# Patient Record
Sex: FEMALE | Race: BLACK OR AFRICAN AMERICAN | NOT HISPANIC OR LATINO | Employment: FULL TIME | URBAN - METROPOLITAN AREA
[De-identification: names, ages, dates, MRNs, and addresses within clinical notes are randomized per-mention and may not be internally consistent; named-entity substitution may affect disease eponyms.]

---

## 2017-01-09 ENCOUNTER — OFFICE VISIT (OUTPATIENT)
Dept: PEDIATRICS | Facility: CLINIC | Age: 42
End: 2017-01-09
Payer: COMMERCIAL

## 2017-01-09 VITALS
DIASTOLIC BLOOD PRESSURE: 62 MMHG | WEIGHT: 168.1 LBS | SYSTOLIC BLOOD PRESSURE: 98 MMHG | BODY MASS INDEX: 28.84 KG/M2 | OXYGEN SATURATION: 99 % | TEMPERATURE: 98.5 F | HEART RATE: 73 BPM

## 2017-01-09 DIAGNOSIS — G44.219 EPISODIC TENSION-TYPE HEADACHE, NOT INTRACTABLE: Primary | ICD-10-CM

## 2017-01-09 PROCEDURE — 99214 OFFICE O/P EST MOD 30 MIN: CPT | Performed by: INTERNAL MEDICINE

## 2017-01-09 RX ORDER — GABAPENTIN 100 MG/1
100 CAPSULE ORAL AT BEDTIME
Qty: 30 CAPSULE | Refills: 0 | Status: SHIPPED | OUTPATIENT
Start: 2017-01-09 | End: 2017-02-15

## 2017-01-09 NOTE — MR AVS SNAPSHOT
"              After Visit Summary   1/9/2017    Felicia Bermudez    MRN: [id number]           Patient Information     Date Of Birth          1975        Visit Information        Provider Department      1/9/2017 7:40 AM Jose Daniel Arredondo MD [facility]        Today's Diagnoses     Episodic tension-type headache, not intractable    -  1       Care Instructions    Stop the 300 mg gabapentin and begin 100 mg gabapentin.    Begin zyrtec daily 10 mg, and stop all flonase.    Call in 3 weeks with update; if grogginess and constipation not improving, we'll consider stopping gabapentin and beginning topamax.    Jose Daniel Arredondo MD  Internal Medicine and Pediatrics           Follow-ups after your visit        Your next 10 appointments already scheduled     Jan 13, 2017  8:20 AM   PHYSICAL with Reina Mason MD   [facility] ([facility])    [address] [place] [address]  [address]  [facility] [id number] [phone]              Who to contact     If you have questions or need follow up information about today's clinic visit or your schedule please contact [facility] directly at [phone].  Normal or non-critical lab and imaging results will be communicated to you by [employer], letter or phone within 4 business days after the clinic has received the results. If you do not hear from us within 7 days, please contact the clinic through [employer] or phone. If you have a critical or abnormal lab result, we will notify you by phone as soon as possible.  Submit refill requests through [employer] or call your pharmacy and they will forward the refill request to us. Please allow 3 business days for your refill to be completed.          Additional Information About Your Visit        [employer] Information     [employer] lets you send messages to your doctor, view your test results, renew your prescriptions, schedule appointments and more. To sign up, go to [email or web address] . Click on \"Log " "in\" on the left side of the screen, which will take you to the Welcome page. Then click on \"Sign up Now\" on the right side of the page.     You will be asked to enter the access code listed below, as well as some personal information. Please follow the directions to create your username and password.     Your access code is: [email or web address]  Expires: 3/1/2017 11:13 AM     Your access code will  in 90 days. If you need help or a new code, please call your [facility] clinic or [phone].        Care EveryWhere ID     This is your Care EveryWhere ID. This could be used by other organizations to access your [facility] medical records  [id number]        Your Vitals Were     Pulse Temperature Pulse Oximetry             73 98.5  F (36.9  C) (Oral) 99%          Blood Pressure from Last 3 Encounters:   17 98/62   16 98/60   16 102/66    Weight from Last 3 Encounters:   17 168 lb 1.6 oz (76.25 kg)   16 167 lb 2 oz (75.807 kg)   16 165 lb 4 oz (74.957 kg)              Today, you had the following     No orders found for display         Today's Medication Changes          These changes are accurate as of: 17  8:11 AM.  If you have any questions, ask your nurse or doctor.               These medicines have changed or have updated prescriptions.        Dose/Directions    gabapentin 100 MG capsule   Commonly known as:  NEURONTIN   This may have changed:    - medication strength  - how much to take   Used for:  Episodic tension-type headache, not intractable   Changed by:  Jose Daniel Arredondo MD        Dose:  100 mg   Take 1 capsule (100 mg) by mouth At Bedtime   Quantity:  30 capsule   Refills:  0         Stop taking these medicines if you haven't already. Please contact your care team if you have questions.     fluticasone 50 MCG/ACT spray   Commonly known as:  FLONASE   Stopped by:  Jose Daniel Arredondo MD                Where to get your medicines      These medications were sent to [facility] - [address]   [address], [place] [address]     Phone:  [phone]    - gabapentin 100 MG capsule             Primary Care Provider Office Phone # Fax #    Jose Daniel Arredondo [phone]       [facility] [address] [place] [address]        Thank you!     Thank you for choosing [facility]  for your care. Our goal is always to provide you with excellent care. Hearing back from our patients is one way we can continue to improve our services. Please take a few minutes to complete the written survey that you may receive in the mail after your visit with us. Thank you!             Your Updated Medication List - Protect others around you: Learn how to safely use, store and throw away your medicines at www.disposemymeds.org.          This list is accurate as of: 1/9/17  8:11 AM.  Always use your most recent med list.                   Brand Name Dispense Instructions for use    albuterol 108 (90 BASE) MCG/ACT Inhaler    PROAIR HFA/PROVENTIL HFA/VENTOLIN HFA    1 Inhaler    Inhale 2 puffs into the lungs every 6 hours as needed for shortness of breath / dyspnea or wheezing       BENADRYL PO          etonogestrel 68 MG Impl    IMPLANON/NEXPLANON    1 each    1 each (68 mg) by Subdermal route once for 1 dose       gabapentin 100 MG capsule    NEURONTIN    30 capsule    Take 1 capsule (100 mg) by mouth At Bedtime       hydrOXYzine 25 MG tablet    ATARAX    60 tablet    Take 1-2 tablets (25-50 mg) by mouth every 6 hours as needed for itching       IBUPROFEN PO          MULTIVITAMIN PO          pantoprazole 20 MG EC tablet    PROTONIX    30 tablet    Take by mouth 30-60 minutes before a meal.

## 2017-01-09 NOTE — PROGRESS NOTES
SUBJECTIVE:                                                    Felicia Bermudez is a 41 year old female who presents to clinic today for the following health issues:    Pt is here for a follow up on Headaches.  - states that they are still the same, not hurting as much as before, but still getting them  - getting them every other day  - still sometimes dizzy  - no more weakness or numbness   - still having the blind spots once in a while   - the Gabapentin has been helping with the pain, but having troubles with memory and making her tired, she did finish the rx   - thinks the headaches are from her neck and shoulder pains on both sides       Used to have headache about every day, but now (on gabapentin) only every 3 days, and are less intense.  Has headaches in the AM, and last until 4:00 PM.  Takes ibuprofen without much relief.      Feels tired as a side effect of gabapentin; sleeping better, but the next day feels groggy.    Also witih constipation, about once weekly.     Also, she has additional complaints of shoulder pain and neck pains; seeing chiropracter and massage; both shoulders.    Feels like is congested all the time; may be contributing; zyrtec has helped, but flonase has not.    Problem list and histories reviewed & adjusted, as indicated.  Additional history: as documented    Patient Active Problem List   Diagnosis     Insomnia     Urticaria     Multiple food allergies     PUD (peptic ulcer disease)     Abnormal Pap smear of cervix     High risk HPV infection     Past Surgical History   Procedure Laterality Date     Surgical history of -        2010 turbinate surgery       Social History   Substance Use Topics     Smoking status: Never Smoker      Smokeless tobacco: Never Used     Alcohol Use: No     Family History   Problem Relation Age of Onset     Neurologic Disorder Mother 57     brain anuerysm     Hypertension Father      Other Cancer Father 70     Testicle Cancer         Current Outpatient  Prescriptions   Medication Sig Dispense Refill     gabapentin (NEURONTIN) 100 MG capsule Take 1 capsule (100 mg) by mouth At Bedtime 30 capsule 0     hydrOXYzine (ATARAX) 25 MG tablet Take 1-2 tablets (25-50 mg) by mouth every 6 hours as needed for itching 60 tablet 1     pantoprazole (PROTONIX) 20 MG tablet Take by mouth 30-60 minutes before a meal. 30 tablet 0     etonogestrel (IMPLANON/NEXPLANON) 68 MG IMPL 1 each (68 mg) by Subdermal route once for 1 dose 1 each 0     Multiple Vitamins-Minerals (MULTIVITAMIN OR)        albuterol (PROAIR HFA, PROVENTIL HFA, VENTOLIN HFA) 108 (90 BASE) MCG/ACT inhaler Inhale 2 puffs into the lungs every 6 hours as needed for shortness of breath / dyspnea or wheezing 1 Inhaler 0     IBUPROFEN PO        DiphenhydrAMINE HCl (BENADRYL PO)        [DISCONTINUED] gabapentin (NEURONTIN) 300 MG capsule Take 1 capsule (300 mg) by mouth At Bedtime 30 capsule 1     Allergies   Allergen Reactions     Ambien      Tongue swelling     Amoxicillin      Tongue swelling     Augmentin      Tongue swelling     Avocado      Bactrim [Sulfamethoxazole W/Trimethoprim]      Tongue swelling     Cucumber Extract      Eszopiclone Swelling     Tongue swelling     Watermelon [Citrullus Vulgaris]      BP Readings from Last 3 Encounters:   01/09/17 98/62   12/01/16 98/60   08/26/16 102/66    Wt Readings from Last 3 Encounters:   01/09/17 168 lb 1.6 oz (76.25 kg)   12/01/16 167 lb 2 oz (75.807 kg)   08/26/16 165 lb 4 oz (74.957 kg)                  Labs reviewed in [facility]  Problem list, Medication list, Allergies, and Medical/Social/Surgical histories reviewed in [facility] and updated as appropriate.    ROS:  C: NEGATIVE for fever, chills, change in weight  E/M: NEGATIVE for ear, mouth and throat problems  R: NEGATIVE for significant cough or SOB  CV: NEGATIVE for chest pain, palpitations or peripheral edema    OBJECTIVE:                                                    BP 98/62 mmHg  Pulse 73  Temp(Src) 98.5  F  (36.9  C) (Oral)  Wt 168 lb 1.6 oz (76.25 kg)  SpO2 99%  Body mass index is 28.84 kg/(m^2).   GENERAL: healthy, alert, well nourished, well hydrated, no distress  HENT: ear canals- normal; TMs- normal; Nose- normal; Mouth- no ulcers, no lesions  NECK: no tenderness, no adenopathy, no asymmetry, no masses, no stiffness; thyroid- normal to palpation  RESP: lungs clear to auscultation - no rales, no rhonchi, no wheezes  CV: regular rates and rhythm, normal S1 S2, no S3 or S4 and no murmur, no click or rub -  ABDOMEN: soft, no tenderness, no  hepatosplenomegaly, no masses, normal bowel sounds    Diagnostic test results:  Diagnostic Test Results:  none      ASSESSMENT/PLAN:                                                    1. Episodic tension-type headache, not intractable  Overall, symptoms improved but now with some side effects of gabapentin.  Refilled a lower dose.  Consider topamax if not improved.  If THIS does not help, then would have evaluation by neurology.   Patient Instructions   Stop the 300 mg gabapentin and begin 100 mg gabapentin.    Begin zyrtec daily 10 mg, and stop all flonase.    Call in 3 weeks with update; if grogginess and constipation not improving, we'll consider stopping gabapentin and beginning topamax.    Jose Daniel Arredondo MD  Internal Medicine and Pediatrics          - gabapentin (NEURONTIN) 100 MG capsule; Take 1 capsule (100 mg) by mouth At Bedtime  Dispense: 30 capsule; Refill: 0      See Patient Instructions    Jose Daniel Arredondo MD  [facility]

## 2017-01-09 NOTE — NURSING NOTE
"Chief Complaint   Patient presents with     RECHECK       Initial BP 98/62 mmHg  Pulse 73  Temp(Src) 98.5  F (36.9  C) (Oral)  Wt 168 lb 1.6 oz (76.25 kg)  SpO2 99% Estimated body mass index is 28.84 kg/(m^2) as calculated from the following:    Height as of 8/26/16: 5' 4\" (1.626 m).    Weight as of this encounter: 168 lb 1.6 oz (76.25 kg).  BP completed using cuff size: clarence Kebede MA    "

## 2017-01-09 NOTE — PATIENT INSTRUCTIONS
Stop the 300 mg gabapentin and begin 100 mg gabapentin.    Begin zyrtec daily 10 mg, and stop all flonase.    Call in 3 weeks with update; if grogginess and constipation not improving, we'll consider stopping gabapentin and beginning topamax.    Jose Daniel Arredondo MD  Internal Medicine and Pediatrics

## 2017-01-13 ENCOUNTER — OFFICE VISIT (OUTPATIENT)
Dept: PEDIATRICS | Facility: CLINIC | Age: 42
End: 2017-01-13
Payer: COMMERCIAL

## 2017-01-13 VITALS
DIASTOLIC BLOOD PRESSURE: 66 MMHG | HEIGHT: 63 IN | SYSTOLIC BLOOD PRESSURE: 100 MMHG | HEART RATE: 85 BPM | TEMPERATURE: 98.6 F | WEIGHT: 167.3 LBS | OXYGEN SATURATION: 100 % | BODY MASS INDEX: 29.64 KG/M2

## 2017-01-13 DIAGNOSIS — N63.10 LUMP OF RIGHT BREAST: ICD-10-CM

## 2017-01-13 DIAGNOSIS — B97.7 HIGH RISK HPV INFECTION: ICD-10-CM

## 2017-01-13 DIAGNOSIS — Z01.411 ENCOUNTER FOR GYNECOLOGICAL EXAMINATION WITH ABNORMAL FINDING: Primary | ICD-10-CM

## 2017-01-13 DIAGNOSIS — O92.70 LACTATION PROBLEM: ICD-10-CM

## 2017-01-13 DIAGNOSIS — R63.5 ABNORMAL WEIGHT GAIN: ICD-10-CM

## 2017-01-13 DIAGNOSIS — R10.2 PELVIC PAIN IN FEMALE: ICD-10-CM

## 2017-01-13 LAB
BETA HCG QUAL IFA URINE: NEGATIVE
CORTIS SERPL-MCNC: 7.4 UG/DL (ref 4–22)
PROLACTIN SERPL-MCNC: 8 UG/L (ref 3–27)
TSH SERPL DL<=0.005 MIU/L-ACNC: 1.95 MU/L (ref 0.4–4)

## 2017-01-13 PROCEDURE — 99213 OFFICE O/P EST LOW 20 MIN: CPT | Mod: 25 | Performed by: PEDIATRICS

## 2017-01-13 PROCEDURE — 84146 ASSAY OF PROLACTIN: CPT | Performed by: PEDIATRICS

## 2017-01-13 PROCEDURE — 87624 HPV HI-RISK TYP POOLED RSLT: CPT | Performed by: PEDIATRICS

## 2017-01-13 PROCEDURE — 88175 CYTOPATH C/V AUTO FLUID REDO: CPT | Performed by: PEDIATRICS

## 2017-01-13 PROCEDURE — 99396 PREV VISIT EST AGE 40-64: CPT | Performed by: PEDIATRICS

## 2017-01-13 PROCEDURE — 36415 COLL VENOUS BLD VENIPUNCTURE: CPT | Performed by: PEDIATRICS

## 2017-01-13 PROCEDURE — 84703 CHORIONIC GONADOTROPIN ASSAY: CPT | Performed by: PEDIATRICS

## 2017-01-13 PROCEDURE — 84443 ASSAY THYROID STIM HORMONE: CPT | Performed by: PEDIATRICS

## 2017-01-13 PROCEDURE — 82533 TOTAL CORTISOL: CPT | Performed by: PEDIATRICS

## 2017-01-13 NOTE — MR AVS SNAPSHOT
After Visit Summary   1/13/2017    Felicia Bermudez    MRN: [id number]           Patient Information     Date Of Birth          1975        Visit Information        Provider Department      1/13/2017 8:20 AM Reina Mason MD [facility]        Today's Diagnoses     Encounter for gynecological examination with abnormal finding    -  1     Lump of right breast         Lactation problem         High risk HPV infection         Pelvic pain in female         Abnormal weight gain           Care Instructions    1. Pelvic ultrasound for right pelvic pain - you will be called to schedule this.  2. Mammogram and ultrasound - Please call [facility] to schedule mammogram: [phone]  3. Labs today  4. Endocrine referral for lactation and weight gain - ok to schedule while we are waiting for labs - referral below.      Preventive Health Recommendations  Female Ages 40 to 49    Yearly exam:     See your health care provider every year in order to  1. Review health changes.   2. Discuss preventive care.    3. Review your medicines if your doctor prescribed any.      Get a Pap test every three years (unless you have an abnormal result and your provider advises testing more often).      If you get Pap tests with HPV test, you only need to test every 5 years, unless you have an abnormal result. You do not need a Pap test if your uterus was removed (hysterectomy) and you have not had cancer.      You should be tested each year for STDs (sexually transmitted diseases), if you're at risk.       Ask your doctor if you should have a mammogram.      Have a colonoscopy (test for colon cancer) if someone in your family has had colon cancer or polyps before age 50.       Have a cholesterol test every 5 years.       Have a diabetes test (fasting glucose) after age 45. If you are at risk for diabetes, you should have this test every 3 years.    Shots: Get a flu shot each year. Get a tetanus shot every 10  years.     Nutrition:     Eat at least 5 servings of fruits and vegetables each day.    Eat whole-grain bread, whole-wheat pasta and brown rice instead of white grains and rice.    Talk to your provider about Calcium and Vitamin D.     Lifestyle    Exercise at least 150 minutes a week (an average of 30 minutes a day, 5 days a week). This will help you control your weight and prevent disease.    Limit alcohol to one drink per day.    No smoking.     Wear sunscreen to prevent skin cancer.    See your dentist every six months for an exam and cleaning.        Follow-ups after your visit        Additional Services     ENDOCRINOLOGY ADULT REFERRAL       Your provider has referred you to: [facility]: [place] [phone]   [email or web address]      Please be aware that coverage of these services is subject to the terms and limitations of your health insurance plan.  Call member services at your health plan with any benefit or coverage questions.      Please bring the following to your appointment:    >>   Any x-rays, CTs or MRIs which have been performed.  Contact the facility where they were done to arrange for  prior to your scheduled appointment.    >>   List of current medications   >>   This referral request   >>   Any documents/labs given to you for this referral                  Future tests that were ordered for you today     Open Future Orders        Priority Expected Expires Ordered    MA Diagnostic Digital Bilateral Routine  1/13/2018 1/13/2017    US Breast Bilateral Complete 4 Quadrants Routine  1/13/2018 1/13/2017    US Pelvic Complete with Transvaginal Routine  1/13/2018 1/13/2017            Who to contact     If you have questions or need follow up information about today's clinic visit or your schedule please contact [facility] directly at [phone].  Normal or non-critical lab and imaging results will be communicated to you by MyChart, letter or  "phone within 4 business days after the clinic has received the results. If you do not hear from us within 7 days, please contact the clinic through [employer] or phone. If you have a critical or abnormal lab result, we will notify you by phone as soon as possible.  Submit refill requests through [employer] or call your pharmacy and they will forward the refill request to us. Please allow 3 business days for your refill to be completed.          Additional Information About Your Visit        [employer] Information     [employer] lets you send messages to your doctor, view your test results, renew your prescriptions, schedule appointments and more. To sign up, go to [email or web address] . Click on \"Log in\" on the left side of the screen, which will take you to the Welcome page. Then click on \"Sign up Now\" on the right side of the page.     You will be asked to enter the access code listed below, as well as some personal information. Please follow the directions to create your username and password.     Your access code is: [email or web address]  Expires: 3/1/2017 11:13 AM     Your access code will  in 90 days. If you need help or a new code, please call your [facility] clinic or [phone].        Care EveryWhere ID     This is your Care EveryWhere ID. This could be used by other organizations to access your [facility] medical records  [id number]        Your Vitals Were     Pulse Temperature Height BMI (Body Mass Index) Pulse Oximetry       85 98.6  F (37  C) (Tympanic) 5' 2.5\" (1.588 m) 30.09 kg/m2 100%        Blood Pressure from Last 3 Encounters:   17 100/66   17 98/62   16 98/60    Weight from Last 3 Encounters:   17 167 lb 4.8 oz (75.887 kg)   17 168 lb 1.6 oz (76.25 kg)   16 167 lb 2 oz (75.807 kg)              We Performed the Following     Beta HCG qual IFA urine     Cortisol     ENDOCRINOLOGY ADULT REFERRAL     HPV High Risk Types DNA Cervical     Pap imaged thin layer diagnostic with " HPV (select HPV order below)     Prolactin     TSH with free T4 reflex        Primary Care Provider Office Phone # Fax #    Jose Daniel Arredondo [phone]       [facility] [address] [place] [address]        Thank you!     Thank you for choosing [facility]  for your care. Our goal is always to provide you with excellent care. Hearing back from our patients is one way we can continue to improve our services. Please take a few minutes to complete the written survey that you may receive in the mail after your visit with us. Thank you!             Your Updated Medication List - Protect others around you: Learn how to safely use, store and throw away your medicines at www.disposemymeds.org.          This list is accurate as of: 1/13/17  8:58 AM.  Always use your most recent med list.                   Brand Name Dispense Instructions for use    albuterol 108 (90 BASE) MCG/ACT Inhaler    PROAIR HFA/PROVENTIL HFA/VENTOLIN HFA    1 Inhaler    Inhale 2 puffs into the lungs every 6 hours as needed for shortness of breath / dyspnea or wheezing       BENADRYL PO          etonogestrel 68 MG Impl    IMPLANON/NEXPLANON    1 each    1 each (68 mg) by Subdermal route once for 1 dose       gabapentin 100 MG capsule    NEURONTIN    30 capsule    Take 1 capsule (100 mg) by mouth At Bedtime       hydrOXYzine 25 MG tablet    ATARAX    60 tablet    Take 1-2 tablets (25-50 mg) by mouth every 6 hours as needed for itching       IBUPROFEN PO          MULTIVITAMIN PO          pantoprazole 20 MG EC tablet    PROTONIX    30 tablet    Take by mouth 30-60 minutes before a meal.

## 2017-01-13 NOTE — Clinical Note
[facility]  [address]  [address]  [place] [address] [phone]      January 23, 2017    Felicia Bermudez  [address]   [place] [address]    Dear Felicia,  We are happy to inform you that your PAP smear result from 01/13/17 is normal.  We are now able to do a follow up test on PAP smears. The DNA test is for HPV (Human Papilloma Virus). Cervical cancer is closely linked with certain types of HPV. Your result showed no evidence of high risk HPV.  Therefore we recommend you return in 3 years for your next pap smear.  You will still need to return to the clinic every year for an annual exam and other preventive tests.  Please contact the clinic with any questions.  Sincerely,  Reina Mason MD/[facility]

## 2017-01-13 NOTE — PATIENT INSTRUCTIONS
1. Pelvic ultrasound for right pelvic pain - you will be called to schedule this.  2. Mammogram and ultrasound - [facility] call [facility] to schedule mammogram: [phone]  3. Labs today  4. Endocrine referral for lactation and weight gain - ok to schedule while we are waiting for labs - referral below.      Preventive Health Recommendations  Female Ages 40 to 49    Yearly exam:     See your health care provider every year in order to  1. Review health changes.   2. Discuss preventive care.    3. Review your medicines if your doctor prescribed any.      Get a Pap test every three years (unless you have an abnormal result and your provider advises testing more often).      If you get Pap tests with HPV test, you only need to test every 5 years, unless you have an abnormal result. You do not need a Pap test if your uterus was removed (hysterectomy) and you have not had cancer.      You should be tested each year for STDs (sexually transmitted diseases), if you're at risk.       Ask your doctor if you should have a mammogram.      Have a colonoscopy (test for colon cancer) if someone in your family has had colon cancer or polyps before age 50.       Have a cholesterol test every 5 years.       Have a diabetes test (fasting glucose) after age 45. If you are at risk for diabetes, you should have this test every 3 years.    Shots: Get a flu shot each year. Get a tetanus shot every 10 years.     Nutrition:     Eat at least 5 servings of fruits and vegetables each day.    Eat whole-grain bread, whole-wheat pasta and brown rice instead of white grains and rice.    Talk to your provider about Calcium and Vitamin D.     Lifestyle    Exercise at least 150 minutes a week (an average of 30 minutes a day, 5 days a week). This will help you control your weight and prevent disease.    Limit alcohol to one drink per day.    No smoking.     Wear sunscreen to prevent skin cancer.    See your dentist every six months for an exam and  cleaning.

## 2017-01-13 NOTE — NURSING NOTE
"Chief Complaint   Patient presents with     Physical       Initial /66 mmHg  Pulse 85  Temp(Src) 98.6  F (37  C) (Tympanic)  Ht 5' 2.5\" (1.588 m)  Wt 167 lb 4.8 oz (75.887 kg)  BMI 30.09 kg/m2  SpO2 100% Estimated body mass index is 30.09 kg/(m^2) as calculated from the following:    Height as of this encounter: 5' 2.5\" (1.588 m).    Weight as of this encounter: 167 lb 4.8 oz (75.887 kg).  BP completed using cuff size: regular    "

## 2017-01-13 NOTE — PROGRESS NOTES
SUBJECTIVE:     CC: Felicia Bermudez is an 41 year old woman who presents for preventive health visit.     Healthy Habits:    Do you get at least three servings of calcium containing foods daily (dairy, green leafy vegetables, etc.)? yes    Amount of exercise or daily activities, outside of work: 3 day(s) per week    Problems taking medications regularly No    Medication side effects: Yes gabapentin but dose was lowered     Have you had an eye exam in the past two years? yes    Do you see a dentist twice per year? yes  Do you have sleep apnea, excessive snoring or daytime drowsiness?no    PROBLEMS TO ADD ON...  - breast pain - patient continues to have constant breast pressure and daily lactation (last breast fed 18 years ago).  This was evaluated a year ago - labs normal.  For some reason patient never got to endocrine.  Symptoms continues - now with abnormal weight gain.  She tried a weight loss program, ultimate weight solution and worked with a doctor, but lost minimal weight - she states they were very frustrated with her because results were so poor.  They suggested she see an endocrinologist.    LMP 2 years ago (no periods or spotting with implanon)/.     - right side pelvic pain x 3 months, off and on.  Declines STD screen.  +dysparunia   Today's PHQ-2 Score:   PHQ-2 ( 1999 Pf[employer]) 1/13/2017 8/26/2016   Q1: Little interest or pleasure in doing things 0 0   Q2: Feeling down, depressed or hopeless 0 0   PHQ-2 Score 0 0       Abuse: Current or Past(Physical, Sexual or Emotional)- No  Do you feel safe in your environment - Yes    Social History   Substance Use Topics     Smoking status: Never Smoker      Smokeless tobacco: Never Used     Alcohol Use: No     The patient does not drink >3 drinks per day nor >7 drinks per week.    No results for input(s): CHOL, HDL, LDL, TRIG, CHOLHDLRATIO, NHDL in the last 04643 hours.    Reviewed orders with patient.  Reviewed health maintenance and updated orders accordingly -  "Yes    Mammo Decision Support:  Patient under age 50, mutual decision reflected in health maintenance.      Pertinent mammograms are reviewed under the imaging tab.  History of abnormal Pap smear: YES - updated in Problem List and Health Maintenance accordingly  All Histories reviewed and updated in Epic.    ROS:  C: NEGATIVE for fever, chills, change in weight  I: NEGATIVE for worrisome rashes, moles or lesions  E: NEGATIVE for vision changes or irritation  ENT: NEGATIVE for ear, mouth and throat problems  R: NEGATIVE for significant cough or SOB  B:hpi  CV: NEGATIVE for chest pain, palpitations or peripheral edema  GI: NEGATIVE for nausea, abdominal pain, heartburn, or change in bowel habits  : hpi  M: NEGATIVE for significant arthralgias or myalgia  N: NEGATIVE for weakness, dizziness or paresthesias  P: NEGATIVE for changes in mood or affect    Problem list, Medication list, Allergies, and Medical/Social/Surgical histories reviewed in [facility] and updated as appropriate.  OBJECTIVE:     /66 mmHg  Pulse 85  Temp(Src) 98.6  F (37  C) (Tympanic)  Ht 5' 2.5\" (1.588 m)  Wt 167 lb 4.8 oz (75.887 kg)  BMI 30.09 kg/m2  SpO2 100%  EXAM:  GENERAL: healthy, alert and no distress  EYES: Eyes grossly normal to inspection, PERRL and conjunctivae and sclerae normal  HENT: ear canals and TM's normal, nose and mouth without ulcers or lesions  NECK: no adenopathy, no asymmetry, masses, or scars and thyroid normal to palpation  RESP: lungs clear to auscultation - no rales, rhonchi or wheezes  BREAST: left normal without masses, right breast with grape sized lump at 6 oclock, bilateral tenderness--no nipple discharge on exam  CV: regular rate and rhythm, normal S1 S2, no S3 or S4, no murmur, click or rub, no peripheral edema and peripheral pulses strong  ABDOMEN: soft, nontender, no hepatosplenomegaly, no masses and bowel sounds normal   (female): normal female external genitalia, normal urethral meatus, vaginal mucosa " "pink, moist, well rugated, and normal cervix, +right cul du sac tenderness  MS: no gross musculoskeletal defects noted, no edema  SKIN: no suspicious lesions or rashes  NEURO: Normal strength and tone, mentation intact and speech normal  PSYCH: mentation appears normal, affect normal/bright    ASSESSMENT/PLAN:     1. Encounter for gynecological examination with abnormal finding  - Pap imaged thin layer diagnostic with HPV (select HPV order below)  - HPV High Risk Types DNA Cervical    2. Lump of right breast  - MA Diagnostic Digital Bilateral; Future  - US Breast Bilateral Complete 4 Quadrants; Future    3. Lactation problem  Needs to see endocrine given continued lactation and unusual weight gain.  - TSH with free T4 reflex  - Prolactin  - Cortisol  - MA Diagnostic Digital Bilateral; Future  - US Breast Bilateral Complete 4 Quadrants; Future  - Beta HCG qual IFA urine  - ENDOCRINOLOGY ADULT REFERRAL  - OFFICE/OUTPT VISIT,EST,LEVL III    4. High risk HPV infection  - Pap imaged thin layer diagnostic with HPV (select HPV order below)  - HPV High Risk Types DNA Cervical    5. Pelvic pain in female  Exam concerning with tenderness - need to visualize ovaries  - US Pelvic Complete with Transvaginal; Future  - OFFICE/OUTPT VISIT,EST,LEVL III    6. Abnormal weight gain  - ENDOCRINOLOGY ADULT REFERRAL  - OFFICE/OUTPT VISIT,EST,LEVL III    COUNSELING:   Reviewed preventive health counseling, as reflected in patient instructions       Regular exercise       Healthy diet/nutrition       reports that she has never smoked. She has never used smokeless tobacco.    Estimated body mass index is 30.09 kg/(m^2) as calculated from the following:    Height as of this encounter: 5' 2.5\" (1.588 m).    Weight as of this encounter: 167 lb 4.8 oz (75.887 kg).   Weight management plan: Discussed healthy diet and exercise guidelines and patient will follow up in 12 months in clinic to re-evaluate.    Counseling Resources:  ATP IV " Guidelines  Pooled Cohorts Equation Calculator  Breast Cancer Risk Calculator  FRAX Risk Assessment  ICSI Preventive Guidelines  Dietary Guidelines for Americans, 2010  [place]'s MyPlate  ASA Prophylaxis  Lung CA Screening    Reina Mason MD  [facility]

## 2017-01-17 LAB
COPATH REPORT: NORMAL
PAP: NORMAL

## 2017-01-18 ENCOUNTER — HOSPITAL ENCOUNTER (OUTPATIENT)
Dept: ULTRASOUND IMAGING | Facility: CLINIC | Age: 42
End: 2017-01-18
Attending: PEDIATRICS
Payer: COMMERCIAL

## 2017-01-18 ENCOUNTER — HOSPITAL ENCOUNTER (OUTPATIENT)
Dept: MAMMOGRAPHY | Facility: CLINIC | Age: 42
End: 2017-01-18
Attending: PEDIATRICS
Payer: COMMERCIAL

## 2017-01-18 ENCOUNTER — HOSPITAL ENCOUNTER (OUTPATIENT)
Dept: ULTRASOUND IMAGING | Facility: CLINIC | Age: 42
Discharge: HOME | End: 2017-01-18
Attending: PEDIATRICS | Admitting: PEDIATRICS
Payer: COMMERCIAL

## 2017-01-18 DIAGNOSIS — O92.70 LACTATION PROBLEM: ICD-10-CM

## 2017-01-18 DIAGNOSIS — N63.10 LUMP OF RIGHT BREAST: ICD-10-CM

## 2017-01-18 DIAGNOSIS — R10.2 PELVIC PAIN IN FEMALE: ICD-10-CM

## 2017-01-18 PROCEDURE — 76642 ULTRASOUND BREAST LIMITED: CPT | Mod: 50

## 2017-01-18 PROCEDURE — G0204 DX MAMMO INCL CAD BI: HCPCS

## 2017-01-18 PROCEDURE — 76830 TRANSVAGINAL US NON-OB: CPT

## 2017-01-20 LAB
FINAL DIAGNOSIS: NORMAL
HPV HR 12 DNA CVX QL NAA+PROBE: NEGATIVE
HPV16 DNA SPEC QL NAA+PROBE: NEGATIVE
HPV18 DNA SPEC QL NAA+PROBE: NEGATIVE
SPECIMEN DESCRIPTION: NORMAL

## 2017-02-13 ENCOUNTER — OFFICE VISIT (OUTPATIENT)
Dept: URGENT CARE | Facility: URGENT CARE | Age: 42
End: 2017-02-13
Payer: COMMERCIAL

## 2017-02-13 VITALS
SYSTOLIC BLOOD PRESSURE: 102 MMHG | TEMPERATURE: 98.8 F | OXYGEN SATURATION: 99 % | HEART RATE: 73 BPM | WEIGHT: 171.4 LBS | BODY MASS INDEX: 30.85 KG/M2 | DIASTOLIC BLOOD PRESSURE: 64 MMHG

## 2017-02-13 DIAGNOSIS — J01.90 ACUTE SINUSITIS WITH SYMPTOMS > 10 DAYS: Primary | ICD-10-CM

## 2017-02-13 PROCEDURE — 99213 OFFICE O/P EST LOW 20 MIN: CPT | Performed by: PHYSICIAN ASSISTANT

## 2017-02-13 RX ORDER — AZITHROMYCIN 250 MG/1
TABLET, FILM COATED ORAL
Qty: 6 TABLET | Refills: 0 | Status: SHIPPED | OUTPATIENT
Start: 2017-02-13 | End: 2017-02-28

## 2017-02-13 NOTE — NURSING NOTE
"Chief Complaint   Patient presents with     Urgent Care     Nasal Congestion     Pt states has had congestion, headaches, and sinus pressure in face x 3 weeks. States has used nasal spray, Mucinex, and Sudafed for symptoms.        Initial /64 (BP Location: Right arm, Patient Position: Chair, Cuff Size: Adult Large)  Pulse 73  Temp 98.8  F (37.1  C) (Tympanic)  Wt 171 lb 6.4 oz (77.7 kg)  SpO2 99%  BMI 30.85 kg/m2 Estimated body mass index is 30.85 kg/(m^2) as calculated from the following:    Height as of 1/13/17: 5' 2.5\" (1.588 m).    Weight as of this encounter: 171 lb 6.4 oz (77.7 kg).  Medication Reconciliation: unable or not appropriate to perform   Padmini Mercer CMA (AAMA) 2/13/2017 3:23 PM    "

## 2017-02-13 NOTE — MR AVS SNAPSHOT
"              After Visit Summary   2/13/2017    Felicia Bermudez    MRN: [id number]           Patient Information     Date Of Birth          1975        Visit Information        Provider Department      2/13/2017 2:45 PM Brittany Ramos PA-C [facility]        Today's Diagnoses     Acute sinusitis with symptoms > 10 days    -  1       Follow-ups after your visit        Future tests that were ordered for you today     Open Future Orders        Priority Expected Expires Ordered    Cortisol free urine Routine  3/1/2018 2/28/2017            Who to contact     If you have questions or need follow up information about today's clinic visit or your schedule please contact [facility] directly at [phone].  Normal or non-critical lab and imaging results will be communicated to you by [employer], letter or phone within 4 business days after the clinic has received the results. If you do not hear from us within 7 days, please contact the clinic through [employer] or phone. If you have a critical or abnormal lab result, we will notify you by phone as soon as possible.  Submit refill requests through [employer] or call your pharmacy and they will forward the refill request to us. Please allow 3 business days for your refill to be completed.          Additional Information About Your Visit        MyChart Information     [employer] lets you send messages to your doctor, view your test results, renew your prescriptions, schedule appointments and more. To sign up, go to [email or web address] . Click on \"Log in\" on the left side of the screen, which will take you to the Welcome page. Then click on \"Sign up Now\" on the right side of the page.     You will be asked to enter the access code listed below, as well as some personal information. Please follow the directions to create your username and password.     Your access code is: [email or web address]  Expires: 3/1/2017 11:13 AM     Your access code will "  in 90 days. If you need help or a new code, please call your [place] clinic or [phone].        Care EveryWhere ID     This is your Care EveryWhere ID. This could be used by other organizations to access your [place] medical records  [id number]        Your Vitals Were     Pulse Temperature Pulse Oximetry BMI (Body Mass Index)          73 98.8  F (37.1  C) (Tympanic) 99% 30.85 kg/m2         Blood Pressure from Last 3 Encounters:   17 118/62   17 102/64   17 100/66    Weight from Last 3 Encounters:   17 171 lb (77.6 kg)   17 171 lb 6.4 oz (77.7 kg)   17 167 lb 4.8 oz (75.9 kg)              Today, you had the following     No orders found for display         Today's Medication Changes          These changes are accurate as of: 17 11:59 PM.  If you have any questions, ask your nurse or doctor.               Start taking these medicines.        Dose/Directions    azithromycin 250 MG tablet   Commonly known as:  ZITHROMAX   Used for:  Acute sinusitis with symptoms > 10 days   Started by:  Brittany Ramos PA-C        Two tablets first day, then one tablet daily for four days.   Quantity:  6 tablet   Refills:  0            Where to get your medicines      These medications were sent to [facility] [address]  [address], [place] [address]     Phone:  [phone]     azithromycin 250 MG tablet                Primary Care Provider Office Phone # Fax #    Jose Daniel Arredondo [phone]       [facility] [address] [place] [address]        Thank you!     Thank you for choosing [facility]  for your care. Our goal is always to provide you with excellent care. Hearing back from our patients is one way we can continue to improve our services. Please take a few minutes to complete the written survey that you may receive in the mail after your visit  with us. Thank you!             Your Updated Medication List - Protect others around you: Learn how to safely use, store and throw away your medicines at www.disposemymeds.org.          This list is accurate as of: 2/13/17 11:59 PM.  Always use your most recent med list.                   Brand Name Dispense Instructions for use    albuterol 108 (90 BASE) MCG/ACT Inhaler    PROAIR HFA/PROVENTIL HFA/VENTOLIN HFA    1 Inhaler    Inhale 2 puffs into the lungs every 6 hours as needed for shortness of breath / dyspnea or wheezing       azithromycin 250 MG tablet    ZITHROMAX    6 tablet    Two tablets first day, then one tablet daily for four days.       BENADRYL PO          etonogestrel 68 MG Impl    IMPLANON/NEXPLANON    1 each    1 each (68 mg) by Subdermal route once for 1 dose       gabapentin 100 MG capsule    NEURONTIN    30 capsule    Take 1 capsule (100 mg) by mouth At Bedtime       hydrOXYzine 25 MG tablet    ATARAX    60 tablet    Take 1-2 tablets (25-50 mg) by mouth every 6 hours as needed for itching       IBUPROFEN PO          MULTIVITAMIN PO          pantoprazole 20 MG EC tablet    PROTONIX    30 tablet    Take by mouth 30-60 minutes before a meal.

## 2017-02-15 ENCOUNTER — TELEPHONE (OUTPATIENT)
Dept: PEDIATRICS | Facility: CLINIC | Age: 42
End: 2017-02-15

## 2017-02-15 DIAGNOSIS — G43.719 INTRACTABLE CHRONIC MIGRAINE WITHOUT AURA AND WITHOUT STATUS MIGRAINOSUS: Primary | ICD-10-CM

## 2017-02-15 RX ORDER — TOPIRAMATE 25 MG/1
TABLET, FILM COATED ORAL
Qty: 70 TABLET | Refills: 1 | Status: SHIPPED
Start: 2017-02-15 | End: 2017-07-11

## 2017-02-15 NOTE — TELEPHONE ENCOUNTER
Gabapentin not working for shoulder pain and HAs.  Pt was to report in after 3wks of trying this med.  1-9-17 OV note:  Call in 3 weeks with update; if grogginess and constipation not improving, we'll consider stopping gabapentin and beginning topamax.     Jose Daniel Arredondo MD  Internal Medicine and Pediatrics   Janet Badillo RN

## 2017-02-15 NOTE — TELEPHONE ENCOUNTER
OK.    Please tell her to stop the gabapentin 100 mg and to begin topamax.      Needs to see me within 2 months or so.    Call for any significant new side effects; dizziness, fatiuge.    Jose Daniel Arredondo MD  Internal Medicine and Pediatrics

## 2017-02-21 ENCOUNTER — TELEPHONE (OUTPATIENT)
Dept: PEDIATRICS | Facility: CLINIC | Age: 42
End: 2017-02-21

## 2017-02-21 DIAGNOSIS — G43.719 INTRACTABLE CHRONIC MIGRAINE WITHOUT AURA AND WITHOUT STATUS MIGRAINOSUS: Primary | ICD-10-CM

## 2017-02-21 NOTE — TELEPHONE ENCOUNTER
Pt. Calls and states she had picked up her prescription for Topamax for migraines, she had read about the side effects and worried about ithem so she would like to see an ENT specialist first before starting. Pt. Thinks her migraines are r/t sinus pressure.     She states she finished Z martin and feels slightly better but still feels like she has a head cold and with nasal congestion .     If you think otherwise please let her know.  ENT referral needed? Please advise.    Ingrid Jones, RN, BSN, PHN

## 2017-02-28 ENCOUNTER — OFFICE VISIT (OUTPATIENT)
Dept: ENDOCRINOLOGY | Facility: CLINIC | Age: 42
End: 2017-02-28
Payer: COMMERCIAL

## 2017-02-28 VITALS
DIASTOLIC BLOOD PRESSURE: 62 MMHG | WEIGHT: 171 LBS | BODY MASS INDEX: 30.3 KG/M2 | HEART RATE: 78 BPM | HEIGHT: 63 IN | SYSTOLIC BLOOD PRESSURE: 118 MMHG | RESPIRATION RATE: 12 BRPM

## 2017-02-28 DIAGNOSIS — R63.5 ABNORMAL WEIGHT GAIN: Primary | ICD-10-CM

## 2017-02-28 DIAGNOSIS — G47.00 INSOMNIA, UNSPECIFIED TYPE: ICD-10-CM

## 2017-02-28 PROCEDURE — 99244 OFF/OP CNSLTJ NEW/EST MOD 40: CPT | Performed by: INTERNAL MEDICINE

## 2017-02-28 NOTE — NURSING NOTE
"Chief Complaint   Patient presents with     Consult For     Sleep / weight issues       Initial /62 (BP Location: Right arm, Cuff Size: Adult Regular)  Pulse 78  Resp 12  Ht 1.588 m (5' 2.5\")  Wt 77.6 kg (171 lb)  BMI 30.78 kg/m2 Estimated body mass index is 30.78 kg/(m^2) as calculated from the following:    Height as of this encounter: 1.588 m (5' 2.5\").    Weight as of this encounter: 77.6 kg (171 lb).  Medication Reconciliation: zoya Villatoro CMA    2/28/2017  10:36 AM       "

## 2017-02-28 NOTE — PATIENT INSTRUCTIONS
[facility] & [place] locations   Dr Malcolm, Endocrinology Department      [facility]   [address] [address]  Appointment [phone]  Fax: [phone]   Monday and Tuesday         [facility]   [address] [place] [address]  Appointment [phone]  Fax: [phone]  Wednesday and Thursday           Need 24 hour urine collection.    24 Hour Urine Collection    - During a 24-hour urine collection, follow your usual diet and drink fluids as you ordinarily would.  - Avoid drinking alcohol before and during the urine collection.    - For a 24-hour urine collection, all of the urine that you pass over a 24-hour time period must be collected. You will receive a special container to collect the sample.    The following are directions for collecting a 24-hour urine sample while at home:    In the morning scheduled to begin the urine collection, urinate in the toilet and flush away the first urine you pass. Write down the date and time. That is the start date and time for the collection.    Collect all urine you pass, day and night, for 24 hours. Use the container given to you to collect the urine. Avoid using other containers. The urine sample must include the last urine that you pass 24 hours after starting the collection. Do not allow toilet paper, stool, or anything else to be added to the urine sample.    Write down the date and time that the last sample is collected.

## 2017-02-28 NOTE — MR AVS SNAPSHOT
After Visit Summary   2017    Felicia Bermudez    MRN: [id number]           Patient Information     Date Of Birth          1975        Visit Information        Provider Department      2017 10:30 AM Elisa Malcolm MD [facility]        [facility]   Dr Malcolm, Endocrinology [facility]      [facility]   [address] [address]  Appointment [phone]  Fax: [phone]   Monday and Tuesday         [place] [address] [facility] [place] [address]  Appointment [phone]  Fax: [phone]  Wednesday and Thursday           Need 24 hour urine collection.    24 Hour Urine Collection    - During a 24-hour urine collection, follow your usual diet and drink fluids as you ordinarily would.  - Avoid drinking alcohol before and during the urine collection.    - For a 24-hour urine collection, all of the urine that you pass over a 24-hour time period must be collected. You will receive a special container to collect the sample.    The following are directions for collecting a 24-hour urine sample while at home:    In the morning scheduled to begin the urine collection, urinate in the toilet and flush away the first urine you pass. Write down the date and time. That is the start date and time for the collection.    Collect all urine you pass, day and night, for 24 hours. Use the container given to you to collect the urine. Avoid using other containers. The urine sample must include the last urine that you pass 24 hours after starting the collection. Do not allow toilet paper, stool, or anything else to be added to the urine sample.    Write down the date and time that the last sample is collected.          Follow-ups after your visit        Who to contact     If you have questions or need follow up information about today's  "clinic visit or your schedule please contact [facility] directly at [phone].  Normal or non-critical lab and imaging results will be communicated to you by MyChart, letter or phone within 4 business days after the clinic has received the results. If you do not hear from us within 7 days, please contact the clinic through [employer] or phone. If you have a critical or abnormal lab result, we will notify you by phone as soon as possible.  Submit refill requests through [employer] or call your pharmacy and they will forward the refill request to us. Please allow 3 business days for your refill to be completed.          Additional Information About Your Visit        MyChart Information     [employer] lets you send messages to your doctor, view your test results, renew your prescriptions, schedule appointments and more. To sign up, go to [email or web address] . Click on \"Log in\" on the left side of the screen, which will take you to the Welcome page. Then click on \"Sign up Now\" on the right side of the page.     You will be asked to enter the access code listed below, as well as some personal information. Please follow the directions to create your username and password.     Your access code is: [email or web address]  Expires: 3/1/2017 11:13 AM     Your access code will  in 90 days. If you need help or a new code, please call your [place] clinic or [phone].        Care EveryWhere ID     This is your Care EveryWhere ID. This could be used by other organizations to access your [place] medical records  [id number]        Your Vitals Were     Pulse Respirations Height BMI (Body Mass Index)          78 12 1.588 m (5' 2.5\") 30.78 kg/m2         Blood Pressure from Last 3 Encounters:   17 118/62   17 102/64   17 100/66    Weight from Last 3 Encounters:   17 77.6 kg (171 lb)   17 77.7 kg (171 lb 6.4 oz)   17 75.9 kg (167 lb 4.8 oz)              Today, you had the following     No " orders found for display       Primary Care Provider Office Phone # Fax #    Jose Daniel Arredondo [phone]       [address] [place] [address]        Thank you!     Thank you for choosing [facility]  for your care. Our goal is always to provide you with excellent care. Hearing back from our patients is one way we can continue to improve our services. Please take a few minutes to complete the written survey that you may receive in the mail after your visit with us. Thank you!             Your Updated Medication List - Protect others around you: Learn how to safely use, store and throw away your medicines at www.disposemymeds.org.          This list is accurate as of: 2/28/17 11:03 AM.  Always use your most recent med list.                   Brand Name Dispense Instructions for use    albuterol 108 (90 BASE) MCG/ACT Inhaler    PROAIR HFA/PROVENTIL HFA/VENTOLIN HFA    1 Inhaler    Inhale 2 puffs into the lungs every 6 hours as needed for shortness of breath / dyspnea or wheezing       BENADRYL PO          etonogestrel 68 MG Impl    IMPLANON/NEXPLANON    1 each    1 each (68 mg) by Subdermal route once for 1 dose       hydrOXYzine 25 MG tablet    ATARAX    60 tablet    Take 1-2 tablets (25-50 mg) by mouth every 6 hours as needed for itching       IBUPROFEN PO          MULTIVITAMIN PO          pantoprazole 20 MG EC tablet    PROTONIX    30 tablet    Take by mouth 30-60 minutes before a meal.       topiramate 25 MG tablet    TOPAMAX    70 tablet    Take 1 tablet (25 mg) at bedtime for 1 week, then 1 tablet twice daily for 1 week, then 1 tablet in AM and 2 in PM for 1 week, then 2 tablets twice daily.

## 2017-02-28 NOTE — PROGRESS NOTES
Name: Felicia Bermudez  Seen at the request of Jose Daniel Arredondo for   Chief Complaint   Patient presents with     Consult For     Sleep / weight issues      HPI:  Felicia Bermudez is a 41 year old female who presents for the evaluation of    Sleep problem and difficult to loose weight.  She reports that since coming to [place] in 1995 she is not able to sleep.  She keeps thinking about different things in her mind and wakes up a few times during the night.  Had sleep study done and by her report there was no diagnosis of sleep apnea.  She feels tired throughout the day.      Weight gain:  Gained 20 lbs X 4-5 years.  + exercise X 4-5 times a week. 1-1.5 hr. Does kee and cardio + weights.  Eating healthy  Went into diet challenge- only lost 5 lbs when friends lost more. Was for 7 months.  History of headaches and was recently started on Topamax but she has not started taking it.  Mother, father and siblings are normal weight.  She denies constipation, diarrhea, chest pain, palpitations.  No difficulty with swallowing or pain during swallowing.  Has 2 kids. Youngest is 18 years old.  2014-152  December 2015-162  May 2016-172 December 2016-167 February 2017- 171    PMH/PSH:  Past Medical History   Diagnosis Date     High risk HPV infection 08/03/15     NIL, +HPV 18, plan colp     History of colposcopy 11/16/15     No bx taken     Hives      multiple allergies - food, environmental     Insomnia      Past Surgical History   Procedure Laterality Date     Surgical history of -        2010 turbinate surgery     Family Hx:  Family History   Problem Relation Age of Onset     Neurologic Disorder Mother 57     brain anuerysm     Hypertension Father      Other Cancer Father 70     Testicle Cancer     Thyroid disease: No           Social Hx:  Social History     Social History     Marital status: Single     Spouse name: N/A     Number of children: N/A     Years of education: N/A     Occupational History     Not on file.  "    Social History Main Topics     Smoking status: Never Smoker     Smokeless tobacco: Never Used     Alcohol use No     Drug use: No     Sexual activity: Yes     Other Topics Concern     Parent/Sibling W/ Cabg, Mi Or Angioplasty Before 65f 55m? No     Social History Narrative          MEDICATIONS:  has a current medication list which includes the following prescription(s): hydroxyzine, pantoprazole, etonogestrel, multiple vitamins-minerals, albuterol, ibuprofen, diphenhydramine hcl, and topiramate.    ROS     ROS: 10 point ROS neg other than the symptoms noted above in the HPI.    Physical Exam   VS: /62 (BP Location: Right arm, Cuff Size: Adult Regular)  Pulse 78  Resp 12  Ht 1.588 m (5' 2.5\")  Wt 77.6 kg (171 lb)  BMI 30.78 kg/m2  GENERAL: AXOX3, NAD, well dressed, answering questions appropriately, appears stated age.  HEENT: OP clear, no LAD, no TM, non-tender, no exopthalmous, no proptosis, EOMI, no lig lag, no retraction  NECK: Thyroid normal in size, non tender, no nodules were palpated.  CV: RRR, no rubs, gallops, no murmurs  LUNGS: CTAB, no wheezes, rales, or ronchi  ABDOMEN: +BS. Stretch marks + , not hyperpigmented.  EXTREMITIES: no edema, +pulses, no rashes, no lesions  NEUROLOGY: CN grossly intact, + DTR upper and lower extremity, no tremors  MSK: grossly intact  SKIN: no rashes, no lesions  PSYCH: normal affect and mood    LABS:  Last Basic Metabolic Panel:  Lab Results   Component Value Date     12/01/2016      Lab Results   Component Value Date    POTASSIUM 4.1 12/01/2016     Lab Results   Component Value Date    CHLORIDE 104 12/01/2016     Lab Results   Component Value Date    AYUSH 8.7 12/01/2016     Lab Results   Component Value Date    CO2 28 12/01/2016     Lab Results   Component Value Date    BUN 7 12/01/2016     Lab Results   Component Value Date    CR 0.63 12/01/2016     Lab Results   Component Value Date    GLC 80 12/01/2016       TSH   Date Value Ref Range Status   01/13/2017 " 1.95 0.40 - 4.00 mU/L Final   ]      All pertinent notes, labs, and images personally reviewed by me.     A/P  Ms.Fatuma LOIS Bermudez is a 41 year old here for the evaluation of:  1.  Weight gain:  Body mass index is 30.78 kg/(m^2).  I discussed that weight gain can be multifactorial.  She has anxiety and stress issues which can be contributing factor.  Thyroid function test are in the normal range.  We'll check adrenal function with 24-hour urine cortisol collection.  I discussed at FDA approved medications for medical weight loss.  She does not have any obesity related complications.  She has history of headaches and was started on Topamax.  This should also help with weight loss.  I offered dietitian referral but she reports that she is eating healthy.  The patient is advised to Make better food choices: reduce carbs, Reduce portion size, weight loss and exercise 3-4 times a week.    2.  Sleep problems:  Likely related to anxiety.  Encouraged to f/u with PCP    More than 50% of face to face time spent with Ms. Bermudez on counseling / coordinating her care.  Total appointment times was 45 minutes.      All questions were answered.  The patient indicates understanding of the above issues and agrees with the plan set forth.       Follow-up:  Based on labs    Elisa Malcolm MD  Endocrinology   [facility]    CC: Jose Daniel Arredondo     Disclaimer: This note consists of symbols derived from keyboarding, dictation and/or voice recognition software. As a result, there may be errors in the script that have gone undetected. Please consider this when interpreting information found in this chart.    Addendum to above note and clinic visit:    Labs reviewed.    See result note/telephone encounter.

## 2017-03-01 NOTE — PROGRESS NOTES
SUBJECTIVE:  Felicia Bermudez is a 41 year old female here with concerns about sinus infection.  She states onset of symptoms were 3 week(s) ago.  She has had maxillary pressure. Course of illness is worsening. Severity moderate  Current and Associated symptoms: nasal congestion and facial pain/pressure  Predisposing factors include none. Recent treatment has included: Antihistamine, Decongestants and OTC meds    Past Medical History   Diagnosis Date     Abnormal weight gain 2/28/2017     High risk HPV infection 08/03/15     NIL, +HPV 18, plan colp     History of colposcopy 11/16/15     No bx taken     Hives      multiple allergies - food, environmental     Insomnia      Social History   Substance Use Topics     Smoking status: Never Smoker     Smokeless tobacco: Never Used     Alcohol use No       ROS:  Review of systems negative except as stated above.    OBJECTIVE:  /64 (BP Location: Right arm, Patient Position: Chair, Cuff Size: Adult Large)  Pulse 73  Temp 98.8  F (37.1  C) (Tympanic)  Wt 171 lb 6.4 oz (77.7 kg)  SpO2 99%  BMI 30.85 kg/m2  Exam:GENERAL APPEARANCE: healthy, alert and no distress  EYES: EOMI,  PERRL, conjunctiva clear  HENT: TM's normal bilaterally, nasal turbinates erythematous, swollen, rhinorrhea yellow and thick, oral mucous membranes moist, no erythema noted and maxillary sinus tenderness   NECK: supple, nontender, no lymphadenopathy  RESP: lungs clear to auscultation - no rales, rhonchi or wheezes  CV: regular rates and rhythm, normal S1 S2, no murmur noted  NEURO: Normal strength and tone, sensory exam grossly normal,  normal speech and mentation  SKIN: no suspicious lesions or rashes    assessment/plan:  (J01.90) Acute sinusitis with symptoms > 10 days  (primary encounter diagnosis)  Comment: =  Plan: : azithromycin (ZITHROMAX) 250 MG         tablet        OTC med for sx relief and hot packs to face, steam and increased fluids.  Med as directed.

## 2017-03-06 DIAGNOSIS — L50.9 URTICARIA, UNSPECIFIED: ICD-10-CM

## 2017-03-06 RX ORDER — HYDROXYZINE HYDROCHLORIDE 25 MG/1
25-50 TABLET, FILM COATED ORAL EVERY 6 HOURS PRN
Qty: 60 TABLET | Refills: 1 | Status: SHIPPED | OUTPATIENT
Start: 2017-03-06 | End: 2018-01-16

## 2017-03-06 NOTE — TELEPHONE ENCOUNTER
Prescription approved per [facility] [place] Refill Protocol.  Caitlyn Lundy [facility]   [phone]

## 2017-03-06 NOTE — TELEPHONE ENCOUNTER
Hydroxyzine 25mg        Last Written Prescription Date: 1/18/15  Last Fill Quantity: 60,  # refills: 1   Last Office Visit with [facility], P or [facility] prescribing provider: 02/28/17    Shane Dobson Pharmacy Technician  [facility]

## 2017-03-07 PROCEDURE — 99000 SPECIMEN HANDLING OFFICE-LAB: CPT | Performed by: INTERNAL MEDICINE

## 2017-03-07 PROCEDURE — 82530 CORTISOL FREE: CPT | Mod: 90 | Performed by: INTERNAL MEDICINE

## 2017-03-08 DIAGNOSIS — R63.5 ABNORMAL WEIGHT GAIN: ICD-10-CM

## 2017-03-08 NOTE — LETTER
March 13, 2017      Felicia Bermudez  [address] [address] [address]              Dear Felicia Bermudez,    Labs done with endocrinology showed normal 24 hour urine cortisol levels.  This is reassuring.    Follow up as discussed in last clinic visit.    Please call endocrinology clinic (nurse line: [phone]) if questions.      Thank you.    Elisa Malcolm       Results for orders placed or performed in visit on 03/08/17   Cortisol free urine   Result Value Ref Range    Cortisol Free Duration Urine 24 HOURS h    Volume 850 ML mL    Cortisol ug/g creatinine 25.83     Cortisol Free Urine Random 15.50     Cortisol Free Urine 13.2     Creat/Vol 60     Creat/24hr 510 (L)     Cortisol Free Urine Intrepretation       SEE NOTE  (Note)  INTERPRETIVE INFORMATION: Cortisol Urine Free by LC-MS/MS  The optimal specimen for this testing is a 24-hour urine  collection. Mass per day calculations are not reported for  the following specimen types: a random collection, a  collection with duration of less than 20 hours, a  collection with duration of greater than 28 hours, or a  collection with total volume less than 400 mL or greater  than 5000 mL. Ratios to creatinine may be useful for these  evaluations.    Baseline urinary free cortisol excretion less than 5 ug/d  may be consistent with adrenal insufficiency.  Access complete set of age- and/or gender-specific  reference intervals for this test in the [facility]  Test Directory ([employer]).  Test developed and characteristics determined by [facility]. See Compliance Statement B: [employer]/CS  Performed by [facility],  [address] [place] [id number] [phone]  [email or web address], Mark Hough MD, Lab. Director            Your [facility] Care Team

## 2017-03-13 LAB
COLLECT DURATION TIME SPEC: ABNORMAL H
CORTIS F 24H UR HPLC-MCNC: 15.5 UG/ML
CORTIS F 24H UR-MRATE: 13.2
CORTIS F/CREAT 24H UR: 25.83
CREAT 24H UR-MCNC: 60 MG/DL
CREAT 24H UR-MRATE: 510 G/(24.H)
IMP & REVIEW OF LAB RESULTS: ABNORMAL
SPECIMEN VOL ?TM UR: ABNORMAL ML

## 2017-03-14 ENCOUNTER — TRANSFERRED RECORDS (OUTPATIENT)
Dept: HEALTH INFORMATION MANAGEMENT | Facility: CLINIC | Age: 42
End: 2017-03-14

## 2017-03-28 ENCOUNTER — TRANSFERRED RECORDS (OUTPATIENT)
Dept: HEALTH INFORMATION MANAGEMENT | Facility: CLINIC | Age: 42
End: 2017-03-28

## 2017-04-05 ENCOUNTER — TELEPHONE (OUTPATIENT)
Dept: PEDIATRICS | Facility: CLINIC | Age: 42
End: 2017-04-05

## 2017-04-05 NOTE — TELEPHONE ENCOUNTER
Patient states that she didn't start the Topamax right away but once she started she is now 3 weeks without a migraine. She is so thankful. States she is having some problems sleeping since starting however and is requesting either suggestions or a prescription. She is aware that Dr. Arredondo not in this week.   [phone]  Sheridan Barreto RN

## 2017-04-13 ENCOUNTER — OFFICE VISIT (OUTPATIENT)
Dept: PEDIATRICS | Facility: CLINIC | Age: 42
End: 2017-04-13
Payer: COMMERCIAL

## 2017-04-13 VITALS
WEIGHT: 170.1 LBS | HEIGHT: 63 IN | TEMPERATURE: 98.1 F | HEART RATE: 71 BPM | BODY MASS INDEX: 30.14 KG/M2 | RESPIRATION RATE: 16 BRPM | OXYGEN SATURATION: 99 % | DIASTOLIC BLOOD PRESSURE: 78 MMHG | SYSTOLIC BLOOD PRESSURE: 120 MMHG

## 2017-04-13 DIAGNOSIS — G47.00 INSOMNIA, UNSPECIFIED TYPE: Primary | ICD-10-CM

## 2017-04-13 DIAGNOSIS — G43.719 INTRACTABLE CHRONIC MIGRAINE WITHOUT AURA AND WITHOUT STATUS MIGRAINOSUS: ICD-10-CM

## 2017-04-13 DIAGNOSIS — K27.9 PUD (PEPTIC ULCER DISEASE): ICD-10-CM

## 2017-04-13 LAB
ERYTHROCYTE [DISTWIDTH] IN BLOOD BY AUTOMATED COUNT: 14.4 % (ref 10–15)
HCT VFR BLD AUTO: 40.7 % (ref 35–47)
HGB BLD-MCNC: 13.7 G/DL (ref 11.7–15.7)
MCH RBC QN AUTO: 28 PG (ref 26.5–33)
MCHC RBC AUTO-ENTMCNC: 33.7 G/DL (ref 31.5–36.5)
MCV RBC AUTO: 83 FL (ref 78–100)
PLATELET # BLD AUTO: 159 10E9/L (ref 150–450)
RBC # BLD AUTO: 4.89 10E12/L (ref 3.8–5.2)
WBC # BLD AUTO: 9.4 10E9/L (ref 4–11)

## 2017-04-13 PROCEDURE — 85027 COMPLETE CBC AUTOMATED: CPT | Performed by: INTERNAL MEDICINE

## 2017-04-13 PROCEDURE — 36415 COLL VENOUS BLD VENIPUNCTURE: CPT | Performed by: INTERNAL MEDICINE

## 2017-04-13 PROCEDURE — 80053 COMPREHEN METABOLIC PANEL: CPT | Performed by: INTERNAL MEDICINE

## 2017-04-13 PROCEDURE — 99214 OFFICE O/P EST MOD 30 MIN: CPT | Performed by: INTERNAL MEDICINE

## 2017-04-13 RX ORDER — ZALEPLON 10 MG/1
10 CAPSULE ORAL AT BEDTIME
Qty: 30 CAPSULE | Refills: 0 | Status: SHIPPED | OUTPATIENT
Start: 2017-04-13 | End: 2017-04-20

## 2017-04-13 RX ORDER — TOPIRAMATE 50 MG/1
50 TABLET, FILM COATED ORAL 2 TIMES DAILY
Qty: 180 TABLET | Refills: 3 | Status: SHIPPED | OUTPATIENT
Start: 2017-04-13 | End: 2017-07-11

## 2017-04-13 NOTE — MR AVS SNAPSHOT
"              After Visit Summary   4/13/2017    Felicia Bermudez    MRN: [id number]           Patient Information     Date Of Birth          1975        Visit Information        Provider Department      4/13/2017 4:00 PM Jose Daniel Arredondo MD [facility]        Today's Diagnoses     Insomnia, unspecified type    -  1    Intractable chronic migraine without aura and without status migrainosus          Care Instructions    Try prilosec (omeprazole) 20 mg over-the-counter Daily, 30 min before breakfast.     Lab work downstairs today.  Directions:  As you walk through the first floor, you'll see (on the right) first the pharmacy, then some bathrooms, then the \"Lab and Imaging\" area. Give them your name at the window there and wait for them to call you.    Trial of sonata, nightly before bed.  Call if you'd like a refill; watch for side effects like sleep walking.    Follow up in 6 months.    Jose Daniel Arredondo MD  Internal Medicine and Pediatrics           Follow-ups after your visit        Who to contact     If you have questions or need follow up information about today's clinic visit or your schedule please contact [facility] directly at [phone].  Normal or non-critical lab and imaging results will be communicated to you by MyChart, letter or phone within 4 business days after the clinic has received the results. If you do not hear from us within 7 days, please contact the clinic through MyChart or phone. If you have a critical or abnormal lab result, we will notify you by phone as soon as possible.  Submit refill requests through [employer] or call your pharmacy and they will forward the refill request to us. Please allow 3 business days for your refill to be completed.          Additional Information About Your Visit        MyChart Information     [employer] lets you send messages to your doctor, view your test results, renew your prescriptions, schedule appointments and more. To sign up, go to " "[email or web address] . Click on \"Log in\" on the left side of the screen, which will take you to the Welcome page. Then click on \"Sign up Now\" on the right side of the page.     You will be asked to enter the access code listed below, as well as some personal information. Please follow the directions to create your username and password.     Your access code is: [email or web address]  Expires: 2017  4:00 PM     Your access code will  in 90 days. If you need help or a new code, please call your [place] clinic or [phone].        Care EveryWhere ID     This is your Care EveryWhere ID. This could be used by other organizations to access your [place] medical records  [id number]        Your Vitals Were     Pulse Temperature Respirations Height Pulse Oximetry BMI (Body Mass Index)    71 98.1  F (36.7  C) (Oral) 16 5' 2.5\" (1.588 m) 99% 30.62 kg/m2       Blood Pressure from Last 3 Encounters:   17 120/78   17 118/62   17 102/64    Weight from Last 3 Encounters:   17 170 lb 1.6 oz (77.2 kg)   17 171 lb (77.6 kg)   17 171 lb 6.4 oz (77.7 kg)              We Performed the Following     CBC with platelets     Comprehensive metabolic panel (BMP + Alb, Alk Phos, ALT, AST, Total. Bili, TP)          Today's Medication Changes          These changes are accurate as of: 17  4:00 PM.  If you have any questions, ask your nurse or doctor.               Start taking these medicines.        Dose/Directions    zaleplon 10 MG capsule   Commonly known as:  SONATA   Used for:  Insomnia, unspecified type   Started by:  Jose Daniel Arredondo MD        Dose:  10 mg   Take 1 capsule (10 mg) by mouth At Bedtime   Quantity:  30 capsule   Refills:  0         These medicines have changed or have updated prescriptions.        Dose/Directions    * topiramate 25 MG tablet   Commonly known as:  TOPAMAX   This may have changed:  Another medication with the same name was added. Make sure you understand how and " when to take each.   Used for:  Intractable chronic migraine without aura and without status migrainosus   Changed by:  Jose Daniel Arredondo MD        Take 1 tablet (25 mg) at bedtime for 1 week, then 1 tablet twice daily for 1 week, then 1 tablet in AM and 2 in PM for 1 week, then 2 tablets twice daily.   Quantity:  70 tablet   Refills:  1       * topiramate 50 MG tablet   Commonly known as:  TOPAMAX   This may have changed:  You were already taking a medication with the same name, and this prescription was added. Make sure you understand how and when to take each.   Used for:  Intractable chronic migraine without aura and without status migrainosus   Changed by:  Jose Daniel Arredondo MD        Dose:  50 mg   Take 1 tablet (50 mg) by mouth 2 times daily   Quantity:  180 tablet   Refills:  3       * Notice:  This list has 2 medication(s) that are the same as other medications prescribed for you. Read the directions carefully, and ask your doctor or other care provider to review them with you.         Where to get your medicines      These medications were sent to [facility] [address]   [address] [place] [address]  [address] [address]     Phone:  [phone]     topiramate 50 MG tablet         Some of these will need a paper prescription and others can be bought over the counter.  Ask your nurse if you have questions.     Bring a paper prescription for each of these medications     zaleplon 10 MG capsule                Primary Care Provider Office Phone # Fax #    Jose Daniel Arredondo [phone]       [address] [place] [address]        Thank you!     Thank you for choosing [facility]  for your care. Our goal is always to provide you with excellent care. Hearing back from our patients is one way we can continue to improve our services. Please take a few minutes to complete the written survey that you may receive in  the mail after your visit with us. Thank you!             Your Updated Medication List - Protect others around you: Learn how to safely use, store and throw away your medicines at www.disposemymeds.org.          This list is accurate as of: 4/13/17  4:00 PM.  Always use your most recent med list.                   Brand Name Dispense Instructions for use    albuterol 108 (90 BASE) MCG/ACT Inhaler    PROAIR HFA/PROVENTIL HFA/VENTOLIN HFA    1 Inhaler    Inhale 2 puffs into the lungs every 6 hours as needed for shortness of breath / dyspnea or wheezing       BENADRYL PO          etonogestrel 68 MG Impl    IMPLANON/NEXPLANON    1 each    1 each (68 mg) by Subdermal route once for 1 dose       hydrOXYzine 25 MG tablet    ATARAX    60 tablet    Take 1-2 tablets (25-50 mg) by mouth every 6 hours as needed for itching       IBUPROFEN PO          MULTIVITAMIN PO          pantoprazole 20 MG EC tablet    PROTONIX    30 tablet    Take by mouth 30-60 minutes before a meal.       * topiramate 25 MG tablet    TOPAMAX    70 tablet    Take 1 tablet (25 mg) at bedtime for 1 week, then 1 tablet twice daily for 1 week, then 1 tablet in AM and 2 in PM for 1 week, then 2 tablets twice daily.       * topiramate 50 MG tablet    TOPAMAX    180 tablet    Take 1 tablet (50 mg) by mouth 2 times daily       zaleplon 10 MG capsule    SONATA    30 capsule    Take 1 capsule (10 mg) by mouth At Bedtime       * Notice:  This list has 2 medication(s) that are the same as other medications prescribed for you. Read the directions carefully, and ask your doctor or other care provider to review them with you.

## 2017-04-13 NOTE — PROGRESS NOTES
"  SUBJECTIVE:                                                    Felicia Bermudez is a 41 year old female who presents to clinic today for the following health issues:    Insomnia      Duration: 2 weeks     Description  Frequency of insomnia:  nightly  Time to fall asleep: Unsure, is very difficult for pt to fall asleep.   Middle of night awakening:  nightly  Early morning awakening:  Wakes up early everyday for work.     Accompanying signs and symptoms:  Fatigued and achy     History  Similar episodes in past:  YES- has insomnia   Previous evaluation/sleep study:  YES    Precipitating or alleviating factors:  New stressful situation: no   Caffeine intake after lunchtime: no   OTC decongestants: no   Any new medications: YES- Topamax     Therapies tried and outcome: OTC sleep aids        Topamax working very well.  No further headache!  Feels like stomach \"ulcer is acting up\"  No blood in stool or black stool.  No further mental \"fogginess\".  Can now open eyes in sunlight without photosensitivity.      Still with sleep issues; was told to join a \"study group.\"  Multiple meds tried.   Ambien and lunesta caused sleep walking.  Tried melatonin.. Tried mirtazepine and restoril, elavil.      Goes to bed at 11, wakes at 6:00.  Sleeps only 4 of these 7 hours; wakes up a lot in between.      Takes hydroxyzine only for hives.     Problem list and histories reviewed & adjusted, as indicated.  Additional history: as documented    Patient Active Problem List   Diagnosis     Urticaria     Multiple food allergies     PUD (peptic ulcer disease)     Abnormal Pap smear of cervix     High risk HPV infection     Abnormal weight gain     Insomnia, unspecified type     Past Surgical History:   Procedure Laterality Date     SURGICAL HISTORY OF -       2010 turbinate surgery       Social History   Substance Use Topics     Smoking status: Never Smoker     Smokeless tobacco: Never Used     Alcohol use No     Family History   Problem Relation " Age of Onset     Neurologic Disorder Mother 57     brain anuerysm     Hypertension Father      Other Cancer Father 70     Testicle Cancer         Current Outpatient Prescriptions   Medication Sig Dispense Refill     topiramate (TOPAMAX) 50 MG tablet Take 1 tablet (50 mg) by mouth 2 times daily 180 tablet 3     zaleplon (SONATA) 10 MG capsule Take 1 capsule (10 mg) by mouth At Bedtime 30 capsule 0     hydrOXYzine (ATARAX) 25 MG tablet Take 1-2 tablets (25-50 mg) by mouth every 6 hours as needed for itching 60 tablet 1     topiramate (TOPAMAX) 25 MG tablet Take 1 tablet (25 mg) at bedtime for 1 week, then 1 tablet twice daily for 1 week, then 1 tablet in AM and 2 in PM for 1 week, then 2 tablets twice daily. 70 tablet 1     pantoprazole (PROTONIX) 20 MG tablet Take by mouth 30-60 minutes before a meal. 30 tablet 0     Multiple Vitamins-Minerals (MULTIVITAMIN OR)        albuterol (PROAIR HFA, PROVENTIL HFA, VENTOLIN HFA) 108 (90 BASE) MCG/ACT inhaler Inhale 2 puffs into the lungs every 6 hours as needed for shortness of breath / dyspnea or wheezing 1 Inhaler 0     IBUPROFEN PO        DiphenhydrAMINE HCl (BENADRYL PO)        etonogestrel (IMPLANON/NEXPLANON) 68 MG IMPL 1 each (68 mg) by Subdermal route once for 1 dose 1 each 0     Allergies   Allergen Reactions     Ambien      Tongue swelling     Amoxicillin      Tongue swelling     Augmentin      Tongue swelling     Avocado      Bactrim [Sulfamethoxazole W/Trimethoprim]      Tongue swelling     Cucumber Extract      Eszopiclone Swelling     Tongue swelling     Watermelon [Citrullus Vulgaris]      BP Readings from Last 3 Encounters:   04/13/17 120/78   02/28/17 118/62   02/13/17 102/64    Wt Readings from Last 3 Encounters:   04/13/17 170 lb 1.6 oz (77.2 kg)   02/28/17 171 lb (77.6 kg)   02/13/17 171 lb 6.4 oz (77.7 kg)                  Labs reviewed in EPIC    Reviewed and updated as needed this visit by clinical staff  Tobacco  Allergies  Meds  Med Hx  Surg Hx   "Fam Hx  Soc Hx      Reviewed and updated as needed this visit by Provider         ROS:  C: NEGATIVE for fever, chills, change in weight  E/M: NEGATIVE for ear, mouth and throat problems  R: NEGATIVE for significant cough or SOB  CV: NEGATIVE for chest pain, palpitations or peripheral edema    OBJECTIVE:                                                    /78 (BP Location: Right arm, Patient Position: Chair, Cuff Size: Adult Large)  Pulse 71  Temp 98.1  F (36.7  C) (Oral)  Resp 16  Ht 5' 2.5\" (1.588 m)  Wt 170 lb 1.6 oz (77.2 kg)  SpO2 99%  BMI 30.62 kg/m2  Body mass index is 30.62 kg/(m^2).   GENERAL: healthy, alert, well nourished, well hydrated, no distress  HENT: ear canals- normal; TMs- normal; Nose- normal; Mouth- no ulcers, no lesions  NECK: no tenderness, no adenopathy, no asymmetry, no masses, no stiffness; thyroid- normal to palpation  RESP: lungs clear to auscultation - no rales, no rhonchi, no wheezes  CV: regular rates and rhythm, normal S1 S2, no S3 or S4 and no murmur, no click or rub -  ABDOMEN: soft, no tenderness, no  hepatosplenomegaly, no masses, normal bowel sounds    Diagnostic test results:  Diagnostic Test Results:  none      ASSESSMENT/PLAN:                                                    1. Intractable chronic migraine without aura and without status migrainosus  Patient says that \"her life is transformed\" due to no further headache.  Is very happy, tearful today.  Continue topamax 50 mg twice daily.  Follow up In 6 months.  Labs today for medication monitoring.   - topiramate (TOPAMAX) 50 MG tablet; Take 1 tablet (50 mg) by mouth 2 times daily  Dispense: 180 tablet; Refill: 3  - Comprehensive metabolic panel (BMP + Alb, Alk Phos, ALT, AST, Total. Bili, TP)  - CBC with platelets    2. Insomnia, unspecified type  Multiple meds without effect.  Has seen sleep counselor at [facility].  Begin trial of sonata today.  Follow up as needed>   - zaleplon (SONATA) 10 MG capsule; Take 1 " "capsule (10 mg) by mouth At Bedtime  Dispense: 30 capsule; Refill: 0    3.  gastroesophageal reflux disease:  Trial of protonix and prilosec (omeprazole).  Avoid alcohol, caffeine, tobacco, spicy foods, citrus foods, aspirin and anti-inflammatories like ibuprofen (\"Advil\" and \"Motrin\") or naproxen (\"Aleve\").       See Patient Instructions    Jose Daniel Arredondo MD  [facility]  "

## 2017-04-13 NOTE — LETTER
April 14, 2017      Felicia ABREU Aidan  [address] [address] [address]        Dear Ms. Felicia Bermudez,    Enclosed is a copy of your recent lab results. Your complete blood count and electrolytes, liver, and kidney panels look within normal limits.     If you have any further questions or concerns, please feel free to call us at [phone].    Hope you're well.    Sincerely,     Jose Daniel Arredondo M.D./[place]

## 2017-04-13 NOTE — PATIENT INSTRUCTIONS
"Try prilosec (omeprazole) 20 mg over-the-counter Daily, 30 min before breakfast.     Lab work downstairs today.  Directions:  As you walk through the first floor, you'll see (on the right) first the pharmacy, then some bathrooms, then the \"Lab and Imaging\" area. Give them your name at the window there and wait for them to call you.    Trial of sonata, nightly before bed.  Call if you'd like a refill; watch for side effects like sleep walking.    Follow up in 6 months.    Jose Daniel Arredondo MD  Internal Medicine and Pediatrics     "

## 2017-04-13 NOTE — NURSING NOTE
"Chief Complaint   Patient presents with     Sleep Problem       Initial /78 (BP Location: Right arm, Patient Position: Chair, Cuff Size: Adult Large)  Pulse 71  Temp 98.1  F (36.7  C) (Oral)  Resp 16  Ht 5' 2.5\" (1.588 m)  Wt 170 lb 1.6 oz (77.2 kg)  SpO2 99%  BMI 30.62 kg/m2 Estimated body mass index is 30.62 kg/(m^2) as calculated from the following:    Height as of this encounter: 5' 2.5\" (1.588 m).    Weight as of this encounter: 170 lb 1.6 oz (77.2 kg).  Medication Reconciliation: complete   Sabrina Sevilla MA      "

## 2017-04-14 LAB
ALBUMIN SERPL-MCNC: 3.8 G/DL (ref 3.4–5)
ALP SERPL-CCNC: 68 U/L (ref 40–150)
ALT SERPL W P-5'-P-CCNC: 17 U/L (ref 0–50)
ANION GAP SERPL CALCULATED.3IONS-SCNC: 11 MMOL/L (ref 3–14)
AST SERPL W P-5'-P-CCNC: 12 U/L (ref 0–45)
BILIRUB SERPL-MCNC: 0.2 MG/DL (ref 0.2–1.3)
BUN SERPL-MCNC: 9 MG/DL (ref 7–30)
CALCIUM SERPL-MCNC: 8.7 MG/DL (ref 8.5–10.1)
CHLORIDE SERPL-SCNC: 110 MMOL/L (ref 94–109)
CO2 SERPL-SCNC: 21 MMOL/L (ref 20–32)
CREAT SERPL-MCNC: 0.66 MG/DL (ref 0.52–1.04)
GFR SERPL CREATININE-BSD FRML MDRD: ABNORMAL ML/MIN/1.7M2
GLUCOSE SERPL-MCNC: 88 MG/DL (ref 70–99)
POTASSIUM SERPL-SCNC: 4 MMOL/L (ref 3.4–5.3)
PROT SERPL-MCNC: 7.2 G/DL (ref 6.8–8.8)
SODIUM SERPL-SCNC: 142 MMOL/L (ref 133–144)

## 2017-04-17 ENCOUNTER — TELEPHONE (OUTPATIENT)
Dept: PEDIATRICS | Facility: CLINIC | Age: 42
End: 2017-04-17

## 2017-04-17 DIAGNOSIS — G47.00 INSOMNIA, UNSPECIFIED TYPE: Primary | ICD-10-CM

## 2017-04-17 DIAGNOSIS — K21.9 GASTROESOPHAGEAL REFLUX DISEASE, ESOPHAGITIS PRESENCE NOT SPECIFIED: ICD-10-CM

## 2017-04-17 PROBLEM — G43.719 INTRACTABLE CHRONIC MIGRAINE WITHOUT AURA AND WITHOUT STATUS MIGRAINOSUS: Status: ACTIVE | Noted: 2017-04-17

## 2017-04-17 RX ORDER — PANTOPRAZOLE SODIUM 20 MG/1
TABLET, DELAYED RELEASE ORAL
Qty: 30 TABLET | Refills: 5 | Status: SHIPPED | OUTPATIENT
Start: 2017-04-17 | End: 2018-01-16

## 2017-04-17 NOTE — TELEPHONE ENCOUNTER
PA required on: Zaleplon 10mg  Patient Insurance is: [employer]  Insurance phone number is: [phone]  ID #: [id number]  BIN :[id number]  PCN :[id number]  GROUP :[id number]    PA required for >15 capsules per 25 days. Please let us know when PA is granted or denied. Thank you.    Isabel Leon  [place]: [place]

## 2017-04-17 NOTE — TELEPHONE ENCOUNTER
Pantoprazole 20mg  Last Written Prescription Date: 11/18/2015  Last Fill Quantity: 30,  # refills: 0   Last Office Visit with [facility], [facility] or [facility] prescribing provider: 4/13/17

## 2017-04-17 NOTE — TELEPHONE ENCOUNTER
PA completed on cover my meds. Awaiting response.   Contact plan to follow up on [id number]    Julianna Root

## 2017-04-20 NOTE — TELEPHONE ENCOUNTER
Called pt and informed, she asked if there was any alternatives that she could try. Alternative medications given by insurance are Zolpidem, Temazepam, or OTC Doxylamine.     If okay, please send in alternative medication to pharmacy, thanks.   Sabrina Sevilla MA

## 2017-04-20 NOTE — TELEPHONE ENCOUNTER
OK.    Please let patient that she can pay out of pocket if she wants.  Otherwise, we can't do much else.    Jose Daniel Arredondo MD  Internal Medicine and Pediatrics

## 2017-04-20 NOTE — TELEPHONE ENCOUNTER
We can try doxylamine; faxed to our pharmacy.    Others have not been effective    Jose Daniel Arredondo MD  Internal Medicine and Pediatrics

## 2017-04-20 NOTE — TELEPHONE ENCOUNTER
Prior auth for Zaleplon denied by [employer].    Please inform provider.      Thank you    [facility]

## 2017-07-05 ENCOUNTER — TELEPHONE (OUTPATIENT)
Dept: PEDIATRICS | Facility: CLINIC | Age: 42
End: 2017-07-05

## 2017-07-05 NOTE — TELEPHONE ENCOUNTER
Contract patient for safety, should have follow-up appointment in clinic to address.    Could see one of the residents tomorrow PM who is working with Dr. Arredondo.    Aidan Faust MD

## 2017-07-05 NOTE — TELEPHONE ENCOUNTER
Pt calling back, made an appointment with resident provider tomorrow pm.    Cindy, RN  Triage Nurse

## 2017-07-05 NOTE — TELEPHONE ENCOUNTER
Pt notifies the following:     - pt has been taking topamax 50 mg bid for migraine HA  - started with 25 mg since Feb, no SE were noticed  - increased dose to 50 mg bid about 2 months ago, since then has been experiencing extreme fatigue, no motivation to do anything, feeling sad & crying spells  - med did help her HA, but her SE are getting worse lately  - denies suicidal thoughts, insomnia, vision trouble, plans to hurt herself/others, hives, trouble breathing  - all she wants to do is laying in bed & do nothing  - updated phq while on phone  - appetite has been normal  - LOV was on 04/13/17    Please advise. Pt is aware that pcp is not in office today & says that this can wait till tomorrow for him to address. Pt can be reached at [phone](OK to LM).    PHQ-9 ([employer]) 7/5/2017   PHQ-9 Total Score 17     Cindy, RN  Triage Nurse

## 2017-07-05 NOTE — TELEPHONE ENCOUNTER
Called pt at [phone] & LM to call us back. Will await for pt's call back.    Cindy, RN  Triage Nurse

## 2017-07-06 ASSESSMENT — PATIENT HEALTH QUESTIONNAIRE - PHQ9: SUM OF ALL RESPONSES TO PHQ QUESTIONS 1-9: 17

## 2017-07-07 ENCOUNTER — OFFICE VISIT (OUTPATIENT)
Dept: FAMILY MEDICINE | Facility: CLINIC | Age: 42
End: 2017-07-07
Payer: COMMERCIAL

## 2017-07-07 VITALS
WEIGHT: 166 LBS | RESPIRATION RATE: 16 BRPM | HEART RATE: 65 BPM | OXYGEN SATURATION: 100 % | BODY MASS INDEX: 29.88 KG/M2 | TEMPERATURE: 99.4 F | SYSTOLIC BLOOD PRESSURE: 112 MMHG | DIASTOLIC BLOOD PRESSURE: 68 MMHG

## 2017-07-07 DIAGNOSIS — N92.0 EXCESSIVE OR FREQUENT MENSTRUATION: Primary | ICD-10-CM

## 2017-07-07 PROCEDURE — 99213 OFFICE O/P EST LOW 20 MIN: CPT | Performed by: PHYSICIAN ASSISTANT

## 2017-07-07 NOTE — PROGRESS NOTES
SUBJECTIVE:                                                    Felicia Bermudez is a 41 year old female who presents to clinic today for the following health issues:      Vaginal Bleeding (Dysmenorrhea)      Onset: x3 weeks ago    Description:  Duration of bleeding episodes: 1st episode was 2.5 weeks  Frequency between periods:  Stopped for 4 days and started again  Describe bleeding/flow:   Clots: YES  Number of pads/hour: 2  Cramping: severe    Intensity:  severe    Accompanying signs and symptoms: None    History (similar episodes/previous evaluation): Mirena and Nexplanon for heavy periods    Precipitating or alleviating factors: None    Therapies tried and outcome: None    Patient is here today to discuss heavy bleeding   Ongoing for 3 weeks  She states that she used to be on other options of birth control but those didn't work so she ended up getting Nexplanon  Is now on her 2nd Nexplanon as it has worked well for heavy period in the past  However all of a sudden, getting break thru bleeding  2 pads/hour  Would like non-hormonal version of getting the bleeding to stop      Problem list and histories reviewed & adjusted, as indicated.  Additional history: as documented    Patient Active Problem List   Diagnosis     Urticaria     Multiple food allergies     PUD (peptic ulcer disease)     Abnormal Pap smear of cervix     High risk HPV infection     Abnormal weight gain     Insomnia, unspecified type     Intractable chronic migraine without aura and without status migrainosus     Past Surgical History:   Procedure Laterality Date     SURGICAL HISTORY OF -       2010 turbinate surgery       Social History   Substance Use Topics     Smoking status: Never Smoker     Smokeless tobacco: Never Used     Alcohol use No     Family History   Problem Relation Age of Onset     Neurologic Disorder Mother 57     brain anuerysm     Hypertension Father      Other Cancer Father 70     Testicle Cancer         Current Outpatient  Prescriptions   Medication Sig Dispense Refill     pantoprazole (PROTONIX) 20 MG EC tablet Take by mouth 30-60 minutes before a meal. 30 tablet 5     topiramate (TOPAMAX) 50 MG tablet Take 1 tablet (50 mg) by mouth 2 times daily 180 tablet 3     hydrOXYzine (ATARAX) 25 MG tablet Take 1-2 tablets (25-50 mg) by mouth every 6 hours as needed for itching 60 tablet 1     etonogestrel (IMPLANON/NEXPLANON) 68 MG IMPL 1 each (68 mg) by Subdermal route once for 1 dose 1 each 0     Multiple Vitamins-Minerals (MULTIVITAMIN OR)        albuterol (PROAIR HFA, PROVENTIL HFA, VENTOLIN HFA) 108 (90 BASE) MCG/ACT inhaler Inhale 2 puffs into the lungs every 6 hours as needed for shortness of breath / dyspnea or wheezing 1 Inhaler 0     IBUPROFEN PO        DiphenhydrAMINE HCl (BENADRYL PO)        doxylamine (UNISOM) 25 MG TABS tablet Take 1 tablet (25 mg) by mouth At Bedtime (Patient not taking: Reported on 7/7/2017) 14 each 0     topiramate (TOPAMAX) 25 MG tablet Take 1 tablet (25 mg) at bedtime for 1 week, then 1 tablet twice daily for 1 week, then 1 tablet in AM and 2 in PM for 1 week, then 2 tablets twice daily. (Patient not taking: Reported on 7/7/2017) 70 tablet 1     Allergies   Allergen Reactions     Ambien      Tongue swelling     Amoxicillin      Tongue swelling     Augmentin      Tongue swelling     Avocado      Bactrim [Sulfamethoxazole W/Trimethoprim]      Tongue swelling     Cucumber Extract      Eszopiclone Swelling     Tongue swelling     Watermelon [Citrullus Vulgaris]        Reviewed and updated as needed this visit by clinical staff       Reviewed and updated as needed this visit by Provider         ROS:  Constitutional, HEENT, cardiovascular, pulmonary, gi and gu systems are negative, except as otherwise noted.    OBJECTIVE:     /68 (BP Location: Right arm, Patient Position: Chair, Cuff Size: Adult Large)  Pulse 65  Temp 99.4  F (37.4  C) (Oral)  Resp 16  Wt 166 lb (75.3 kg)  SpO2 100%  Breastfeeding? No   BMI 29.88 kg/m2  Body mass index is 29.88 kg/(m^2).  GENERAL: healthy, alert and no distress  NECK: no adenopathy, no asymmetry, masses, or scars and thyroid normal to palpation  RESP: lungs clear to auscultation - no rales, rhonchi or wheezes  CV: regular rate and rhythm, normal S1 S2, no S3 or S4, no murmur, click or rub, no peripheral edema and peripheral pulses strong  MS: no gross musculoskeletal defects noted, no edema    Diagnostic Test Results:  none     ASSESSMENT/PLAN:             1. Excessive or frequent menstruation  Chronic issue, despite having Nexplanon in place.  Offered to give birth control pills to help stop the bleeding, patient declined. Patient would like to discuss options for non-hormonal versions of stopping the bleeding. Discussed may benefit from ablation or hysterectomy. Will refer to OB/GYN to further discuss.  - OB/GYN REFERRAL    Risks, benefits and alternatives were discussed with patient. Agreeable to the plan of care.      Renate Brantley PA-C  [facility]

## 2017-07-07 NOTE — NURSING NOTE
"Chief Complaint   Patient presents with     Vaginal Problem     Patient has Norplant since 2015; started spotting       Initial /68 (BP Location: Right arm, Patient Position: Chair, Cuff Size: Adult Large)  Pulse 65  Temp 99.4  F (37.4  C) (Oral)  Resp 16  Wt 166 lb (75.3 kg)  SpO2 100%  Breastfeeding? No  BMI 29.88 kg/m2 Estimated body mass index is 29.88 kg/(m^2) as calculated from the following:    Height as of 4/13/17: 5' 2.5\" (1.588 m).    Weight as of this encounter: 166 lb (75.3 kg).  Medication Reconciliation: incomplete   Laura Whitney CMA (AAMA)      "

## 2017-07-07 NOTE — MR AVS SNAPSHOT
After Visit Summary   7/7/2017    Felicia Bermudez    MRN: [id number]           Patient Information     Date Of Birth          1975        Visit Information        Provider Department      7/7/2017 4:10 PM Renate Brantley PA-C [facility]        Today's Diagnoses     Excessive or frequent menstruation    -  1       Follow-ups after your visit        Additional Services     OB/GYN REFERRAL       Your provider has referred you to:  [facility]: [facility] [phone]   [email or web address]    [facility]: [facility] [phone]   [email or web address]    Please be aware that coverage of these services is subject to the terms and limitations of your health insurance plan.  Call member services at your health plan with any benefit or coverage questions.      Please bring the following with you to your appointment:    (1) Any X-Rays, CTs or MRIs which have been performed.  Contact the facility where they were done to arrange for  prior to your scheduled appointment.   (2) List of current medications   (3) This referral request   (4) Any documents/labs given to you for this referral                  Your next 10 appointments already scheduled     Jul 11, 2017  1:40 PM CDT   Office Visit with Jose Daniel Arredondo MD   [facility]    [address] [place] [id number] [phone]           Bring a current list of meds and any records pertaining to this visit.  For Physicals, please bring immunization records and any forms needing to be filled out.  Please arrive 10 minutes early to complete paperwork.              Who to contact     If you have questions or need follow up information about today's clinic visit or your schedule please contact [facility] directly at [phone].  Normal or non-critical lab and imaging results  "will be communicated to you by MyChart, letter or phone within 4 business days after the clinic has received the results. If you do not hear from us within 7 days, please contact the clinic through "[employer]" or phone. If you have a critical or abnormal lab result, we will notify you by phone as soon as possible.  Submit refill requests through "[employer]" or call your pharmacy and they will forward the refill request to us. Please allow 3 business days for your refill to be completed.          Additional Information About Your Visit        "[employer]" Information     "[employer]" lets you send messages to your doctor, view your test results, renew your prescriptions, schedule appointments and more. To sign up, go to [email or web address]/"[employer]" . Click on \"Log in\" on the left side of the screen, which will take you to the Welcome page. Then click on \"Sign up Now\" on the right side of the page.     You will be asked to enter the access code listed below, as well as some personal information. Please follow the directions to create your username and password.     Your access code is: [email or web address]  Expires: 2017  4:00 PM     Your access code will  in 90 days. If you need help or a new code, please call your [facility] clinic or [phone].        Care EveryWhere ID     This is your Care EveryWhere ID. This could be used by other organizations to access your [facility] medical records  [id number]        Your Vitals Were     Pulse Temperature Respirations Pulse Oximetry Breastfeeding? BMI (Body Mass Index)    65 99.4  F (37.4  C) (Oral) 16 100% No 29.88 kg/m2       Blood Pressure from Last 3 Encounters:   17 112/68   17 120/78   17 118/62    Weight from Last 3 Encounters:   17 166 lb (75.3 kg)   17 170 lb 1.6 oz (77.2 kg)   17 171 lb (77.6 kg)              We Performed the Following     OB/GYN REFERRAL        Primary Care Provider Office Phone # Fax #    Jose Daniel Arredondo [phone] " [phone]       [place] [facility] [address] [place] [address]        Equal Access to Services     EDWARD MAHER : Hadii aad ku hadjuan joséo Sosantinoali, waaxda luqadaha, qaybta kaalmada adeedin, demetri rhiannonin hayaajw dunawayjeffery cotton nadia graham. So [facility] [phone].    ATENCIÓN: Si habla español, tiene a paige disposición servicios gratuitos de asistencia lingüística. Llame al [phone].    We comply with applicable federal civil rights laws and [place] laws. We do not discriminate on the basis of race, color, national origin, age, disability sex, sexual orientation or gender identity.            Thank you!     Thank you for choosing [facility]  for your care. Our goal is always to provide you with excellent care. Hearing back from our patients is one way we can continue to improve our services. Please take a few minutes to complete the written survey that you may receive in the mail after your visit with us. Thank you!             Your Updated Medication List - Protect others around you: Learn how to safely use, store and throw away your medicines at www.disposemymeds.org.          This list is accurate as of: 7/7/17  4:34 PM.  Always use your most recent med list.                   Brand Name Dispense Instructions for use Diagnosis    albuterol 108 (90 BASE) MCG/ACT Inhaler    PROAIR HFA/PROVENTIL HFA/VENTOLIN HFA    1 Inhaler    Inhale 2 puffs into the lungs every 6 hours as needed for shortness of breath / dyspnea or wheezing    Acute bronchitis with symptoms > 10 days       BENADRYL PO           doxylamine 25 MG Tabs tablet    UNISOM    14 each    Take 1 tablet (25 mg) by mouth At Bedtime    Insomnia, unspecified type       etonogestrel 68 MG Impl    IMPLANON/NEXPLANON    1 each    1 each (68 mg) by Subdermal route once for 1 dose    Nexplanon insertion       hydrOXYzine 25 MG tablet    ATARAX    60 tablet    Take 1-2 tablets (25-50 mg) by mouth every 6 hours as needed for itching     Urticaria, unspecified       IBUPROFEN PO           MULTIVITAMIN PO           pantoprazole 20 MG EC tablet    PROTONIX    30 tablet    Take by mouth 30-60 minutes before a meal.    Gastroesophageal reflux disease, esophagitis presence not specified       * topiramate 25 MG tablet    TOPAMAX    70 tablet    Take 1 tablet (25 mg) at bedtime for 1 week, then 1 tablet twice daily for 1 week, then 1 tablet in AM and 2 in PM for 1 week, then 2 tablets twice daily.    Intractable chronic migraine without aura and without status migrainosus       * topiramate 50 MG tablet    TOPAMAX    180 tablet    Take 1 tablet (50 mg) by mouth 2 times daily    Intractable chronic migraine without aura and without status migrainosus       * Notice:  This list has 2 medication(s) that are the same as other medications prescribed for you. Read the directions carefully, and ask your doctor or other care provider to review them with you.

## 2017-07-10 ENCOUNTER — OFFICE VISIT (OUTPATIENT)
Dept: OBGYN | Facility: CLINIC | Age: 42
End: 2017-07-10
Attending: PHYSICIAN ASSISTANT
Payer: COMMERCIAL

## 2017-07-10 VITALS — SYSTOLIC BLOOD PRESSURE: 114 MMHG | DIASTOLIC BLOOD PRESSURE: 70 MMHG | TEMPERATURE: 98.6 F

## 2017-07-10 DIAGNOSIS — N93.8 DYSFUNCTIONAL UTERINE BLEEDING: Primary | ICD-10-CM

## 2017-07-10 LAB
ERYTHROCYTE [DISTWIDTH] IN BLOOD BY AUTOMATED COUNT: 13.8 % (ref 10–15)
HCT VFR BLD AUTO: 40 % (ref 35–47)
HGB BLD-MCNC: 13.3 G/DL (ref 11.7–15.7)
MCH RBC QN AUTO: 28.2 PG (ref 26.5–33)
MCHC RBC AUTO-ENTMCNC: 33.3 G/DL (ref 31.5–36.5)
MCV RBC AUTO: 85 FL (ref 78–100)
PLATELET # BLD AUTO: 183 10E9/L (ref 150–450)
RBC # BLD AUTO: 4.72 10E12/L (ref 3.8–5.2)
WBC # BLD AUTO: 8.7 10E9/L (ref 4–11)

## 2017-07-10 PROCEDURE — 36415 COLL VENOUS BLD VENIPUNCTURE: CPT | Performed by: ADVANCED PRACTICE MIDWIFE

## 2017-07-10 PROCEDURE — 99214 OFFICE O/P EST MOD 30 MIN: CPT | Performed by: ADVANCED PRACTICE MIDWIFE

## 2017-07-10 PROCEDURE — 85027 COMPLETE CBC AUTOMATED: CPT | Performed by: ADVANCED PRACTICE MIDWIFE

## 2017-07-10 NOTE — PROGRESS NOTES
Midwife Dysfunctional Uterine Bleeding Note    Date: 7/10/2017    CC:  Abnormal Uterine Bleeding    HPI:  Felicia Bermudez is a 41 year old female  presents for evaluation of abnormal uterine bleeding as a referral from Jose Daniel Arredondo      Felicia reports that she has had problems with heavy menses since she had her children when she was very young. Menses so heavy she soaked pads and tampons. Became anemic. Has tried many methods to control them, many different types of DAKOTA's (never worked for her), Mirena IUD (worked for bleeding, but migrated and had to be removed), and lastly Nexplanon. This is her second implant. The first one worked very well. This is her second one and she had this placed in 2015. Seemed to work well for her until  of this year. At that time she had two weeks of heavy bleeding, then has had irregular spotting until now. Today she is not bleeding. She is not currently sexually active. Last sexual contact was 2 years ago. Reports a negative STD screen since that time.    Duration of bleeding problem: many years, was better with Nexplanon until 2 weeks ago.  Frequency of bleeding: irregular  Flow: varies, changes a pad and tampon every 1 hours at the heaviest  Breakthrough bleeding: yes  Post-coital bleeding: no  Pelvic pain: no    Her work-up thus far for her abnormal bleeding has included:  - pregnancy test: NA, last sexual contact 2 years ago   - TSH: 1.95 on 2017  - transvaginal ultrasound: pending  - Hgb: pending   - STI screen: NA  - Last pap: 2017 normal per patient    .     GYN HISTORY:  Patient's last menstrual period was 2017 (exact date).     Menarche: 13  Menopause: no  Menses: occured every variable days lasting variable days in duration.   STI history: No STD history  History of abnormal pap: high risk HPV  History of cervical procedures: history of colp  Contraceptive History: see above  The patient is non currently sexually active.      Patient has  following risk factors for endometrial cancer:  - Unopposed estrogen exposure: No  - Obesity: Yes. Details:   - Smoking: No  - Nulliparity: No  - Infertility: No  - Early age of menarche / late age of menopause: No  - Family history of colon cancer, ovarian cancer, and type I endometrial cancer: No    OBSTETRIC HISTORY:   Obstetric History       T2      L2     SAB0   TAB0   Ectopic0   Multiple0   Live Births0       # Outcome Date GA Lbr Jw/2nd Weight Sex Delivery Anes PTL Lv   2 Term            1 Term                     Past Medical History:   Diagnosis Date     Abnormal weight gain 2017     High risk HPV infection 08/03/15    NIL, +HPV 18, plan colp     History of colposcopy 11/16/15    No bx taken     Hives     multiple allergies - food, environmental     Insomnia          Past Surgical History:   Procedure Laterality Date     SURGICAL HISTORY OF -        turbinate surgery             Family History   Problem Relation Age of Onset     Neurologic Disorder Mother 57     brain anuerysm     Hypertension Father      Other Cancer Father 70     Testicle Cancer     There is no family history of uterine, ovarian, breast, colon cancer.     Current Outpatient Prescriptions   Medication Sig Dispense Refill     pantoprazole (PROTONIX) 20 MG EC tablet Take by mouth 30-60 minutes before a meal. 30 tablet 5     hydrOXYzine (ATARAX) 25 MG tablet Take 1-2 tablets (25-50 mg) by mouth every 6 hours as needed for itching 60 tablet 1     Multiple Vitamins-Minerals (MULTIVITAMIN OR)        albuterol (PROAIR HFA, PROVENTIL HFA, VENTOLIN HFA) 108 (90 BASE) MCG/ACT inhaler Inhale 2 puffs into the lungs every 6 hours as needed for shortness of breath / dyspnea or wheezing 1 Inhaler 0     DiphenhydrAMINE HCl (BENADRYL PO)        doxylamine (UNISOM) 25 MG TABS tablet Take 1 tablet (25 mg) by mouth At Bedtime (Patient not taking: Reported on 7/10/2017) 14 each 0     topiramate (TOPAMAX) 50 MG tablet Take 1 tablet (50  mg) by mouth 2 times daily (Patient not taking: Reported on 7/10/2017) 180 tablet 3     topiramate (TOPAMAX) 25 MG tablet Take 1 tablet (25 mg) at bedtime for 1 week, then 1 tablet twice daily for 1 week, then 1 tablet in AM and 2 in PM for 1 week, then 2 tablets twice daily. (Patient not taking: Reported on 7/7/2017) 70 tablet 1     etonogestrel (IMPLANON/NEXPLANON) 68 MG IMPL 1 each (68 mg) by Subdermal route once for 1 dose 1 each 0     IBUPROFEN PO          Allergies: Ambien; Amoxicillin; Augmentin; Avocado; Bactrim [sulfamethoxazole w/trimethoprim]; Cucumber extract; Eszopiclone; and Watermelon [citrullus vulgaris]    ROS:  C: NEGATIVE for fever, chills, change in weight  I: NEGATIVE for worrisome rashes, moles or lesions  E: NEGATIVE for vision changes or irritation  E/M: NEGATIVE for ear, mouth and throat problems  R: NEGATIVE for significant cough or SOB  CV: NEGATIVE for chest pain, palpitations or peripheral edema  GI: NEGATIVE for nausea, abdominal pain, heartburn, or change in bowel habits  : POSITIVE for abnormal bleeding as above, NEGATIVE for frequency, dysuria, hematuria, vaginal discharge  M: NEGATIVE for significant arthralgias or myalgia  N: NEGATIVE for weakness, dizziness or paresthesias  E: NEGATIVE for temperature intolerance, skin/hair changes  P: NEGATIVE for changes in mood or affect    EXAM:  Blood pressure 114/70, temperature 98.6  F (37  C), temperature source Oral, last menstrual period 06/29/2017, not currently breastfeeding.   BMI= There is no height or weight on file to calculate BMI.  General - pleasant female in no acute distress.  Abdomen - soft, nontender  Pelvic - external genitalia: normal adult female without lesions or abnormalities   BUS: within normal limits  Vagina: well rugated, no discharge, no lesions  Cervix: no lesions or CMT  Uterus: Normal size, mobile and nontender  Adnexae: no masses or tenderness.  Rectovaginal - deferred.  Musculoskeletal - no gross  deformities.  Neurological - normal strength, sensation, and mental status.      ASSESSMENT:  Felicia Bermudez is a 41 year old  who presents with abnormal uterine bleeding. Discussed differential diagnosis:     PLAN:  1. Dysfunctional uterine bleeding      - CBC with platelets  - US Pelvic Complete w Transvaginal; Future       Endometrial biopsy was not today to rule out hyperplasia and malignancy.  Transvaginal ultrasound: ordered  Cervical cancer screenin2017 normal    F/U:  Patient will schedule appointment to discuss options of ablation or hysterectomy with surgeon. Patient will keep nexplanon in place until she knows what she will use to control her menses.      LANE Taylor, CNM

## 2017-07-10 NOTE — MR AVS SNAPSHOT
After Visit Summary   7/10/2017    Felicia Bermudez    MRN: [id number]           Patient Information     Date Of Birth          1975        Visit Information        Provider Department      7/10/2017 4:00 PM Cady Greene CNM [facility]        Today's Diagnoses     Dysfunctional uterine bleeding    -  1       Follow-ups after your visit        Follow-up notes from your care team     Return if symptoms worsen or fail to improve.      Your next 10 appointments already scheduled     Jul 11, 2017  1:40 PM CDT   [facility]    [address]  [address]  [facility] [address] [phone]           Bring a current list of meds and any records pertaining to this visit.  For Physicals, please bring immunization records and any forms needing to be filled out.  Please arrive 10 minutes early to complete paperwork.            Jul 25, 2017  3:30 PM CDT   US PELVIC COMPLETE W TRANSVAGINAL with RIUS1   [facility]    [address]  [address]  [facility] [address] [phone]           Please bring a list of your medicines (including vitamins, minerals and over-the-counter drugs). Also, tell your doctor about any allergies you may have. Wear comfortable clothes and leave your valuables at home.  Adults: Drink six 8-ounce glasses of fluid one hour before your exam. Do NOT empty your bladder.  If you need to empty your bladder before your exam, try to release only a little bit of urine. Then, drink another 8oz glass of fluid.  Children: Children who are potty trained should drink at least 4 cups (32 oz) of liquid 45 minutes to one hour prior to the exam. The child s bladder must be full in order to achieve a diagnostic exam. If your child is very uncomfortable or has an urgent need to pee, please notify a technologist; they will try to find  "out how much longer the wait may be and provide instructions to help relieve the pressure. Occasionally it is medically necessary to insert a urinary catheter to fill the bladder.  Please call the Imaging Department at your exam site with any questions.            Aug 07, 2017  3:00 PM CDT   Office Visit with Braulio Elkins MD   [facility]    [address]  [facility] [address]   [phone]           Bring a current list of meds and any records pertaining to this visit.  For Physicals, please bring immunization records and any forms needing to be filled out.  Please arrive 10 minutes early to complete paperwork.              Future tests that were ordered for you today     Open Future Orders        Priority Expected Expires Ordered    US Pelvic Complete w Transvaginal Routine  7/10/2018 7/10/2017            Who to contact     If you have questions or need follow up information about today's clinic visit or your schedule please contact [facility] directly at [phone].  Normal or non-critical lab and imaging results will be communicated to you by Satispayhart, letter or phone within 4 business days after the clinic has received the results. If you do not hear from us within 7 days, please contact the clinic through Gigathletet or phone. If you have a critical or abnormal lab result, we will notify you by phone as soon as possible.  Submit refill requests through [email or web address] or call your pharmacy and they will forward the refill request to us. Please allow 3 business days for your refill to be completed.          Additional Information About Your Visit        [email or web address] Information     [email or web address] lets you send messages to your doctor, view your test results, renew your prescriptions, schedule appointments and more. To sign up, go to [email or web address] . Click on \"Log in\" on the left side of the screen, which will take you to the Welcome page. " "Then click on \"Sign up Now\" on the right side of the page.     You will be asked to enter the access code listed below, as well as some personal information. Please follow the directions to create your username and password.     Your access code is: [email or web address]  Expires: 2017  4:00 PM     Your access code will  in 90 days. If you need help or a new code, please call your [facility] clinic or [phone].        Care EveryWhere ID     This is your Care EveryWhere ID. This could be used by other organizations to access your [facility] medical records  [id number]        Your Vitals Were     Temperature Last Period                98.6  F (37  C) (Oral) 2017 (Exact Date)           Blood Pressure from Last 3 Encounters:   07/10/17 114/70   17 112/68   17 120/78    Weight from Last 3 Encounters:   17 166 lb (75.3 kg)   17 170 lb 1.6 oz (77.2 kg)   17 171 lb (77.6 kg)              We Performed the Following     CBC with platelets        Primary Care Provider Office Phone # Fax #    Jose Daniel Arredondo [phone]       [facility] [address] [place] [address] [address] [place] [address]        Equal Access to Services     [facility] AH: Hadii aad ku hadasho Soomaali, waaxda luqadaha, qaybta kaalmada adeegyada, waxay galo haychicho zuniga . So [facility] [phone].    ATENCIÓN: Si habla español, tiene a paige disposición servicios gratuitos de asistencia lingüística. Llame al [phone].    We comply with applicable federal civil rights laws and [place] laws. We do not discriminate on the basis of race, color, national origin, age, disability sex, sexual orientation or gender identity.            Thank you!     Thank you for choosing [facility]  for your care. Our goal is always to provide you with excellent care. Hearing back from our patients is one way we can continue to improve our services. Please take a few minutes to complete the " written survey that you may receive in the mail after your visit with us. Thank you!             Your Updated Medication List - Protect others around you: Learn how to safely use, store and throw away your medicines at [email or web address].          This list is accurate as of: 7/10/17  5:25 PM.  Always use your most recent med list.                   Brand Name Dispense Instructions for use Diagnosis    albuterol 108 (90 BASE) MCG/ACT Inhaler    PROAIR HFA/PROVENTIL HFA/VENTOLIN HFA    1 Inhaler    Inhale 2 puffs into the lungs every 6 hours as needed for shortness of breath / dyspnea or wheezing    Acute bronchitis with symptoms > 10 days       BENADRYL PO           doxylamine 25 MG Tabs tablet    UNISOM    14 each    Take 1 tablet (25 mg) by mouth At Bedtime    Insomnia, unspecified type       etonogestrel 68 MG Impl    IMPLANON/NEXPLANON    1 each    1 each (68 mg) by Subdermal route once for 1 dose    Nexplanon insertion       hydrOXYzine 25 MG tablet    ATARAX    60 tablet    Take 1-2 tablets (25-50 mg) by mouth every 6 hours as needed for itching    Urticaria, unspecified       IBUPROFEN PO           MULTIVITAMIN PO           pantoprazole 20 MG EC tablet    PROTONIX    30 tablet    Take by mouth 30-60 minutes before a meal.    Gastroesophageal reflux disease, esophagitis presence not specified       * topiramate 25 MG tablet    TOPAMAX    70 tablet    Take 1 tablet (25 mg) at bedtime for 1 week, then 1 tablet twice daily for 1 week, then 1 tablet in AM and 2 in PM for 1 week, then 2 tablets twice daily.    Intractable chronic migraine without aura and without status migrainosus       * topiramate 50 MG tablet    TOPAMAX    180 tablet    Take 1 tablet (50 mg) by mouth 2 times daily    Intractable chronic migraine without aura and without status migrainosus       * Notice:  This list has 2 medication(s) that are the same as other medications prescribed for you. Read the directions carefully, and ask your  doctor or other care provider to review them with you.

## 2017-07-10 NOTE — NURSING NOTE
"Chief Complaint   Patient presents with     Abnormal Bleeding Problem     She uses the nexplanon to regulate her period but she has heavy spotting x 1 week, it stops and started all over again. She wants to remove the nexplanon and discuss other BC options.       Initial /70 (BP Location: Left arm, Patient Position: Sitting, Cuff Size: Adult Regular)  Temp 98.6  F (37  C) (Oral)  LMP 06/29/2017 (Exact Date) Estimated body mass index is 29.88 kg/(m^2) as calculated from the following:    Height as of 4/13/17: 5' 2.5\" (1.588 m).    Weight as of 7/7/17: 166 lb (75.3 kg).  Medication Reconciliation: complete   Yoli Lea MA      "

## 2017-07-11 ENCOUNTER — OFFICE VISIT (OUTPATIENT)
Dept: PEDIATRICS | Facility: CLINIC | Age: 42
End: 2017-07-11
Payer: COMMERCIAL

## 2017-07-11 VITALS
OXYGEN SATURATION: 99 % | SYSTOLIC BLOOD PRESSURE: 100 MMHG | BODY MASS INDEX: 29.88 KG/M2 | WEIGHT: 166 LBS | DIASTOLIC BLOOD PRESSURE: 72 MMHG | HEART RATE: 75 BPM | TEMPERATURE: 98.5 F

## 2017-07-11 DIAGNOSIS — G47.00 INSOMNIA, UNSPECIFIED TYPE: ICD-10-CM

## 2017-07-11 PROCEDURE — 99214 OFFICE O/P EST MOD 30 MIN: CPT | Performed by: INTERNAL MEDICINE

## 2017-07-11 RX ORDER — ZALEPLON 10 MG/1
10 CAPSULE ORAL AT BEDTIME
Qty: 30 CAPSULE | Refills: 0 | Status: SHIPPED | OUTPATIENT
Start: 2017-07-11 | End: 2017-08-07

## 2017-07-11 NOTE — PROGRESS NOTES
SUBJECTIVE:                                                    Felicia Bermudez is a 41 year old female who presents to clinic today for the following health issues:      Medication Followup of Topamax    Taking Medication as prescribed: Took medication for four months then stopped due to SE.     Side Effects:  Yes, including changes in mood. Sadness, tearful, and fatigue. Also makes heart race    Medication Helping Symptoms:  Yes, it was extremely helpful for migraines.        Feeling tired, castro, sad when increased to 50 mg topamax, so stopped everything 3 days ago.  Now, feels that the moodiness and sadness is better, but still tired.  Cries all the time, and gets winded easily.   No recent stressors.    Headaches are still well controlled despite being off topamax.     Walking causes her to be winded.  No chest pain.    Still not sleeping well (even on and off topamax)  This has not changed much.      Problem list and histories reviewed & adjusted, as indicated.  Additional history: as documented    Patient Active Problem List   Diagnosis     Urticaria     Multiple food allergies     PUD (peptic ulcer disease)     Abnormal Pap smear of cervix     High risk HPV infection     Abnormal weight gain     Insomnia, unspecified type     Intractable chronic migraine without aura and without status migrainosus     Past Surgical History:   Procedure Laterality Date     SURGICAL HISTORY OF -       2010 turbinate surgery       Social History   Substance Use Topics     Smoking status: Never Smoker     Smokeless tobacco: Never Used     Alcohol use No     Family History   Problem Relation Age of Onset     Neurologic Disorder Mother 57     brain anuerysm     Hypertension Father      Other Cancer Father 70     Testicle Cancer         Current Outpatient Prescriptions   Medication Sig Dispense Refill     zaleplon (SONATA) 10 MG capsule Take 1 capsule (10 mg) by mouth At Bedtime 30 capsule 0     pantoprazole (PROTONIX) 20 MG EC  tablet Take by mouth 30-60 minutes before a meal. 30 tablet 5     Multiple Vitamins-Minerals (MULTIVITAMIN OR)        albuterol (PROAIR HFA, PROVENTIL HFA, VENTOLIN HFA) 108 (90 BASE) MCG/ACT inhaler Inhale 2 puffs into the lungs every 6 hours as needed for shortness of breath / dyspnea or wheezing 1 Inhaler 0     hydrOXYzine (ATARAX) 25 MG tablet Take 1-2 tablets (25-50 mg) by mouth every 6 hours as needed for itching (Patient not taking: Reported on 7/11/2017) 60 tablet 1     etonogestrel (IMPLANON/NEXPLANON) 68 MG IMPL 1 each (68 mg) by Subdermal route once for 1 dose 1 each 0     IBUPROFEN PO        DiphenhydrAMINE HCl (BENADRYL PO)        Allergies   Allergen Reactions     Ambien      Tongue swelling     Amoxicillin      Tongue swelling     Augmentin      Tongue swelling     Avocado      Bactrim [Sulfamethoxazole W/Trimethoprim]      Tongue swelling     Cucumber Extract      Eszopiclone Swelling     Tongue swelling     Watermelon [Citrullus Vulgaris]      BP Readings from Last 3 Encounters:   07/11/17 100/72   07/10/17 114/70   07/07/17 112/68    Wt Readings from Last 3 Encounters:   07/11/17 166 lb (75.3 kg)   07/07/17 166 lb (75.3 kg)   04/13/17 170 lb 1.6 oz (77.2 kg)                  Labs reviewed in EPIC    Reviewed and updated as needed this visit by clinical staff       Reviewed and updated as needed this visit by Provider         ROS:  C: NEGATIVE for fever, chills, change in weight  E/M: NEGATIVE for ear, mouth and throat problems  R: NEGATIVE for significant cough or SOB  CV: NEGATIVE for chest pain, palpitations or peripheral edema    OBJECTIVE:                                                    /72 (BP Location: Right arm, Cuff Size: Adult Regular)  Pulse 75  Temp 98.5  F (36.9  C) (Oral)  Wt 166 lb (75.3 kg)  LMP 06/29/2017 (Exact Date)  SpO2 99%  BMI 29.88 kg/m2  Body mass index is 29.88 kg/(m^2).   GENERAL: healthy, alert, well nourished, well hydrated, no distress  HENT: ear canals-  normal; TMs- normal; Nose- normal; Mouth- no ulcers, no lesions  NECK: no tenderness, no adenopathy, no asymmetry, no masses, no stiffness; thyroid- normal to palpation  RESP: lungs clear to auscultation - no rales, no rhonchi, no wheezes  CV: regular rates and rhythm, normal S1 S2, no S3 or S4 and no murmur, no click or rub -  ABDOMEN: soft, no tenderness, no  hepatosplenomegaly, no masses, normal bowel sounds    Diagnostic test results:  Diagnostic Test Results:  none      ASSESSMENT/PLAN:                                                    1. Fatigue  Symptoms of fatigue may very well be from her topamax. Will need to see where she is at in 2 weeks, after out of her system more completely.  Moodiness and depression are better off the meds, and her headache are still at bay.    Patient Instructions   Stay off the topamax for 2-4 weeks and see if your fatigue improves.    Labs are up to date.     May try sonata as needed for sleep for the next 2-4 weeks.    Let us know if your headaches worsen again or your sadness/moodiness returns.    Jose Daniel Arredondo MD  Internal Medicine and Pediatrics         2. Insomnia:  Multiple meds not effective.  Retry sonata, now patient has different insurance.    - zaleplon (SONATA) 10 MG capsule; Take 1 capsule (10 mg) by mouth At Bedtime  Dispense: 30 capsule; Refill: 0      See Patient Instructions    Jose Daniel Arredondo MD  [facility]

## 2017-07-11 NOTE — MR AVS SNAPSHOT
After Visit Summary   7/11/2017    Felicia Bermudez    MRN: [id number]           Patient Information     Date Of Birth          1975        Visit Information        Provider Department      7/11/2017 1:40 PM Jose Daniel Arredondo MD [facility]        Today's Diagnoses     Insomnia, unspecified type          Care Instructions    Stay off the topamax for 2-4 weeks and see if your fatigue improves.    Labs are up to date.     May try sonata as needed for sleep for the next 2-4 weeks.    Let us know if your headaches worsen again or your sadness/moodiness returns.    Jose Daniel Arredondo MD  Internal Medicine and Pediatrics             Follow-ups after your visit        Your next 10 appointments already scheduled     Jul 25, 2017  3:30 PM CDT   US PELVIC COMPLETE W TRANSVAGINAL with RIUS1   [facility]    [address]  [address]  [facility] [address] [phone]           Please bring a list of your medicines (including vitamins, minerals and over-the-counter drugs). Also, tell your doctor about any allergies you may have. Wear comfortable clothes and leave your valuables at home.  Adults: Drink six 8-ounce glasses of fluid one hour before your exam. Do NOT empty your bladder.  If you need to empty your bladder before your exam, try to release only a little bit of urine. Then, drink another 8oz glass of fluid.  Children: Children who are potty trained should drink at least 4 cups (32 oz) of liquid 45 minutes to one hour prior to the exam. The child s bladder must be full in order to achieve a diagnostic exam. If your child is very uncomfortable or has an urgent need to pee, please notify a technologist; they will try to find out how much longer the wait may be and provide instructions to help relieve the pressure. Occasionally it is medically necessary to insert a urinary catheter to fill the bladder.  Please call the Imaging Department at  "your exam site with any questions.            Aug 07, 2017  3:00 PM CDT   Office Visit with Braulio Elkins MD   [facility]    [address]  [facility] [id number] [phone]           Bring a current list of meds and any records pertaining to this visit.  For Physicals, please bring immunization records and any forms needing to be filled out.  Please arrive 10 minutes early to complete paperwork.              Future tests that were ordered for you today     Open Future Orders        Priority Expected Expires Ordered    US Pelvic Complete w Transvaginal Routine  7/10/2018 7/10/2017            Who to contact     If you have questions or need follow up information about today's clinic visit or your schedule please contact [facility] directly at [phone].  Normal or non-critical lab and imaging results will be communicated to you by [employer], letter or phone within 4 business days after the clinic has received the results. If you do not hear from us within 7 days, please contact the clinic through [employer] or phone. If you have a critical or abnormal lab result, we will notify you by phone as soon as possible.  Submit refill requests through "[employer]" or call your pharmacy and they will forward the refill request to us. Please allow 3 business days for your refill to be completed.          Additional Information About Your Visit        [employer] Information     "[employer] lets you send messages to your doctor, view your test results, renew your prescriptions, schedule appointments and more. To sign up, go to [email or web address]" . Click on \"Log in\" on the left side of the screen, which will take you to the Welcome page. Then click on \"Sign up Now\" on the right side of the page.     You will be asked to enter the access code listed below, as well as some personal information. Please follow the directions to create your username and password.   "   Your access code is: [email or web address]  Expires: 2017  4:00 PM     Your access code will  in 90 days. If you need help or a new code, please call your [facility] clinic or [phone].        Care EveryWhere ID     This is your Care EveryWhere ID. This could be used by other organizations to access your [facility] medical records  [id number]        Your Vitals Were     Pulse Temperature Last Period Pulse Oximetry BMI (Body Mass Index)       75 98.5  F (36.9  C) (Oral) 2017 (Exact Date) 99% 29.88 kg/m2        Blood Pressure from Last 3 Encounters:   17 100/72   07/10/17 114/70   17 112/68    Weight from Last 3 Encounters:   17 166 lb (75.3 kg)   17 166 lb (75.3 kg)   17 170 lb 1.6 oz (77.2 kg)              We Performed the Following     MIGRAINE ACTION PLAN          Today's Medication Changes          These changes are accurate as of: 17  2:28 PM.  If you have any questions, ask your nurse or doctor.               Start taking these medicines.        Dose/Directions    zaleplon 10 MG capsule   Commonly known as:  SONATA   Used for:  Insomnia, unspecified type   Started by:  Jose Daniel Arredondo MD        Dose:  10 mg   Take 1 capsule (10 mg) by mouth At Bedtime   Quantity:  30 capsule   Refills:  0            Where to get your medicines      Some of these will need a paper prescription and others can be bought over the counter.  Ask your nurse if you have questions.     Bring a paper prescription for each of these medications     zaleplon 10 MG capsule                Primary Care Provider Office Phone # Fax #    Jose Daniel Arredondo [phone]       [place] [facility] [address] [place] [address]        Equal Access to Services     [facility] AH: Hadii derek Henriquez, waaxda luqadaha, qaybta kaalmada emmada, demetri graham. So [facility] [phone].    ATENCIÓN: Si habla español, tiene a paige disposición servicios  leticia de asistencia lingüística. Marky castillo [phone].    We comply with applicable federal civil rights laws and [place] laws. We do not discriminate on the basis of race, color, national origin, age, disability sex, sexual orientation or gender identity.            Thank you!     Thank you for choosing [facility]  for your care. Our goal is always to provide you with excellent care. Hearing back from our patients is one way we can continue to improve our services. Please take a few minutes to complete the written survey that you may receive in the mail after your visit with us. Thank you!             Your Updated Medication List - Protect others around you: Learn how to safely use, store and throw away your medicines at www.disposemymeds.org.          This list is accurate as of: 7/11/17  2:28 PM.  Always use your most recent med list.                   Brand Name Dispense Instructions for use Diagnosis    albuterol 108 (90 BASE) MCG/ACT Inhaler    PROAIR HFA/PROVENTIL HFA/VENTOLIN HFA    1 Inhaler    Inhale 2 puffs into the lungs every 6 hours as needed for shortness of breath / dyspnea or wheezing    Acute bronchitis with symptoms > 10 days       BENADRYL PO           etonogestrel 68 MG Impl    IMPLANON/NEXPLANON    1 each    1 each (68 mg) by Subdermal route once for 1 dose    Nexplanon insertion       hydrOXYzine 25 MG tablet    ATARAX    60 tablet    Take 1-2 tablets (25-50 mg) by mouth every 6 hours as needed for itching    Urticaria, unspecified       IBUPROFEN PO           MULTIVITAMIN PO           pantoprazole 20 MG EC tablet    PROTONIX    30 tablet    Take by mouth 30-60 minutes before a meal.    Gastroesophageal reflux disease, esophagitis presence not specified       zaleplon 10 MG capsule    SONATA    30 capsule    Take 1 capsule (10 mg) by mouth At Bedtime    Insomnia, unspecified type

## 2017-07-11 NOTE — PATIENT INSTRUCTIONS
Stay off the topamax for 2-4 weeks and see if your fatigue improves.    Labs are up to date.     May try sonata as needed for sleep for the next 2-4 weeks.    Let us know if your headaches worsen again or your sadness/moodiness returns.    Jose Daniel Arredondo MD  Internal Medicine and Pediatrics

## 2017-07-11 NOTE — NURSING NOTE
"Chief Complaint   Patient presents with     Recheck Medication     Topamax       Initial /72 (BP Location: Right arm, Cuff Size: Adult Regular)  Pulse 75  Temp 98.5  F (36.9  C) (Oral)  Wt 166 lb (75.3 kg)  LMP 06/29/2017 (Exact Date)  SpO2 99%  BMI 29.88 kg/m2 Estimated body mass index is 29.88 kg/(m^2) as calculated from the following:    Height as of 4/13/17: 5' 2.5\" (1.588 m).    Weight as of this encounter: 166 lb (75.3 kg).  Medication Reconciliation: complete     Kamini Raphael MA   July 11, 2017,  2:01 PM    "

## 2017-07-25 ENCOUNTER — RADIANT APPOINTMENT (OUTPATIENT)
Dept: ULTRASOUND IMAGING | Facility: CLINIC | Age: 42
End: 2017-07-25
Attending: ADVANCED PRACTICE MIDWIFE
Payer: COMMERCIAL

## 2017-07-25 DIAGNOSIS — N93.8 DYSFUNCTIONAL UTERINE BLEEDING: ICD-10-CM

## 2017-07-25 PROCEDURE — 76856 US EXAM PELVIC COMPLETE: CPT | Performed by: OBSTETRICS & GYNECOLOGY

## 2017-07-25 PROCEDURE — 76830 TRANSVAGINAL US NON-OB: CPT | Performed by: OBSTETRICS & GYNECOLOGY

## 2017-08-03 ENCOUNTER — TELEPHONE (OUTPATIENT)
Dept: PEDIATRICS | Facility: CLINIC | Age: 42
End: 2017-08-03

## 2017-08-03 DIAGNOSIS — G43.719 INTRACTABLE CHRONIC MIGRAINE WITHOUT AURA AND WITHOUT STATUS MIGRAINOSUS: ICD-10-CM

## 2017-08-03 DIAGNOSIS — G47.00 INSOMNIA, UNSPECIFIED TYPE: Primary | ICD-10-CM

## 2017-08-03 RX ORDER — GABAPENTIN 300 MG/1
300 CAPSULE ORAL AT BEDTIME
Qty: 30 CAPSULE | Refills: 0 | Status: SHIPPED | OUTPATIENT
Start: 2017-08-03 | End: 2017-08-07

## 2017-08-03 NOTE — TELEPHONE ENCOUNTER
Pt notifies the following:     - started new med(Sonato 10 mg qhs) from 07/12  - usually goes to bed at 11 pm, takes sonata minutes before heading to bed  - usually(before taking Sonato) it takes about 45 mins for her to fall asleep  - but now, she doesn't fall asleep most of the nights, is awake completely all night long  - her mind is racing all night long, unable to shut down her thoughts   - had to take atarax 25 mg 2 tabs(knocks her out completely) every 2-3 days at night to get some sleep  - has been avoiding increased intake of atarax because of SE(migraines)  - has chronic insomnia, hx of trouble falling asleep, once she falls asleep she remains asleep with no trouble   - pt doesn't want to be on Sonato anymore, would like to try different med    Please advise. Pt is aware that  might get back to her tomorrow morning, she is ok with it. Need to notify pt. Pt can be reached at [phone](OK to ).    Notes from 07/11/17:  Stay off the topamax for 2-4 weeks and see if your fatigue improves.  Labs are up to date.   May try sonata as needed for sleep for the next 2-4 weeks.  Let us know if your headaches worsen again or your sadness/moodiness returns.     Cindy, RN  Triage Nurse

## 2017-08-03 NOTE — TELEPHONE ENCOUNTER
Rx for gabapentin faxed to [place] in [place].  They will notify pt when it's ready to be picked up    Kamini Raphael MA   August 3, 2017,  4:51 PM

## 2017-08-03 NOTE — TELEPHONE ENCOUNTER
SHe has not responded (or had side effects) to sonata, ambien, lunesta.    I ordered an prescription of gabapentin 300 mg nightly before bed;    Perhaps trial of melatonin 10 mg nightly before bed also.    Follow up here in 1 month for recheck.    Jose Daniel Arredondo MD  Internal Medicine and Pediatrics

## 2017-08-07 ENCOUNTER — OFFICE VISIT (OUTPATIENT)
Dept: OBGYN | Facility: CLINIC | Age: 42
End: 2017-08-07
Payer: COMMERCIAL

## 2017-08-07 VITALS
BODY MASS INDEX: 29.46 KG/M2 | HEART RATE: 68 BPM | HEIGHT: 63 IN | DIASTOLIC BLOOD PRESSURE: 76 MMHG | WEIGHT: 166.3 LBS | SYSTOLIC BLOOD PRESSURE: 110 MMHG

## 2017-08-07 DIAGNOSIS — N92.0 MENORRHAGIA WITH REGULAR CYCLE: Primary | ICD-10-CM

## 2017-08-07 DIAGNOSIS — Z30.09 ENCOUNTER FOR OTHER GENERAL COUNSELING OR ADVICE ON CONTRACEPTION: ICD-10-CM

## 2017-08-07 PROCEDURE — 99214 OFFICE O/P EST MOD 30 MIN: CPT | Performed by: OBSTETRICS & GYNECOLOGY

## 2017-08-07 NOTE — MR AVS SNAPSHOT
"              After Visit Summary   2017    Felicia Bermudez    MRN: [id number]           Patient Information     Date Of Birth          1975        Visit Information        Provider Department      2017 3:00 PM Braulio Elkins MD [facility]        Today's Diagnoses     Menorrhagia with regular cycle    -  1    Encounter for other general counseling or advice on contraception           Follow-ups after your visit        Who to contact     If you have questions or need follow up information about today's clinic visit or your schedule please contact [facility] directly at [phone].  Normal or non-critical lab and imaging results will be communicated to you by [employer], letter or phone within 4 business days after the clinic has received the results. If you do not hear from us within 7 days, please contact the clinic through [employer] or phone. If you have a critical or abnormal lab result, we will notify you by phone as soon as possible.  Submit refill requests through [employer] or call your pharmacy and they will forward the refill request to us. Please allow 3 business days for your refill to be completed.          Additional Information About Your Visit        MyChart Information     [employer] lets you send messages to your doctor, view your test results, renew your prescriptions, schedule appointments and more. To sign up, go to [email or web address] . Click on \"Log in\" on the left side of the screen, which will take you to the Welcome page. Then click on \"Sign up Now\" on the right side of the page.     You will be asked to enter the access code listed below, as well as some personal information. Please follow the directions to create your username and password.     Your access code is: [email or web address]  Expires: 2017  3:02 PM     Your access code will  in 90 days. If you need help or a new code, please call your [facility] or [phone].        Care " "EveryWhere ID     This is your Care EveryWhere ID. This could be used by other organizations to access your [place] medical records  [id number]        Your Vitals Were     Pulse Height Last Period BMI (Body Mass Index)          68 5' 2.5\" (1.588 m) 06/29/2017 (Exact Date) 29.93 kg/m2         Blood Pressure from Last 3 Encounters:   08/29/17 122/66   08/07/17 110/76   07/11/17 100/72    Weight from Last 3 Encounters:   08/29/17 166 lb 2 oz (75.4 kg)   08/07/17 166 lb 4.8 oz (75.4 kg)   07/11/17 166 lb (75.3 kg)              Today, you had the following     No orders found for display         Today's Medication Changes          These changes are accurate as of: 8/7/17 11:59 PM.  If you have any questions, ask your nurse or doctor.               Stop taking these medicines if you haven't already. Please contact your care team if you have questions.     albuterol 108 (90 BASE) MCG/ACT Inhaler   Commonly known as:  PROAIR HFA/PROVENTIL HFA/VENTOLIN HFA   Stopped by:  Braulio Elkins MD           gabapentin 300 MG capsule   Commonly known as:  NEURONTIN   Stopped by:  Braulio Elkins MD           zaleplon 10 MG capsule   Commonly known as:  SONATA   Stopped by:  Braulio Elkins MD                    Primary Care Provider Office Phone # Fax #    Jose Daniel Arredondo [phone]       [facility] [place] [address]        Equal Access to Services     [facility] AH: Hadii aad ku hadasho Soomaali, waaxda luqadaha, qaybta kaalmada adeegyada, demetri graham. So [facility] [phone].    ATENCIÓN: Si habla español, tiene a paige disposición servicios gratuitos de asistencia lingüística. Llame al [phone].    We comply with applicable federal civil rights laws and [place] laws. We do not discriminate on the basis of race, color, national origin, age, disability sex, sexual orientation or gender identity.            Thank you!     Thank you for choosing [facility]  for your care. Our goal is always to provide you with excellent care. Hearing back from our patients is one way we can continue to improve our services. Please take a few minutes to complete the written survey that you may receive in the mail after your visit with us. Thank you!             Your Updated Medication List - Protect others around you: Learn how to safely use, store and throw away your medicines at www.disposemymeds.org.          This list is accurate as of: 8/7/17 11:59 PM.  Always use your most recent med list.                   Brand Name Dispense Instructions for use Diagnosis    BENADRYL PO           etonogestrel 68 MG Impl    IMPLANON/NEXPLANON    1 each    1 each (68 mg) by Subdermal route once for 1 dose    Nexplanon insertion       hydrOXYzine 25 MG tablet    ATARAX    60 tablet    Take 1-2 tablets (25-50 mg) by mouth every 6 hours as needed for itching    Urticaria, unspecified       IBUPROFEN PO           MULTIVITAMIN PO           pantoprazole 20 MG EC tablet    PROTONIX    30 tablet    Take by mouth 30-60 minutes before a meal.    Gastroesophageal reflux disease, esophagitis presence not specified

## 2017-08-07 NOTE — NURSING NOTE
"Chief Complaint   Patient presents with     Consult     Discuss Ablation     remove Nexplanon       Initial /76  Pulse 68  Ht 5' 2.5\" (1.588 m)  Wt 166 lb 4.8 oz (75.4 kg)  LMP 2017 (Exact Date)  BMI 29.93 kg/m2 Estimated body mass index is 29.93 kg/(m^2) as calculated from the following:    Height as of this encounter: 5' 2.5\" (1.588 m).    Weight as of this encounter: 166 lb 4.8 oz (75.4 kg).  BP completed using cuff size: regular        The following HM Due: NONE      The following patient reported/Care Every where data was sent to:  P ABSTRACT QUALITY INITIATIVES [29991]  NA     patient has appointment for today    Maritza SABA               "

## 2017-08-07 NOTE — PROGRESS NOTES
SUBJECTIVE:  Felicia Bermudez is a 41 year old,  female,  P2  who presents for menorrhagia. She has used the IUD and progestin implant in the past. Normal US and Hgb. . Periods are irregular and rare, using nexplanon which is due to be removed soon. She wants an option if heavy bleeding returns. Dysmenorrhea moderate, occurring throughout cycle. Also desires contraception.  Current contraception: implant.    The patient has a family history of Maternal Brain Aneurysm, Paternal testicular cancer.    Past Medical History:   Diagnosis Date     Abnormal weight gain 2017     High risk HPV infection 08/03/15    NIL, +HPV 18, plan colp     History of colposcopy 11/16/15    No bx taken     Hives     multiple allergies - food, environmental     Insomnia                                           Past Surgical History:   Procedure Laterality Date     SURGICAL HISTORY OF -        turbinate surgery       Current Outpatient Prescriptions   Medication     pantoprazole (PROTONIX) 20 MG EC tablet     hydrOXYzine (ATARAX) 25 MG tablet     Multiple Vitamins-Minerals (MULTIVITAMIN OR)     IBUPROFEN PO     DiphenhydrAMINE HCl (BENADRYL PO)     etonogestrel (IMPLANON/NEXPLANON) 68 MG IMPL     No current facility-administered medications for this visit.          Allergies   Allergen Reactions     Ambien      Tongue swelling     Amoxicillin      Tongue swelling     Augmentin      Tongue swelling     Avocado      Bactrim [Sulfamethoxazole W/Trimethoprim]      Tongue swelling     Cucumber Extract      Eszopiclone Swelling     Tongue swelling     Watermelon [Citrullus Vulgaris]                                                    Social History   Substance Use Topics     Smoking status: Never Smoker     Smokeless tobacco: Never Used     Alcohol use No                      Review of Systems    CONSTITUTIONAL:NEGATIVE  EYES: NEGATIVE  ENT/MOUTH: NEGATIVE  RESP: NEGATIVE  CV: NEGATIVE  GI: NEGATIVE  : NEGATIVE  MUSCULOSKELATAL:  "NEGATIVE  INTEGUMENTARY/SKIN: NEGATIVE  BREAST: NEGATIVE  NEURO: NEGATIVE.      OBJECTIVE:  /76  Pulse 68  Ht 5' 2.5\" (1.588 m)  Wt 166 lb 4.8 oz (75.4 kg)  LMP 06/29/2017 (Exact Date)  BMI 29.93 kg/m2  General appearance: Healthy.  Skin: Normal.  Mental Status: cooperative, normal affect, no gross thought process defects.  Extremities: Normal                ASSESSMENT:  Menorrhagia, desires sterilization    PLAN:    1)Pt. desires sterilization. At patient request I discussed in depth, the failure rate, permanency of the procedure, alternative forms of contraception (vasectomy, oral contraceptive pill, injectables, contraceptive patch, IUD, barrier methods). I also discussed Essure procedure. I also discussed complications of surgery such as infection, bleeding , damage to bowel or bladder, perforation of a major blood vessel.   2) Discussed endometrial ablation and hysteroscopy , D&C. Total time spent was 30 minutes. 30 minutes of face to face time spent counseling and or coordination of care regarding Menorrhagia, desires sterilization .   .  "

## 2017-08-29 ENCOUNTER — OFFICE VISIT (OUTPATIENT)
Dept: PEDIATRICS | Facility: CLINIC | Age: 42
End: 2017-08-29
Payer: COMMERCIAL

## 2017-08-29 VITALS
WEIGHT: 166.13 LBS | HEART RATE: 72 BPM | DIASTOLIC BLOOD PRESSURE: 66 MMHG | BODY MASS INDEX: 29.9 KG/M2 | TEMPERATURE: 99.1 F | OXYGEN SATURATION: 100 % | SYSTOLIC BLOOD PRESSURE: 122 MMHG

## 2017-08-29 DIAGNOSIS — Z30.46 ENCOUNTER FOR NEXPLANON REMOVAL: Primary | ICD-10-CM

## 2017-08-29 PROCEDURE — 11982 REMOVE DRUG IMPLANT DEVICE: CPT | Performed by: INTERNAL MEDICINE

## 2017-08-29 NOTE — PROGRESS NOTES
SUBJECTIVE:   Felicia Bermudez is a 41 year old female who presents to clinic today for the following health issues:    Patient presents for Nexplanon removal today. She has had this in place for almost 3 years. She has had some abnormal vaginal bleeding and thus presents for removal today.    Problem list and histories reviewed & adjusted, as indicated.  Additional history: as documented    Lot Number for Nexplanon Being Removed:  LOT# [id number]    Patient Active Problem List   Diagnosis     Urticaria     Multiple food allergies     PUD (peptic ulcer disease)     Abnormal Pap smear of cervix     High risk HPV infection     Abnormal weight gain     Insomnia, unspecified type     Intractable chronic migraine without aura and without status migrainosus     Past Surgical History:   Procedure Laterality Date     SURGICAL HISTORY OF -       2010 turbinate surgery       Social History   Substance Use Topics     Smoking status: Never Smoker     Smokeless tobacco: Never Used     Alcohol use No     Family History   Problem Relation Age of Onset     Neurologic Disorder Mother 57     brain anuerysm     Hypertension Father      Other Cancer Father 70     Testicle Cancer             Reviewed and updated as needed this visit by clinical staffTobacco  Allergies  Meds  Med Hx  Surg Hx  Fam Hx  Soc Hx      Reviewed and updated as needed this visit by Provider         ROS:  Constitutional, HEENT, cardiovascular, pulmonary, GI, , musculoskeletal, neuro, skin, endocrine and psych systems are negative, except as otherwise noted.      OBJECTIVE:   /66 (BP Location: Left arm, Patient Position: Chair, Cuff Size: Adult Regular)  Pulse 72  Temp 99.1  F (37.3  C) (Oral)  Wt 166 lb 2 oz (75.4 kg)  SpO2 100%  BMI 29.9 kg/m2  Body mass index is 29.9 kg/(m^2).    Reviewed r/b/se of procedure including risk for infection.   Explained procedure to pt.  Consent signed.    Procedure:  Removal of nexplanon    Pt was supine with R  arm at 90 degree angle.  Arm was cleansed with alcohol pad and betadine swab x3.  Anesthesia injected, lidocaine 1%, just under the distal end of the nexplanon ricardo.  2mm longitudinal incision at distal end of nexplanon ricardo.  Pressure applied to proximal end of nexplanon ricardo.  Nexplanon was removed without complication.  Measured to confirm 4cm in length.  Minimal bleeding.  Steri strips applied.  Band-aid applied to be left on for 3-5 days.  Pressure bandage applied to be left on for 24 hours.  Pt tolerated procedure without problem.    Monitor for fever, pain, discharge, or redness.  Return to clinic if these symptoms or other symptoms arise.        ASSESSMENT/PLAN:   1. Encounter for Nexplanon removal  Discussed after cares with patient, discussed back up methods after nexplanon removed.   - REMOVAL NEXPLANON      Patient Instructions     Monitor for fever, pain, discharge, or redness.  Return to clinic if these symptoms or other symptoms arise.          Aidan Faust MD, MD  [facility]

## 2017-08-29 NOTE — NURSING NOTE
"No chief complaint on file.      Initial /66 (BP Location: Left arm, Patient Position: Chair, Cuff Size: Adult Regular)  Pulse 72  Temp 99.1  F (37.3  C) (Oral)  Wt 166 lb 2 oz (75.4 kg)  SpO2 100%  BMI 29.9 kg/m2 Estimated body mass index is 29.9 kg/(m^2) as calculated from the following:    Height as of 8/7/17: 5' 2.5\" (1.588 m).    Weight as of this encounter: 166 lb 2 oz (75.4 kg).  Medication Reconciliation: complete   SMA Ajit    "

## 2017-08-29 NOTE — PATIENT INSTRUCTIONS
Monitor for fever, pain, discharge, or redness.  Return to clinic if these symptoms or other symptoms arise.

## 2017-08-29 NOTE — MR AVS SNAPSHOT
"              After Visit Summary   2017    Felicia Bermudez    MRN: [id number]           Patient Information     Date Of Birth          1975        Visit Information        Provider Department      2017 2:30 PM [facility]        Today's Diagnoses     Encounter for Nexplanon removal    -  1      Care Instructions      Monitor for fever, pain, discharge, or redness.  Return to clinic if these symptoms or other symptoms arise.            Follow-ups after your visit        Who to contact     If you have questions or need follow up information about today's clinic visit or your schedule please contact [facility] directly at [phone].  Normal or non-critical lab and imaging results will be communicated to you by [employer], letter or phone within 4 business days after the clinic has received the results. If you do not hear from us within 7 days, please contact the clinic through MyChart or phone. If you have a critical or abnormal lab result, we will notify you by phone as soon as possible.  Submit refill requests through [employer] or call your pharmacy and they will forward the refill request to us. Please allow 3 business days for your refill to be completed.          Additional Information About Your Visit        MyChart Information     [employer] lets you send messages to your doctor, view your test results, renew your prescriptions, schedule appointments and more. To sign up, go to [email or web address] . Click on \"Log in\" on the left side of the screen, which will take you to the Welcome page. Then click on \"Sign up Now\" on the right side of the page.     You will be asked to enter the access code listed below, as well as some personal information. Please follow the directions to create your username and password.     Your access code is: [email or web address]  Expires: 2017  3:02 PM     Your access code will  in 90 days. If you need help or a new code, " please call your [place] clinic or [phone].        Care EveryWhere ID     This is your Care EveryWhere ID. This could be used by other organizations to access your [place] medical records  [id number]        Your Vitals Were     Pulse Temperature Pulse Oximetry BMI (Body Mass Index)          72 99.1  F (37.3  C) (Oral) 100% 29.9 kg/m2         Blood Pressure from Last 3 Encounters:   08/29/17 122/66   08/07/17 110/76   07/11/17 100/72    Weight from Last 3 Encounters:   08/29/17 166 lb 2 oz (75.4 kg)   08/07/17 166 lb 4.8 oz (75.4 kg)   07/11/17 166 lb (75.3 kg)              We Performed the Following     [facility]        Primary Care Provider Office Phone # Fax #    Jose Daniel Arredondo [phone] [address] [place] [address]        Equal Access to Services     [facility]: Hadii aad ku hadasho Soomaali, waaxda luqadaha, qaybta kaalmada adeegyada, waxay rhiannonin haychicho zuniga . So [facility] [phone].    ATENCIÓN: Si alexyla espsatnam, tiene a paige disposición servicios gratuitos de asistencia lingüística. Llame al [phone].    We comply with applicable federal civil rights laws and [place] laws. We do not discriminate on the basis of race, color, national origin, age, disability sex, sexual orientation or gender identity.            Thank you!     Thank you for choosing [facility]  for your care. Our goal is always to provide you with excellent care. Hearing back from our patients is one way we can continue to improve our services. Please take a few minutes to complete the written survey that you may receive in the mail after your visit with us. Thank you!             Your Updated Medication List - Protect others around you: Learn how to safely use, store and throw away your medicines at www.disposemymeds.org.          This list is accurate as of: 8/29/17  3:02 PM.  Always use your most recent med list.                   Brand Name Dispense  Instructions for use Diagnosis    BENADRYL PO           etonogestrel 68 MG Impl    IMPLANON/NEXPLANON    1 each    1 each (68 mg) by Subdermal route once for 1 dose    Nexplanon insertion       hydrOXYzine 25 MG tablet    ATARAX    60 tablet    Take 1-2 tablets (25-50 mg) by mouth every 6 hours as needed for itching    Urticaria, unspecified       IBUPROFEN PO           MULTIVITAMIN PO           pantoprazole 20 MG EC tablet    PROTONIX    30 tablet    Take by mouth 30-60 minutes before a meal.    Gastroesophageal reflux disease, esophagitis presence not specified

## 2018-01-16 ENCOUNTER — TELEPHONE (OUTPATIENT)
Dept: PEDIATRICS | Facility: CLINIC | Age: 43
End: 2018-01-16

## 2018-01-16 NOTE — TELEPHONE ENCOUNTER
Patient calls.  She is only taking hydroxyzine as she stopped pantoprazole and Nexplanon was removed. Med list updated.  Patient states that she started experiencing blurry vision on hydroxyzine and was seen by an eye doctor to r/o other causes and was told that her vision is perfect, so the symptoms must be caused by the medication.  She stopped taking this.      She has an appointment scheduled and will discuss other medications with PCP at this appointment.  She denies HA, BP issues, weakness, dizziness, or any other symptoms.   Jordyn Zamarripa RN  Message handled by Nurse Triage.

## 2018-01-17 ENCOUNTER — OFFICE VISIT (OUTPATIENT)
Dept: OBGYN | Facility: CLINIC | Age: 43
End: 2018-01-17
Payer: COMMERCIAL

## 2018-01-17 ENCOUNTER — TELEPHONE (OUTPATIENT)
Dept: OBGYN | Facility: CLINIC | Age: 43
End: 2018-01-17

## 2018-01-17 VITALS — SYSTOLIC BLOOD PRESSURE: 108 MMHG | WEIGHT: 161.6 LBS | BODY MASS INDEX: 29.09 KG/M2 | DIASTOLIC BLOOD PRESSURE: 62 MMHG

## 2018-01-17 DIAGNOSIS — N92.0 MENORRHAGIA WITH REGULAR CYCLE: Primary | ICD-10-CM

## 2018-01-17 DIAGNOSIS — Z30.2 ENCOUNTER FOR STERILIZATION: ICD-10-CM

## 2018-01-17 PROCEDURE — 99213 OFFICE O/P EST LOW 20 MIN: CPT | Performed by: OBSTETRICS & GYNECOLOGY

## 2018-01-17 NOTE — TELEPHONE ENCOUNTER
Left message for patient to return the call to discuss possible dates to schedule surgery.    Per Dr. Elkins scheduled:  Hysteroscopy, D&C, Endometrial Ablation, Laparoscopic Tubal Ligation    Sterilization consent signed on 1/17/18.

## 2018-01-17 NOTE — NURSING NOTE
"Chief Complaint   Patient presents with     Consult   TL and ablation.  Caitlyn Hernandez MA      Initial /62 (BP Location: Right arm, Patient Position: Chair, Cuff Size: Adult Regular)  Wt 161 lb 9.6 oz (73.3 kg)  LMP 01/02/2018  BMI 29.09 kg/m2 Estimated body mass index is 29.09 kg/(m^2) as calculated from the following:    Height as of 8/7/17: 5' 2.5\" (1.588 m).    Weight as of this encounter: 161 lb 9.6 oz (73.3 kg).  Medication Reconciliation: complete    "

## 2018-01-17 NOTE — MR AVS SNAPSHOT
"              After Visit Summary   1/17/2018    Felicia Bermudez    MRN: [id number]           Patient Information     Date Of Birth          1975        Visit Information        Provider Department      1/17/2018 1:00 PM Braulio Elkins MD [facility]        Today's Diagnoses     Menorrhagia with regular cycle    -  1    Encounter for sterilization           Follow-ups after your visit        Your next 10 appointments already scheduled     Mar 02, 2018   Procedure with Danilo White MD   [facility] [place] [facility] (--)    [address] [facility] [id number] [phone]              Who to contact     If you have questions or need follow up information about today's clinic visit or your schedule please contact [facility] directly at [phone].  Normal or non-critical lab and imaging results will be communicated to you by MyChart, letter or phone within 4 business days after the clinic has received the results. If you do not hear from us within 7 days, please contact the clinic through MyChart or phone. If you have a critical or abnormal lab result, we will notify you by phone as soon as possible.  Submit refill requests through [employer] or call your pharmacy and they will forward the refill request to us. Please allow 3 business days for your refill to be completed.          Additional Information About Your Visit        MyChart Information     [employer] lets you send messages to your doctor, view your test results, renew your prescriptions, schedule appointments and more. To sign up, go to [email or web address] . Click on \"Log in\" on the left side of the screen, which will take you to the Welcome page. Then click on \"Sign up Now\" on the right side of the page.     You will be asked to enter the access code listed below, as well as some personal information. Please follow the directions to create your username and password.     Your access code is: " [email or web address]  Expires: 2018 11:45 AM     Your access code will  in 90 days. If you need help or a new code, please call your [place] clinic or [phone].        Care EveryWhere ID     This is your Care EveryWhere ID. This could be used by other organizations to access your [place] medical records  [id number]        Your Vitals Were     Last Period BMI (Body Mass Index)                2018 29.09 kg/m2           Blood Pressure from Last 3 Encounters:   18 110/60   18 108/62   17 122/66    Weight from Last 3 Encounters:   18 164 lb 4.8 oz (74.5 kg)   18 161 lb 9.6 oz (73.3 kg)   17 166 lb 2 oz (75.4 kg)              Today, you had the following     No orders found for display       Primary Care Provider Office Phone # Fax #    Jose Daniel Arredondo [phone] [address] [place] [address]        Equal Access to Services     [facility]: Hadii aad ku hadasho Soomaali, waaxda luqadaha, qaybta kaalmada adeedin, demetri zuniga . So [facility] [phone].    ATENCIÓN: Si habla español, tiene a paige disposición servicios gratuitos de asistencia lingüística. [facility] [phone].    We comply with applicable federal civil rights laws and [place] laws. We do not discriminate on the basis of race, color, national origin, age, disability, sex, sexual orientation, or gender identity.            Thank you!     Thank you for choosing [facility]  for your care. Our goal is always to provide you with excellent care. Hearing back from our patients is one way we can continue to improve our services. Please take a few minutes to complete the written survey that you may receive in the mail after your visit with us. Thank you!             Your Updated Medication List - Protect others around you: Learn how to safely use, store and throw away your medicines at www.disposemymeds.org.          This list is  accurate as of 1/17/18 11:59 PM.  Always use your most recent med list.                   Brand Name Dispense Instructions for use Diagnosis    IBUPROFEN PO           MULTIVITAMIN PO

## 2018-01-17 NOTE — PROGRESS NOTES
SUBJECTIVE:  Felicia Bermudez is a 41 year old,  female,  P2  who presents for menorrhagia. She has used the IUD and progestin implant in the past. Normal US and Hgb. . Periods are irregular and rare, using nexplanon which is due to be removed soon. She wants an option if heavy bleeding returns. Dysmenorrhea moderate, occurring throughout cycle. Also desires contraception.  Current contraception: implant.    The patient has a family history of Maternal Brain Aneurysm, Paternal testicular cancer.    Past Medical History:   Diagnosis Date     Abnormal weight gain 2017     High risk HPV infection 08/03/15    NIL, +HPV 18, plan colp     History of colposcopy 11/16/15    No bx taken     Hives     multiple allergies - food, environmental     Insomnia                                           Past Surgical History:   Procedure Laterality Date     SURGICAL HISTORY OF -        turbinate surgery       Current Outpatient Prescriptions   Medication     Multiple Vitamins-Minerals (MULTIVITAMIN OR)     IBUPROFEN PO     No current facility-administered medications for this visit.          Allergies   Allergen Reactions     Ambien      Tongue swelling     Amoxicillin      Tongue swelling     Augmentin      Tongue swelling     Avocado      Bactrim [Sulfamethoxazole W/Trimethoprim]      Tongue swelling     Cucumber Extract      Eszopiclone Swelling     Tongue swelling     Watermelon [Citrullus Vulgaris]                                                    Social History   Substance Use Topics     Smoking status: Never Smoker     Smokeless tobacco: Never Used     Alcohol use No                      Review of Systems    CONSTITUTIONAL:NEGATIVE  EYES: NEGATIVE  ENT/MOUTH: NEGATIVE  RESP: NEGATIVE  CV: NEGATIVE  GI: NEGATIVE  : NEGATIVE  MUSCULOSKELATAL: NEGATIVE  INTEGUMENTARY/SKIN: NEGATIVE  BREAST: NEGATIVE  NEURO: NEGATIVE.      OBJECTIVE:  /62 (BP Location: Right arm, Patient Position: Chair, Cuff Size: Adult  Regular)  Wt 161 lb 9.6 oz (73.3 kg)  LMP 01/02/2018  BMI 29.09 kg/m2  General appearance: Healthy.  Skin: Normal.  Mental Status: cooperative, normal affect, no gross thought process defects.  Extremities: Normal                ASSESSMENT:  Menorrhagia, desires sterilization    PLAN:    1)Pt. desires sterilization. At patient request I discussed in depth, the failure rate, permanency of the procedure, alternative forms of contraception (vasectomy, oral contraceptive pill, injectables, contraceptive patch, IUD, barrier methods). I also discussed Essure procedure. I also discussed complications of surgery such as infection, bleeding , damage to bowel or bladder, perforation of a major blood vessel.   2) Discussed endometrial ablation and hysteroscopy , D&C. Total time spent was 30 minutes. 30 minutes of face to face time spent counseling and or coordination of care regarding Menorrhagia, desires sterilization .   .

## 2018-01-19 ENCOUNTER — OFFICE VISIT (OUTPATIENT)
Dept: PEDIATRICS | Facility: CLINIC | Age: 43
End: 2018-01-19
Payer: COMMERCIAL

## 2018-01-19 VITALS
TEMPERATURE: 98.2 F | BODY MASS INDEX: 29.11 KG/M2 | WEIGHT: 164.3 LBS | RESPIRATION RATE: 16 BRPM | HEART RATE: 78 BPM | OXYGEN SATURATION: 100 % | DIASTOLIC BLOOD PRESSURE: 60 MMHG | SYSTOLIC BLOOD PRESSURE: 110 MMHG | HEIGHT: 63 IN

## 2018-01-19 DIAGNOSIS — G47.00 INSOMNIA, UNSPECIFIED TYPE: Primary | ICD-10-CM

## 2018-01-19 PROCEDURE — 99214 OFFICE O/P EST MOD 30 MIN: CPT | Performed by: INTERNAL MEDICINE

## 2018-01-19 RX ORDER — TRAZODONE HYDROCHLORIDE 50 MG/1
50 TABLET, FILM COATED ORAL
Qty: 30 TABLET | Refills: 1 | Status: SHIPPED | OUTPATIENT
Start: 2018-01-19 | End: 2018-04-05

## 2018-01-19 NOTE — PATIENT INSTRUCTIONS
Begin trazodone 50 mg nightly before bed.      Follow up in 1 month.    May take hydroxyzine for breakthrough itching and hives; consider revisiting with allergist.    Jose Daniel Arredondo MD  Internal Medicine and Pediatrics

## 2018-01-19 NOTE — PROGRESS NOTES
SUBJECTIVE:   Felicia Bermudez is a 42 year old female who presents to clinic today for the following health issues:      1. Medication Followup of Hydroxyzine    Taking Medication as prescribed: yes    Side Effects:  Blurred vision, triggers migraines    Medication Helping Symptoms:  Yes, helps with itching and hives     2. Wants to go on a sleep medication, has problems falling and staying asleep  Was previously on a rx but doesn't know which one (caused constipation)    Removal of nexplanon helped her body aches and lumps on breasts.     Is taking hydroxyzine now more often; it helped with itchiness and with sleep; however it give her a headache and it caused blurry vision.     Topamax had helped her migraines, but caused fatigue.      Main issue today is poor sleep; gets 3-4 hours on a good night;  1 hour on bad night.  Trouble both falling and staying asleep.  Used to be stressed about money, but has no significant stressors.   Works from 2 PM to 10 PM.  [place]  Before bedtime will listen to meditation or read a book.  Last meal is 7:00.     Has tried melatonin (nightmares), remeron, amitriptyline, temazpeam, and sonata.     Problem list and histories reviewed & adjusted, as indicated.  Additional history: as documented    Patient Active Problem List   Diagnosis     Urticaria     Multiple food allergies     PUD (peptic ulcer disease)     Abnormal Pap smear of cervix     High risk HPV infection     Abnormal weight gain     Insomnia, unspecified type     Intractable chronic migraine without aura and without status migrainosus     Menorrhagia with regular cycle     Past Surgical History:   Procedure Laterality Date     SURGICAL HISTORY OF -       2010 turbinate surgery       Social History   Substance Use Topics     Smoking status: Never Smoker     Smokeless tobacco: Never Used     Alcohol use No     Family History   Problem Relation Age of Onset     Neurologic Disorder Mother 57     brain anuerysm      "Hypertension Father      Other Cancer Father 70     Testicle Cancer     Other - See Comments Daughter 21     rare blood disorders         Current Outpatient Prescriptions   Medication Sig Dispense Refill     HYDROXYZINE HCL PO Take 20 mg by mouth       Multiple Vitamins-Minerals (MULTIVITAMIN OR)        IBUPROFEN PO        Allergies   Allergen Reactions     Ambien      Tongue swelling     Amoxicillin      Tongue swelling     Augmentin      Tongue swelling     Avocado      Bactrim [Sulfamethoxazole W/Trimethoprim]      Tongue swelling     Cucumber Extract      Eszopiclone Swelling     Tongue swelling     Watermelon [Citrullus Vulgaris]      BP Readings from Last 3 Encounters:   01/19/18 110/60   01/17/18 108/62   08/29/17 122/66    Wt Readings from Last 3 Encounters:   01/19/18 164 lb 4.8 oz (74.5 kg)   01/17/18 161 lb 9.6 oz (73.3 kg)   08/29/17 166 lb 2 oz (75.4 kg)                  Labs reviewed in EPIC        Reviewed and updated as needed this visit by clinical staffTobacco  Allergies  Med Hx  Surg Hx  Fam Hx  Soc Hx      Reviewed and updated as needed this visit by Provider         ROS:  C: NEGATIVE for fever, chills, change in weight  E/M: NEGATIVE for ear, mouth and throat problems  R: NEGATIVE for significant cough or SOB  CV: NEGATIVE for chest pain, palpitations or peripheral edema    OBJECTIVE:                                                    /60 (BP Location: Right arm, Patient Position: Chair, Cuff Size: Adult Regular)  Pulse 78  Temp 98.2  F (36.8  C) (Oral)  Resp 16  Ht 5' 2.5\" (1.588 m)  Wt 164 lb 4.8 oz (74.5 kg)  LMP 01/02/2018 (Exact Date)  SpO2 100%  Breastfeeding? No  BMI 29.57 kg/m2  Body mass index is 29.57 kg/(m^2).   GENERAL: healthy, alert, well nourished, well hydrated, no distress  HENT: ear canals- normal; TMs- normal; Nose- normal; Mouth- no ulcers, no lesions  NECK: no tenderness, no adenopathy, no asymmetry, no masses, no stiffness; thyroid- normal to " palpation  RESP: lungs clear to auscultation - no rales, no rhonchi, no wheezes  CV: regular rates and rhythm, normal S1 S2, no S3 or S4 and no murmur, no click or rub -  ABDOMEN: soft, no tenderness, no  hepatosplenomegaly, no masses, normal bowel sounds    Diagnostic test results:  Diagnostic Test Results:  none        ASSESSMENT/PLAN:                                                    1. Insomnia, unspecified type  Has been on several agents now, and avoiding benzodiazepines.  Will begin trial of trazodone.  Follow up in 1 month.   Melatononin and remeron caused nightmares.   - traZODone (DESYREL) 50 MG tablet; Take 1 tablet (50 mg) by mouth nightly as needed for sleep  Dispense: 30 tablet; Refill: 1  2.  HIves:  Discussed avoiding foods that are known to worsen her symptoms.  Follow up in 1 month or woth allergist if still needs hydroxyzine, and if this is causing her HAs to flare.     E4: Spent 25 minutes face to face.     More than 50% of the time was spent in counseling and coordination of care of these issues.        See Patient Instructions    Jose Daniel Arredondo MD  [facility]

## 2018-01-19 NOTE — NURSING NOTE
"Chief Complaint   Patient presents with     Recheck Medication       Initial /60 (BP Location: Right arm, Patient Position: Chair, Cuff Size: Adult Regular)  Pulse 78  Temp 98.2  F (36.8  C) (Oral)  Resp 16  Ht 5' 2.5\" (1.588 m)  Wt 164 lb 4.8 oz (74.5 kg)  LMP 01/02/2018 (Exact Date)  SpO2 100%  Breastfeeding? No  BMI 29.57 kg/m2 Estimated body mass index is 29.57 kg/(m^2) as calculated from the following:    Height as of this encounter: 5' 2.5\" (1.588 m).    Weight as of this encounter: 164 lb 4.8 oz (74.5 kg).  Medication Reconciliation: complete   Jaqueline Rogers CMA (AAMA)    "

## 2018-01-19 NOTE — MR AVS SNAPSHOT
"              After Visit Summary   1/19/2018    Felicia Bermudez    MRN: [id number]           Patient Information     Date Of Birth          1975        Visit Information        Provider Department      1/19/2018 11:20 AM [facility] Daniel Arredondo MD [facility]        Today's Diagnoses     Insomnia, unspecified type    -  1      Care Instructions    Begin trazodone 50 mg nightly before bed.      Follow up in 1 month.    May take hydroxyzine for breakthrough itching and hives; consider revisiting with allergist.    Jose Daniel Arredondo MD  Internal Medicine and Pediatrics             Follow-ups after your visit        Who to contact     If you have questions or need follow up information about today's clinic visit or your schedule please contact [facility] directly at [phone].  Normal or non-critical lab and imaging results will be communicated to you by [employer], letter or phone within 4 business days after the clinic has received the results. If you do not hear from us within 7 days, please contact the clinic through [employer] or phone. If you have a critical or abnormal lab result, we will notify you by phone as soon as possible.  Submit refill requests through [employer] [facility] or call your pharmacy and they will forward the refill request to us. Please allow 3 business days for your refill to be completed.          Additional Information About Your Visit        [employer] Information     [employer] [place] lets you send messages to your doctor, view your test results, renew your prescriptions, schedule appointments and more. To sign up, go to [email or web address] . Click on \"Log in\" on the left side of the screen, which will take you to the Welcome page. Then click on \"Sign up Now\" on the right side of the page.     You will be asked to enter the access code listed below, as well as some personal information. Please follow the directions to create your username and password.     Your access code is: [email or web address]  Expires: " "2018 11:45 AM     Your access code will  in 90 days. If you need help or a new code, please call your [facility] clinic or [phone].        Care EveryWhere ID     This is your Care EveryWhere ID. This could be used by other organizations to access your [facility] medical records  [id number]        Your Vitals Were     Pulse Temperature Respirations Height Last Period Pulse Oximetry    78 98.2  F (36.8  C) (Oral) 16 5' 2.5\" (1.588 m) 2018 (Exact Date) 100%    Breastfeeding? BMI (Body Mass Index)                No 29.57 kg/m2           Blood Pressure from Last 3 Encounters:   18 110/60   18 108/62   17 122/66    Weight from Last 3 Encounters:   18 164 lb 4.8 oz (74.5 kg)   18 161 lb 9.6 oz (73.3 kg)   17 166 lb 2 oz (75.4 kg)              Today, you had the following     No orders found for display         Today's Medication Changes          These changes are accurate as of: 18 11:45 AM.  If you have any questions, ask your nurse or doctor.               Start taking these medicines.        Dose/Directions    traZODone 50 MG tablet   Commonly known as:  DESYREL   Used for:  Insomnia, unspecified type   Started by:  Jose Daniel Arredondo MD        Dose:  50 mg   Take 1 tablet (50 mg) by mouth nightly as needed for sleep   Quantity:  30 tablet   Refills:  1            Where to get your medicines      These medications were sent to [facility] - [place], [place] - [address] AT [place] [address] [address]  [address] [place] [address] [place] [address]     Phone:  [phone]     traZODone 50 MG tablet                Primary Care Provider Office Phone # Fax #    Jose Daniel Arredondo [phone] [address] [place] [address]        Equal Access to Services     EDWARD MAHER AH: Hadii derek khan Sotelma, waaxda luqadaha, qaybta kaalisac sherwood, demetri worthy'aan ah. So [facility] " [phone].    ATENCIÓN: Si sophia castillo, tiene a paige disposición servicios gratuitos de asistencia lingüística. Marky castillo [phone].    We comply with applicable federal civil rights laws and [place] laws. We do not discriminate on the basis of race, color, national origin, age, disability, sex, sexual orientation, or gender identity.            Thank you!     Thank you for choosing [facility]  for your care. Our goal is always to provide you with excellent care. Hearing back from our patients is one way we can continue to improve our services. Please take a few minutes to complete the written survey that you may receive in the mail after your visit with us. Thank you!             Your Updated Medication List - Protect others around you: Learn how to safely use, store and throw away your medicines at www.disposemymeds.org.          This list is accurate as of: 1/19/18 11:45 AM.  Always use your most recent med list.                   Brand Name Dispense Instructions for use Diagnosis    HYDROXYZINE HCL PO      Take 20 mg by mouth        IBUPROFEN PO           MULTIVITAMIN PO           traZODone 50 MG tablet    DESYREL    30 tablet    Take 1 tablet (50 mg) by mouth nightly as needed for sleep    Insomnia, unspecified type

## 2018-01-25 NOTE — TELEPHONE ENCOUNTER
Surgery:  HYSTEROSCOPY, D&C, ENDOMETRIAL ABLATION, LAPAROSCOPIC TUBAL LIGATION  Date:  3/2/18  Time:  11:30 AM  Hospital:  [facility]    Patient advised of the following:  The hospital will contact you 24-48 hours prior to surgery to discuss any pre op instructions.  Contact your insurance company to see if a prior authorization or second opinion is needed.  Please schedule a pre op appointment with your primary physician within 7 days of surgery.  No aspirin or Ibuprofen 10 days prior to surgery.  Make arrangements to have someone give you a ride home from the hospital.    Surgery specific and hospital information mailed to patient.    Information was placed on the surgery calendar in [place].    Patient was offered an appointment with Dr. White prior to surgery.  Patient declined but will call back to schedule if she changes her mind.

## 2018-01-25 NOTE — TELEPHONE ENCOUNTER
PREETI Fraga  This patient was seen by Dr. Elkins, however, wanted to have her surgery scheduled on a Friday.    Surgery scheduled on 3/2/18.

## 2018-01-25 NOTE — TELEPHONE ENCOUNTER
Patient would prefer to schedule surgery on a Friday due to her work schedule.  Dr. Elkins is not available on Fridays.  Per Dr. Elkins, ok for patient to schedule with one of his partners.    Spoke with Dr. White and he agreed to do the surgery.  The patient can schedule an appointment with Dr. White in clinic prior to surgery if she wishes, otherwise Dr. White will talk with patient on the day of surgery.

## 2018-02-23 ENCOUNTER — OFFICE VISIT (OUTPATIENT)
Dept: PEDIATRICS | Facility: CLINIC | Age: 43
End: 2018-02-23
Payer: COMMERCIAL

## 2018-02-23 VITALS
TEMPERATURE: 98.1 F | OXYGEN SATURATION: 98 % | SYSTOLIC BLOOD PRESSURE: 104 MMHG | BODY MASS INDEX: 28.35 KG/M2 | DIASTOLIC BLOOD PRESSURE: 68 MMHG | HEIGHT: 63 IN | WEIGHT: 160 LBS | HEART RATE: 77 BPM

## 2018-02-23 DIAGNOSIS — Z01.818 PREOP GENERAL PHYSICAL EXAM: Primary | ICD-10-CM

## 2018-02-23 DIAGNOSIS — G47.00 INSOMNIA, UNSPECIFIED TYPE: ICD-10-CM

## 2018-02-23 DIAGNOSIS — N92.0 MENORRHAGIA WITH REGULAR CYCLE: ICD-10-CM

## 2018-02-23 PROCEDURE — 99214 OFFICE O/P EST MOD 30 MIN: CPT | Performed by: INTERNAL MEDICINE

## 2018-02-23 NOTE — PROGRESS NOTES
[facility]3]AN  [address] [place] [address]  [address]  [facility] [address]  [phone]  Dept: [phone]    PRE-OP EVALUATION:  Today's date: 2018    Felicia Bermudez (: 1975) presents for pre-operative evaluation assessment as requested by Dr. White.  She requires evaluation and anesthesia risk assessment prior to undergoing surgery/procedure for treatment of menorrhagia       Primary Physician: Jose Daniel Arredondo  Type of Anesthesia Anticipated: General    Patient has a Health Care Directive or Living Will:  NO    Preop Questions 2018   Who is doing your surgery? Dr. White   What are you having done? Hysteroscopy, Dilation and Curettage, Endometrial Ablation with Novasure, Bilateral Laparoscopic Tubal Ligation    Date of Surgery/Procedure: 18   Facility or Hospital where procedure/surgery will be performed: [facility]   1.  Do you have a history of Heart attack, stroke, stent, coronary bypass surgery, or other heart surgery? No   2.  Do you ever have any pain or discomfort in your chest? No   3.  Do you have a history of  Heart Failure? No   4.   Are you troubled by shortness of breath when:  walking on a level surface, or up a slight hill, or at night? No   5.  Do you currently have a cold, bronchitis or other respiratory infection? UNKNOWN - mild   6.  Do you have a cough, shortness of breath, or wheezing? No   7.  Do you sometimes get pains in the calves of your legs when you walk? No   8. Do you or anyone in your family have previous history of blood clots? YES - daughter, diagnosed with Budd Chiari and Polycythemia Vera.   9.  Do you or does anyone in your family have a serious bleeding problem such as prolonged bleeding following surgeries or cuts? No   10. Have you ever had problems with anemia or been told to take iron pills? No   11. Have you had any abnormal blood loss such as black, tarry or bloody stools, or abnormal vaginal bleeding? No   12.  Have you ever had a blood transfusion? No   13. Have you or any of your relatives ever had problems with anesthesia? No   14. Do you have sleep apnea, excessive snoring or daytime drowsiness? No   15. Do you have any prosthetic heart valves? No   16. Do you have prosthetic joints? No   17. Is there any chance that you may be pregnant? No         HPI:     HPI related to upcoming procedure: excessive bleeding.       See problem list for active medical problems.  Problems all longstanding and stable, except as noted/documented.  See ROS for pertinent symptoms related to these conditions.                                                                                                  .    MEDICAL HISTORY:     Patient Active Problem List    Diagnosis Date Noted     Menorrhagia with regular cycle 09/12/2017     Priority: Medium     Intractable chronic migraine without aura and without status migrainosus 04/17/2017     Priority: Medium     Insomnia, unspecified type 04/13/2017     Priority: Medium     Abnormal weight gain 02/28/2017     Priority: Medium     Abnormal Pap smear of cervix 08/03/2015     Priority: Medium     Previous HPV per patient on PAP in 2013 - was told to have yearly PAPs by Juhi MOON  08/03/15 Pap= NIL, +HPV 18  11/16/15 [place] Done. No bx taken. Dx pap NIL with Neg HPV. Plan: cotest in 1 year         High risk HPV infection 08/03/2015     Priority: Medium     08/03/15 NIL, +HPV 18, plan colp  11/16/15 [place] Done. No bx taken. Dx pap NIL with Neg HPV. Plan: cotest in 1 year  Tracking started  01/13/17 Dx pap= NIL, Neg HPV. 3 yr co-test per [facility]       Urticaria 09/16/2014     Priority: Medium     Problem list name updated by automated process. Provider to review       Multiple food allergies 09/16/2014     Priority: Medium     PUD (peptic ulcer disease) 09/16/2014     Priority: Medium      Past Medical History:   Diagnosis Date     Abnormal weight gain 2/28/2017     High risk HPV infection 08/03/15     NIL, +HPV 18, plan colp     History of colposcopy 11/16/15    No bx taken     Hives     multiple allergies - food, environmental     Insomnia      Past Surgical History:   Procedure Laterality Date     SURGICAL HISTORY OF -       2010 turbinate surgery     Current Outpatient Prescriptions   Medication Sig Dispense Refill     HYDROXYZINE HCL PO Take 20 mg by mouth       traZODone (DESYREL) 50 MG tablet Take 1 tablet (50 mg) by mouth nightly as needed for sleep 30 tablet 1     Multiple Vitamins-Minerals (MULTIVITAMIN OR)        IBUPROFEN PO        OTC products: None, except as noted above    Allergies   Allergen Reactions     Ambien      Tongue swelling     Amoxicillin      Tongue swelling     Augmentin      Tongue swelling     Avocado      Bactrim [Sulfamethoxazole W/Trimethoprim]      Tongue swelling     Cucumber Extract      Eszopiclone Swelling     Tongue swelling     Watermelon [Citrullus Vulgaris]       Latex Allergy: Yes: as well as Iodine.     Social History   Substance Use Topics     Smoking status: Never Smoker     Smokeless tobacco: Never Used     Alcohol use No     History   Drug Use No       REVIEW OF SYSTEMS:   CONSTITUTIONAL: NEGATIVE for fever, chills, change in weight  INTEGUMENTARY/SKIN: NEGATIVE for worrisome rashes, moles or lesions  EYES: NEGATIVE for vision changes or irritation  ENT/MOUTH: NEGATIVE for ear, mouth and throat problems  RESP: NEGATIVE for significant cough or SOB  BREAST: NEGATIVE for masses, tenderness or discharge  CV: NEGATIVE for chest pain, palpitations or peripheral edema  GI: NEGATIVE for nausea, abdominal pain, heartburn, or change in bowel habits  : NEGATIVE for frequency, dysuria, or hematuria  MUSCULOSKELETAL: NEGATIVE for significant arthralgias or myalgia  NEURO: NEGATIVE for weakness, dizziness or paresthesias  ENDOCRINE: NEGATIVE for temperature intolerance, skin/hair changes  HEME: NEGATIVE for bleeding problems  PSYCHIATRIC: NEGATIVE for changes in mood or  affect    EXAM:   There were no vitals taken for this visit.    GENERAL APPEARANCE: healthy, alert and no distress     EYES: EOMI, PERRL     HENT: ear canals and TM's normal and nose and mouth without ulcers or lesions     NECK: no adenopathy, no asymmetry, masses, or scars and thyroid normal to palpation     RESP: lungs clear to auscultation - no rales, rhonchi or wheezes     CV: regular rates and rhythm, normal S1 S2, no S3 or S4 and no murmur, click or rub     ABDOMEN:  soft, nontender, no HSM or masses and bowel sounds normal     MS: extremities normal- no gross deformities noted, no evidence of inflammation in joints, FROM in all extremities.     SKIN: no suspicious lesions or rashes     NEURO: Normal strength and tone, sensory exam grossly normal, mentation intact and speech normal     PSYCH: mentation appears normal. and affect normal/bright     LYMPHATICS: No cervical adenopathy    DIAGNOSTICS:   EKG: Not indicated due to non-vascular surgery and low risk of event (age <65 and without cardiac risk factors)    Recent Labs   Lab Test  07/10/17   1627  04/13/17   1759  04/13/17   1601  12/01/16   1113   HGB  13.3   --   13.7  14.3   PLT  183   --   159  189   NA   --   142   --   140   POTASSIUM   --   4.0   --   4.1   CR   --   0.66   --   0.63        IMPRESSION:   Reason for surgery/procedure: menorrhagia   Diagnosis/reason for consult: preoperative evaluation     The proposed surgical procedure is considered LOW risk.    REVISED CARDIAC RISK INDEX  The patient has the following serious cardiovascular risks for perioperative complications such as (MI, PE, VFib and 3  AV Block):  No serious cardiac risks  INTERPRETATION: 0 risks: Class I (very low risk - 0.4% complication rate)    The patient has the following additional risks for perioperative complications:  No identified additional risks      ICD-10-CM    1. Preop general physical exam Z01.818        RECOMMENDATIONS:     (Z01.818) Preop general physical  exam  (primary encounter diagnosis)  Comment:   Plan: Advised to stop all aspirin products and nonsteroidals (like ibuprofen or naproxen) for 10 days prior to procedure.  Advised follow surgical center's instructions re: arrival time and when to stop eating.     (N92.0) Menorrhagia with regular cycle  Comment:   Plan: Surgical and post-op care per primary surgical service.      (G47.00) Insomnia, unspecified type  Comment:   Plan: May continue on trazadone.      --Patient is to take all scheduled medications on the day of surgery EXCEPT for modifications listed below.    APPROVAL GIVEN to proceed with proposed procedure, without further diagnostic evaluation       Signed Electronically by: Jose Daniel Arredondo MD    Copy of this evaluation report is provided to requesting physician.    [place] Preop Guidelines

## 2018-02-23 NOTE — MR AVS SNAPSHOT
After Visit Summary   2/23/2018    Felicia Bermudez    MRN: [id number]           Patient Information     Date Of Birth          1975        Visit Information        Provider Department      2/23/2018 3:00 PM Jose Daniel Arredondo MD [facility]        Today's Diagnoses     Preop general physical exam    -  1      Care Instructions      Before Your Surgery      Call your surgeon if there is any change in your health. This includes signs of a cold or flu (such as a sore throat, runny nose, cough, rash or fever).    Do not smoke, drink alcohol or take over the counter medicine (unless your surgeon or primary care doctor tells you to) for the 24 hours before and after surgery.    If you take prescribed drugs: Follow your doctor s orders about which medicines to take and which to stop until after surgery.    Hold any aspirin or ibuprofen for 1 week.    May take tylenol for fever, aches, also robitussinDM for cold symptoms.       Eating and drinking prior to surgery: follow the instructions from your surgeon    Take a shower or bath the night before surgery. Use the soap your surgeon gave you to gently clean your skin. If you do not have soap from your surgeon, use your regular soap. Do not shave or scrub the surgery site.  Wear clean pajamas and have clean sheets on your bed.           Follow-ups after your visit        Your next 10 appointments already scheduled     Mar 02, 2018   Procedure with Danilo White MD   [facility] (--)    [address] [facility] [id number] [phone]              Who to contact     If you have questions or need follow up information about today's clinic visit or your schedule please contact [facility] directly at [phone].  Normal or non-critical lab and imaging results will be communicated to you by MyChart, letter or phone within 4 business days after the clinic has received the results. If you do not hear from us  "within 7 days, please contact the clinic through [employer] or phone. If you have a critical or abnormal lab result, we will notify you by phone as soon as possible.  Submit refill requests through [employer] or call your pharmacy and they will forward the refill request to us. Please allow 3 business days for your refill to be completed.          Additional Information About Your Visit        [employer] Information     [employer] lets you send messages to your doctor, view your test results, renew your prescriptions, schedule appointments and more. To sign up, go to [email or web address] . Click on \"Log in\" on the left side of the screen, which will take you to the Welcome page. Then click on \"Sign up Now\" on the right side of the page.     You will be asked to enter the access code listed below, as well as some personal information. Please follow the directions to create your username and password.     Your access code is: [email or web address]  Expires: 2018 11:45 AM     Your access code will  in 90 days. If you need help or a new code, please call your [facility] clinic or [phone].        Care EveryWhere ID     This is your Care EveryWhere ID. This could be used by other organizations to access your [facility] medical records  [id number]        Your Vitals Were     Pulse Temperature Height Last Period Pulse Oximetry BMI (Body Mass Index)    77 98.1  F (36.7  C) (Oral) 5' 2.5\" (1.588 m) 2018 (Exact Date) 98% 28.8 kg/m2       Blood Pressure from Last 3 Encounters:   18 104/68   18 110/60   18 108/62    Weight from Last 3 Encounters:   18 160 lb (72.6 kg)   18 164 lb 4.8 oz (74.5 kg)   18 161 lb 9.6 oz (73.3 kg)              Today, you had the following     No orders found for display       Primary Care Provider Office Phone # Fax #    Jose Daniel Arredondo [phone] [address] [place] [address]        Equal Access to Services     [facility]: " Hadii derek Rickettsali, washoaibda luqadaha, qaybta kalinda sherwood, demetri rhiannonin hayaajw dunawayjeffery cotton lavishjw santos. So [facility] [phone].    ATENCIÓN: Si habla jonathan, tiene a paige disposición servicios gratuitos de asistencia lingüística. Llame al [phone].    We comply with applicable federal civil rights laws and [place] laws. We do not discriminate on the basis of race, color, national origin, age, disability, sex, sexual orientation, or gender identity.            Thank you!     Thank you for choosing [facility]  for your care. Our goal is always to provide you with excellent care. Hearing back from our patients is one way we can continue to improve our services. Please take a few minutes to complete the written survey that you may receive in the mail after your visit with us. Thank you!             Your Updated Medication List - Protect others around you: Learn how to safely use, store and throw away your medicines at www.disposemymeds.org.          This list is accurate as of 2/23/18  3:13 PM.  Always use your most recent med list.                   Brand Name Dispense Instructions for use Diagnosis    HYDROXYZINE HCL PO      Take 20 mg by mouth        IBUPROFEN PO           MULTIVITAMIN PO           traZODone 50 MG tablet    DESYREL    30 tablet    Take 1 tablet (50 mg) by mouth nightly as needed for sleep    Insomnia, unspecified type

## 2018-02-23 NOTE — PATIENT INSTRUCTIONS
Before Your Surgery      Call your surgeon if there is any change in your health. This includes signs of a cold or flu (such as a sore throat, runny nose, cough, rash or fever).    Do not smoke, drink alcohol or take over the counter medicine (unless your surgeon or primary care doctor tells you to) for the 24 hours before and after surgery.    If you take prescribed drugs: Follow your doctor s orders about which medicines to take and which to stop until after surgery.    Hold any aspirin or ibuprofen for 1 week.    May take tylenol for fever, aches, also robitussinDM for cold symptoms.       Eating and drinking prior to surgery: follow the instructions from your surgeon    Take a shower or bath the night before surgery. Use the soap your surgeon gave you to gently clean your skin. If you do not have soap from your surgeon, use your regular soap. Do not shave or scrub the surgery site.  Wear clean pajamas and have clean sheets on your bed.

## 2018-03-02 NOTE — TELEPHONE ENCOUNTER
Surgery rescheduled - MD out ill on 3/2/18.    Surgery rescheduled to 3/16/18 at 12:05 pm with Constance at [facility] Scheduling.  Notified patient of rescheduled date/time of surgery and mailed updated surgery information to the patient.  Updated outlook calendar.

## 2018-03-15 NOTE — H&P (VIEW-ONLY)
[facility]  [address] [place] [address]  [address]  [facility] [address]  [phone]  Dept: [phone]    PRE-OP EVALUATION:  Today's date: 2018    Felicia Bermudez (: 1975) presents for pre-operative evaluation assessment as requested by Dr. White.  She requires evaluation and anesthesia risk assessment prior to undergoing surgery/procedure for treatment of menorrhagia       Primary Physician: Jose Daniel Arredondo  Type of Anesthesia Anticipated: General    Patient has a Health Care Directive or Living Will:  NO    Preop Questions 2018   Who is doing your surgery? Dr. White   What are you having done? Hysteroscopy, Dilation and Curettage, Endometrial Ablation with Novasure, Bilateral Laparoscopic Tubal Ligation    Date of Surgery/Procedure: 18   Facility or Hospital where procedure/surgery will be performed: [facility]   1.  Do you have a history of Heart attack, stroke, stent, coronary bypass surgery, or other heart surgery? No   2.  Do you ever have any pain or discomfort in your chest? No   3.  Do you have a history of  Heart Failure? No   4.   Are you troubled by shortness of breath when:  walking on a level surface, or up a slight hill, or at night? No   5.  Do you currently have a cold, bronchitis or other respiratory infection? UNKNOWN - mild   6.  Do you have a cough, shortness of breath, or wheezing? No   7.  Do you sometimes get pains in the calves of your legs when you walk? No   8. Do you or anyone in your family have previous history of blood clots? YES - daughter, diagnosed with Budd Chiari and Polycythemia Vera.   9.  Do you or does anyone in your family have a serious bleeding problem such as prolonged bleeding following surgeries or cuts? No   10. Have you ever had problems with anemia or been told to take iron pills? No   11. Have you had any abnormal blood loss such as black, tarry or bloody stools, or abnormal vaginal bleeding? No   12.  Have you ever had a blood transfusion? No   13. Have you or any of your relatives ever had problems with anesthesia? No   14. Do you have sleep apnea, excessive snoring or daytime drowsiness? No   15. Do you have any prosthetic heart valves? No   16. Do you have prosthetic joints? No   17. Is there any chance that you may be pregnant? No         HPI:     HPI related to upcoming procedure: excessive bleeding.       See problem list for active medical problems.  Problems all longstanding and stable, except as noted/documented.  See ROS for pertinent symptoms related to these conditions.                                                                                                  .    MEDICAL HISTORY:     Patient Active Problem List    Diagnosis Date Noted     Menorrhagia with regular cycle 09/12/2017     Priority: Medium     Intractable chronic migraine without aura and without status migrainosus 04/17/2017     Priority: Medium     Insomnia, unspecified type 04/13/2017     Priority: Medium     Abnormal weight gain 02/28/2017     Priority: Medium     Abnormal Pap smear of cervix 08/03/2015     Priority: Medium     Previous HPV per patient on PAP in 2013 - was told to have yearly PAPs by Juhi MOON  08/03/15 Pap= NIL, +HPV 18  11/16/15 [place] Done. No bx taken. Dx pap NIL with Neg HPV. Plan: cotest in 1 year         High risk HPV infection 08/03/2015     Priority: Medium     08/03/15 NIL, +HPV 18, plan colp  11/16/15 [place] Done. No bx taken. Dx pap NIL with Neg HPV. Plan: cotest in 1 year  Tracking started  01/13/17 Dx pap= NIL, Neg HPV. 3 yr co-test per [facility]       Urticaria 09/16/2014     Priority: Medium     Problem list name updated by automated process. Provider to review       Multiple food allergies 09/16/2014     Priority: Medium     PUD (peptic ulcer disease) 09/16/2014     Priority: Medium      Past Medical History:   Diagnosis Date     Abnormal weight gain 2/28/2017     High risk HPV infection 08/03/15     NIL, +HPV 18, plan colp     History of colposcopy 11/16/15    No bx taken     Hives     multiple allergies - food, environmental     Insomnia      Past Surgical History:   Procedure Laterality Date     SURGICAL HISTORY OF -       2010 turbinate surgery     Current Outpatient Prescriptions   Medication Sig Dispense Refill     HYDROXYZINE HCL PO Take 20 mg by mouth       traZODone (DESYREL) 50 MG tablet Take 1 tablet (50 mg) by mouth nightly as needed for sleep 30 tablet 1     Multiple Vitamins-Minerals (MULTIVITAMIN OR)        IBUPROFEN PO        OTC products: None, except as noted above    Allergies   Allergen Reactions     Ambien      Tongue swelling     Amoxicillin      Tongue swelling     Augmentin      Tongue swelling     Avocado      Bactrim [Sulfamethoxazole W/Trimethoprim]      Tongue swelling     Cucumber Extract      Eszopiclone Swelling     Tongue swelling     Watermelon [Citrullus Vulgaris]       Latex Allergy: Yes: as well as Iodine.     Social History   Substance Use Topics     Smoking status: Never Smoker     Smokeless tobacco: Never Used     Alcohol use No     History   Drug Use No       REVIEW OF SYSTEMS:   CONSTITUTIONAL: NEGATIVE for fever, chills, change in weight  INTEGUMENTARY/SKIN: NEGATIVE for worrisome rashes, moles or lesions  EYES: NEGATIVE for vision changes or irritation  ENT/MOUTH: NEGATIVE for ear, mouth and throat problems  RESP: NEGATIVE for significant cough or SOB  BREAST: NEGATIVE for masses, tenderness or discharge  CV: NEGATIVE for chest pain, palpitations or peripheral edema  GI: NEGATIVE for nausea, abdominal pain, heartburn, or change in bowel habits  : NEGATIVE for frequency, dysuria, or hematuria  MUSCULOSKELETAL: NEGATIVE for significant arthralgias or myalgia  NEURO: NEGATIVE for weakness, dizziness or paresthesias  ENDOCRINE: NEGATIVE for temperature intolerance, skin/hair changes  HEME: NEGATIVE for bleeding problems  PSYCHIATRIC: NEGATIVE for changes in mood or  affect    EXAM:   There were no vitals taken for this visit.    GENERAL APPEARANCE: healthy, alert and no distress     EYES: EOMI, PERRL     HENT: ear canals and TM's normal and nose and mouth without ulcers or lesions     NECK: no adenopathy, no asymmetry, masses, or scars and thyroid normal to palpation     RESP: lungs clear to auscultation - no rales, rhonchi or wheezes     CV: regular rates and rhythm, normal S1 S2, no S3 or S4 and no murmur, click or rub     ABDOMEN:  soft, nontender, no HSM or masses and bowel sounds normal     MS: extremities normal- no gross deformities noted, no evidence of inflammation in joints, FROM in all extremities.     SKIN: no suspicious lesions or rashes     NEURO: Normal strength and tone, sensory exam grossly normal, mentation intact and speech normal     PSYCH: mentation appears normal. and affect normal/bright     LYMPHATICS: No cervical adenopathy    DIAGNOSTICS:   EKG: Not indicated due to non-vascular surgery and low risk of event (age <65 and without cardiac risk factors)    Recent Labs   Lab Test  07/10/17   1627  04/13/17   1759  04/13/17   1601  12/01/16   1113   HGB  13.3   --   13.7  14.3   PLT  183   --   159  189   NA   --   142   --   140   POTASSIUM   --   4.0   --   4.1   CR   --   0.66   --   0.63        IMPRESSION:   Reason for surgery/procedure: menorrhagia   Diagnosis/reason for consult: preoperative evaluation     The proposed surgical procedure is considered LOW risk.    REVISED CARDIAC RISK INDEX  The patient has the following serious cardiovascular risks for perioperative complications such as (MI, PE, VFib and 3  AV Block):  No serious cardiac risks  INTERPRETATION: 0 risks: Class I (very low risk - 0.4% complication rate)    The patient has the following additional risks for perioperative complications:  No identified additional risks      ICD-10-CM    1. Preop general physical exam Z01.818        RECOMMENDATIONS:     (Z01.818) Preop general physical  exam  (primary encounter diagnosis)  Comment:   Plan: Advised to stop all aspirin products and nonsteroidals (like ibuprofen or naproxen) for 10 days prior to procedure.  Advised follow surgical center's instructions re: arrival time and when to stop eating.     (N92.0) Menorrhagia with regular cycle  Comment:   Plan: Surgical and post-op care per primary surgical service.      (G47.00) Insomnia, unspecified type  Comment:   Plan: May continue on trazadone.      --Patient is to take all scheduled medications on the day of surgery EXCEPT for modifications listed below.    APPROVAL GIVEN to proceed with proposed procedure, without further diagnostic evaluation       Signed Electronically by: Jose Daniel Arredondo MD    Copy of this evaluation report is provided to requesting physician.    [facility] Preop Guidelines

## 2018-03-16 ENCOUNTER — ANESTHESIA EVENT (OUTPATIENT)
Dept: SURGERY | Facility: CLINIC | Age: 43
End: 2018-03-16
Payer: COMMERCIAL

## 2018-03-16 ENCOUNTER — HOSPITAL ENCOUNTER (OUTPATIENT)
Facility: CLINIC | Age: 43
Discharge: HOME | End: 2018-03-16
Attending: OBSTETRICS & GYNECOLOGY | Admitting: OBSTETRICS & GYNECOLOGY
Payer: COMMERCIAL

## 2018-03-16 ENCOUNTER — ANESTHESIA (OUTPATIENT)
Dept: SURGERY | Facility: CLINIC | Age: 43
End: 2018-03-16
Payer: COMMERCIAL

## 2018-03-16 VITALS
RESPIRATION RATE: 12 BRPM | WEIGHT: 161 LBS | TEMPERATURE: 98 F | DIASTOLIC BLOOD PRESSURE: 75 MMHG | SYSTOLIC BLOOD PRESSURE: 114 MMHG | HEIGHT: 64 IN | BODY MASS INDEX: 27.49 KG/M2 | HEART RATE: 65 BPM | OXYGEN SATURATION: 98 %

## 2018-03-16 DIAGNOSIS — R10.9 POSTOPERATIVE ABDOMINAL PAIN: Primary | ICD-10-CM

## 2018-03-16 DIAGNOSIS — G89.18 POSTOPERATIVE ABDOMINAL PAIN: Primary | ICD-10-CM

## 2018-03-16 LAB — HCG UR QL: NEGATIVE

## 2018-03-16 PROCEDURE — 36000058 ZZH SURGERY LEVEL 3 EA 15 ADDTL MIN: Performed by: OBSTETRICS & GYNECOLOGY

## 2018-03-16 PROCEDURE — 58670 LAPAROSCOPY TUBAL CAUTERY: CPT | Performed by: OBSTETRICS & GYNECOLOGY

## 2018-03-16 PROCEDURE — 81025 URINE PREGNANCY TEST: CPT | Performed by: ANESTHESIOLOGY

## 2018-03-16 PROCEDURE — 58558 HYSTEROSCOPY BIOPSY: CPT | Mod: 51 | Performed by: OBSTETRICS & GYNECOLOGY

## 2018-03-16 PROCEDURE — 25000125 ZZHC RX 250: Performed by: ANESTHESIOLOGY

## 2018-03-16 PROCEDURE — 25000125 ZZHC RX 250: Performed by: NURSE ANESTHETIST, CERTIFIED REGISTERED

## 2018-03-16 PROCEDURE — 40000306 ZZH STATISTIC PRE PROC ASSESS II: Performed by: OBSTETRICS & GYNECOLOGY

## 2018-03-16 PROCEDURE — 25000128 H RX IP 250 OP 636: Performed by: ANESTHESIOLOGY

## 2018-03-16 PROCEDURE — 71000012 ZZH RECOVERY PHASE 1 LEVEL 1 FIRST HR: Performed by: OBSTETRICS & GYNECOLOGY

## 2018-03-16 PROCEDURE — 36000056 ZZH SURGERY LEVEL 3 1ST 30 MIN: Performed by: OBSTETRICS & GYNECOLOGY

## 2018-03-16 PROCEDURE — 25000128 H RX IP 250 OP 636: Performed by: NURSE ANESTHETIST, CERTIFIED REGISTERED

## 2018-03-16 PROCEDURE — C1888 ENDOVAS NON-CARDIAC ABL CATH: HCPCS | Performed by: OBSTETRICS & GYNECOLOGY

## 2018-03-16 PROCEDURE — 37000009 ZZH ANESTHESIA TECHNICAL FEE, EACH ADDTL 15 MIN: Performed by: OBSTETRICS & GYNECOLOGY

## 2018-03-16 PROCEDURE — 71000027 ZZH RECOVERY PHASE 2 EACH 15 MINS: Performed by: OBSTETRICS & GYNECOLOGY

## 2018-03-16 PROCEDURE — 25000566 ZZH SEVOFLURANE, EA 15 MIN: Performed by: OBSTETRICS & GYNECOLOGY

## 2018-03-16 PROCEDURE — 25000128 H RX IP 250 OP 636: Performed by: OBSTETRICS & GYNECOLOGY

## 2018-03-16 PROCEDURE — 27210794 ZZH OR GENERAL SUPPLY STERILE: Performed by: OBSTETRICS & GYNECOLOGY

## 2018-03-16 PROCEDURE — 71000013 ZZH RECOVERY PHASE 1 LEVEL 1 EA ADDTL HR: Performed by: OBSTETRICS & GYNECOLOGY

## 2018-03-16 PROCEDURE — 37000008 ZZH ANESTHESIA TECHNICAL FEE, 1ST 30 MIN: Performed by: OBSTETRICS & GYNECOLOGY

## 2018-03-16 RX ORDER — LIDOCAINE 40 MG/G
CREAM TOPICAL
Status: DISCONTINUED | OUTPATIENT
Start: 2018-03-16 | End: 2018-03-16 | Stop reason: HOSPADM

## 2018-03-16 RX ORDER — SODIUM CHLORIDE, SODIUM LACTATE, POTASSIUM CHLORIDE, CALCIUM CHLORIDE 600; 310; 30; 20 MG/100ML; MG/100ML; MG/100ML; MG/100ML
INJECTION, SOLUTION INTRAVENOUS CONTINUOUS
Status: DISCONTINUED | OUTPATIENT
Start: 2018-03-16 | End: 2018-03-16 | Stop reason: HOSPADM

## 2018-03-16 RX ORDER — CEFAZOLIN SODIUM 1 G/3ML
INJECTION, POWDER, FOR SOLUTION INTRAMUSCULAR; INTRAVENOUS PRN
Status: DISCONTINUED | OUTPATIENT
Start: 2018-03-16 | End: 2018-03-16

## 2018-03-16 RX ORDER — NEOSTIGMINE METHYLSULFATE 1 MG/ML
VIAL (ML) INJECTION PRN
Status: DISCONTINUED | OUTPATIENT
Start: 2018-03-16 | End: 2018-03-16

## 2018-03-16 RX ORDER — HYDROMORPHONE HYDROCHLORIDE 1 MG/ML
.3-.5 INJECTION, SOLUTION INTRAMUSCULAR; INTRAVENOUS; SUBCUTANEOUS EVERY 10 MIN PRN
Status: DISCONTINUED | OUTPATIENT
Start: 2018-03-16 | End: 2018-03-16 | Stop reason: HOSPADM

## 2018-03-16 RX ORDER — GLYCOPYRROLATE 0.2 MG/ML
INJECTION, SOLUTION INTRAMUSCULAR; INTRAVENOUS PRN
Status: DISCONTINUED | OUTPATIENT
Start: 2018-03-16 | End: 2018-03-16

## 2018-03-16 RX ORDER — FENTANYL CITRATE 50 UG/ML
25-50 INJECTION, SOLUTION INTRAMUSCULAR; INTRAVENOUS
Status: DISCONTINUED | OUTPATIENT
Start: 2018-03-16 | End: 2018-03-16 | Stop reason: HOSPADM

## 2018-03-16 RX ORDER — ONDANSETRON 2 MG/ML
4 INJECTION INTRAMUSCULAR; INTRAVENOUS EVERY 30 MIN PRN
Status: DISCONTINUED | OUTPATIENT
Start: 2018-03-16 | End: 2018-03-16 | Stop reason: HOSPADM

## 2018-03-16 RX ORDER — KETAMINE HYDROCHLORIDE 10 MG/ML
INJECTION INTRAMUSCULAR; INTRAVENOUS PRN
Status: DISCONTINUED | OUTPATIENT
Start: 2018-03-16 | End: 2018-03-16

## 2018-03-16 RX ORDER — MEPERIDINE HYDROCHLORIDE 50 MG/ML
25 INJECTION INTRAMUSCULAR; INTRAVENOUS; SUBCUTANEOUS ONCE
Status: DISCONTINUED | OUTPATIENT
Start: 2018-03-16 | End: 2018-03-16 | Stop reason: HOSPADM

## 2018-03-16 RX ORDER — FENTANYL CITRATE 50 UG/ML
25-50 INJECTION, SOLUTION INTRAMUSCULAR; INTRAVENOUS EVERY 5 MIN PRN
Status: DISCONTINUED | OUTPATIENT
Start: 2018-03-16 | End: 2018-03-16 | Stop reason: HOSPADM

## 2018-03-16 RX ORDER — HYDROCODONE BITARTRATE AND ACETAMINOPHEN 5; 325 MG/1; MG/1
2 TABLET ORAL
Status: DISCONTINUED | OUTPATIENT
Start: 2018-03-16 | End: 2018-03-16 | Stop reason: HOSPADM

## 2018-03-16 RX ORDER — MEPERIDINE HYDROCHLORIDE 50 MG/ML
12.5 INJECTION INTRAMUSCULAR; INTRAVENOUS; SUBCUTANEOUS
Status: DISCONTINUED | OUTPATIENT
Start: 2018-03-16 | End: 2018-03-16 | Stop reason: HOSPADM

## 2018-03-16 RX ORDER — PROPOFOL 10 MG/ML
INJECTION, EMULSION INTRAVENOUS PRN
Status: DISCONTINUED | OUTPATIENT
Start: 2018-03-16 | End: 2018-03-16

## 2018-03-16 RX ORDER — ONDANSETRON 4 MG/1
4 TABLET, ORALLY DISINTEGRATING ORAL EVERY 30 MIN PRN
Status: DISCONTINUED | OUTPATIENT
Start: 2018-03-16 | End: 2018-03-16 | Stop reason: HOSPADM

## 2018-03-16 RX ORDER — FENTANYL CITRATE 50 UG/ML
INJECTION, SOLUTION INTRAMUSCULAR; INTRAVENOUS PRN
Status: DISCONTINUED | OUTPATIENT
Start: 2018-03-16 | End: 2018-03-16

## 2018-03-16 RX ORDER — ONDANSETRON 4 MG/1
4-8 TABLET, ORALLY DISINTEGRATING ORAL EVERY 8 HOURS PRN
Qty: 4 TABLET | Refills: 0 | Status: SHIPPED | OUTPATIENT
Start: 2018-03-16 | End: 2018-04-12

## 2018-03-16 RX ORDER — DIPHENHYDRAMINE HYDROCHLORIDE 50 MG/ML
INJECTION INTRAMUSCULAR; INTRAVENOUS PRN
Status: DISCONTINUED | OUTPATIENT
Start: 2018-03-16 | End: 2018-03-16

## 2018-03-16 RX ORDER — ONDANSETRON 4 MG/1
4 TABLET, ORALLY DISINTEGRATING ORAL
Status: DISCONTINUED | OUTPATIENT
Start: 2018-03-16 | End: 2018-03-16 | Stop reason: HOSPADM

## 2018-03-16 RX ORDER — ALBUTEROL SULFATE 0.83 MG/ML
2.5 SOLUTION RESPIRATORY (INHALATION) EVERY 4 HOURS PRN
Status: DISCONTINUED | OUTPATIENT
Start: 2018-03-16 | End: 2018-03-16 | Stop reason: HOSPADM

## 2018-03-16 RX ORDER — ONDANSETRON 2 MG/ML
INJECTION INTRAMUSCULAR; INTRAVENOUS PRN
Status: DISCONTINUED | OUTPATIENT
Start: 2018-03-16 | End: 2018-03-16

## 2018-03-16 RX ORDER — METOPROLOL TARTRATE 1 MG/ML
1-2 INJECTION, SOLUTION INTRAVENOUS EVERY 5 MIN PRN
Status: DISCONTINUED | OUTPATIENT
Start: 2018-03-16 | End: 2018-03-16 | Stop reason: HOSPADM

## 2018-03-16 RX ORDER — NALOXONE HYDROCHLORIDE 0.4 MG/ML
.1-.4 INJECTION, SOLUTION INTRAMUSCULAR; INTRAVENOUS; SUBCUTANEOUS
Status: DISCONTINUED | OUTPATIENT
Start: 2018-03-16 | End: 2018-03-16 | Stop reason: HOSPADM

## 2018-03-16 RX ORDER — HYDRALAZINE HYDROCHLORIDE 20 MG/ML
2.5-5 INJECTION INTRAMUSCULAR; INTRAVENOUS EVERY 10 MIN PRN
Status: DISCONTINUED | OUTPATIENT
Start: 2018-03-16 | End: 2018-03-16 | Stop reason: HOSPADM

## 2018-03-16 RX ORDER — KETOROLAC TROMETHAMINE 30 MG/ML
30 INJECTION, SOLUTION INTRAMUSCULAR; INTRAVENOUS ONCE
Status: COMPLETED | OUTPATIENT
Start: 2018-03-16 | End: 2018-03-16

## 2018-03-16 RX ORDER — EPHEDRINE SULFATE 50 MG/ML
10 INJECTION, SOLUTION INTRAVENOUS ONCE
Status: DISCONTINUED | OUTPATIENT
Start: 2018-03-16 | End: 2018-03-16

## 2018-03-16 RX ORDER — OXYCODONE HYDROCHLORIDE 5 MG/1
5 TABLET ORAL EVERY 4 HOURS PRN
Status: DISCONTINUED | OUTPATIENT
Start: 2018-03-16 | End: 2018-03-16 | Stop reason: HOSPADM

## 2018-03-16 RX ORDER — PROMETHAZINE HYDROCHLORIDE 25 MG/ML
25 INJECTION, SOLUTION INTRAMUSCULAR; INTRAVENOUS ONCE
Status: COMPLETED | OUTPATIENT
Start: 2018-03-16 | End: 2018-03-16

## 2018-03-16 RX ORDER — HYDROCODONE BITARTRATE AND ACETAMINOPHEN 5; 325 MG/1; MG/1
1-2 TABLET ORAL EVERY 4 HOURS PRN
Qty: 30 TABLET | Refills: 0 | Status: SHIPPED | OUTPATIENT
Start: 2018-03-16 | End: 2018-03-30

## 2018-03-16 RX ORDER — DEXAMETHASONE SODIUM PHOSPHATE 4 MG/ML
INJECTION, SOLUTION INTRA-ARTICULAR; INTRALESIONAL; INTRAMUSCULAR; INTRAVENOUS; SOFT TISSUE PRN
Status: DISCONTINUED | OUTPATIENT
Start: 2018-03-16 | End: 2018-03-16

## 2018-03-16 RX ORDER — KETOROLAC TROMETHAMINE 30 MG/ML
30 INJECTION, SOLUTION INTRAMUSCULAR; INTRAVENOUS EVERY 6 HOURS PRN
Status: DISCONTINUED | OUTPATIENT
Start: 2018-03-16 | End: 2018-03-16 | Stop reason: HOSPADM

## 2018-03-16 RX ORDER — EPHEDRINE SULFATE 50 MG/ML
25 INJECTION, SOLUTION INTRAVENOUS ONCE
Status: COMPLETED | OUTPATIENT
Start: 2018-03-16 | End: 2018-03-16

## 2018-03-16 RX ADMIN — Medication 3 MG: at 13:52

## 2018-03-16 RX ADMIN — KETAMINE HYDROCHLORIDE 25 MG: 10 INJECTION, SOLUTION INTRAMUSCULAR; INTRAVENOUS at 13:00

## 2018-03-16 RX ADMIN — EPHEDRINE SULFATE 25 MG: 50 INJECTION INTRAMUSCULAR; INTRAVENOUS; SUBCUTANEOUS at 17:14

## 2018-03-16 RX ADMIN — KETOROLAC TROMETHAMINE 30 MG: 30 INJECTION, SOLUTION INTRAMUSCULAR at 11:49

## 2018-03-16 RX ADMIN — FENTANYL CITRATE 100 MCG: 50 INJECTION, SOLUTION INTRAMUSCULAR; INTRAVENOUS at 12:38

## 2018-03-16 RX ADMIN — ONDANSETRON 4 MG: 2 INJECTION INTRAMUSCULAR; INTRAVENOUS at 13:35

## 2018-03-16 RX ADMIN — ROCURONIUM BROMIDE 20 MG: 10 INJECTION INTRAVENOUS at 12:38

## 2018-03-16 RX ADMIN — GLYCOPYRROLATE 0.2 MG: 0.2 INJECTION, SOLUTION INTRAMUSCULAR; INTRAVENOUS at 12:31

## 2018-03-16 RX ADMIN — MIDAZOLAM 2 MG: 1 INJECTION INTRAMUSCULAR; INTRAVENOUS at 12:31

## 2018-03-16 RX ADMIN — DEXAMETHASONE SODIUM PHOSPHATE 8 MG: 4 INJECTION, SOLUTION INTRA-ARTICULAR; INTRALESIONAL; INTRAMUSCULAR; INTRAVENOUS; SOFT TISSUE at 12:38

## 2018-03-16 RX ADMIN — GLYCOPYRROLATE 0.4 MG: 0.2 INJECTION, SOLUTION INTRAMUSCULAR; INTRAVENOUS at 13:52

## 2018-03-16 RX ADMIN — PROMETHAZINE HYDROCHLORIDE 25 MG: 25 INJECTION INTRAMUSCULAR; INTRAVENOUS at 17:13

## 2018-03-16 RX ADMIN — SODIUM CHLORIDE, POTASSIUM CHLORIDE, SODIUM LACTATE AND CALCIUM CHLORIDE: 600; 310; 30; 20 INJECTION, SOLUTION INTRAVENOUS at 12:31

## 2018-03-16 RX ADMIN — DIPHENHYDRAMINE HYDROCHLORIDE 25 MG: 50 INJECTION, SOLUTION INTRAMUSCULAR; INTRAVENOUS at 13:18

## 2018-03-16 RX ADMIN — ONDANSETRON 4 MG: 2 INJECTION INTRAMUSCULAR; INTRAVENOUS at 14:40

## 2018-03-16 RX ADMIN — GENTAMICIN SULFATE 100 MG: 40 INJECTION, SOLUTION INTRAMUSCULAR; INTRAVENOUS at 13:48

## 2018-03-16 RX ADMIN — PROPOFOL 150 MG: 10 INJECTION, EMULSION INTRAVENOUS at 12:38

## 2018-03-16 RX ADMIN — Medication 50 MG: at 12:38

## 2018-03-16 NOTE — ANESTHESIA CARE TRANSFER NOTE
Patient: Felicia Bermudez    Procedure(s):  Hysteroscopy, Dilation and Curettage, unable to perform Endometrial Ablation with Novasure secondary to uterine perforation, diagnostic laparoscopy, Bilateral Laparoscopic Tubal Ligation  - Wound Class: II-Clean Contaminated   - Wound Class: II-Clean Contaminated    Diagnosis: Elective Sterilization, Menorrhagia   Diagnosis Additional Information: No value filed.    Anesthesia Type:   General, ETT     Note:  Airway :Face Mask  Patient transferred to:PACU  Handoff Report: Identifed the Patient, Identified the Reponsible Provider, Reviewed the pertinent medical history, Discussed the surgical course, Reviewed Intra-OP anesthesia mangement and issues during anesthesia, Set expectations for post-procedure period and Allowed opportunity for questions and acknowledgement of understanding      Vitals: (Last set prior to Anesthesia Care Transfer)    CRNA VITALS  3/16/2018 1343 - 3/16/2018 1413      3/16/2018             Pulse: 99    SpO2: 100 %                Electronically Signed By: LANE Bernstein CRNA  March 16, 2018  2:13 PM

## 2018-03-16 NOTE — DISCHARGE INSTRUCTIONS
DR. CESAR DONALDSON M.D.  CLINIC PHONE NUMBER:  [phone]    DILATION AND CURETTAGE DISCHARGE INSTRUCTIONS    PLEASE RETURN TO THE CLINIC IN:  1-2 WEEKS    MAKE THIS APPOINTMENT AFTER YOU GET HOME IF IT HAS NOT ALREADY BEEN SCHEDULED.    DO NOT DRIVE A CAR, DRINK ALCOHOL OR USE MACHINERY FOR THE NEXT 24 HOURS.  YOU SHOULD WAIT UNTIL YOU HAVE RECOVERED BEFORE MAKING ANY IMPORTANT DECISIONS.    PAIN AND DISCOMFORT  YOU MAY HAVE CRAMPS OR A LOW BACKACHE FOR 24 TO 48 HOURS.  TYLENOL (ACETAMINOPHEN) OR MOTRIN (IBUPROFEN) MAY HELP, OR YOUR DOCTOR MAY GIVE YOU PAIN MEDICINE.  CALL YOUR DOCTOR IF PAIN CANNOT BE CONTROLLED.  YOU MAY FEEL DROWSY AND WEAK FOR A DAY OR TWO.    VAGINAL DISCHARGE  YOU MAY HAVE SOME BLEEDING OR DISCHARGE FOR UP TO TWO WEEKS.  DO NOT DOUCHE, USE TAMPONS OR HAVE SEX (INTERCOURSE) IN THE FIRST WEEK.  CALL YOUR DOCTOR IF YOU SOAK MORE THAN ONE MAXI PAD (SANITARY NAPKIN) PER HOUR, OR IF YOU PASS LARGE BLOOD CLOTS.    OTHER SYMPTOMS  YOU MAY HAVE A LOW FEVER FOR THE FIRST TWO DAYS.  CALL YOUR DOCTOR IF YOUR FEVER GOES OVER 101 DEGREES FAHRENHEIT.    IF YOU HAVE NAUSEA (FEEL SICK TO YOUR STOMACH), STAY IN BED.  TRY DRINKING A SMALL AMOUNT 7-UP, TEA OR SOUP.    DIET AND ACTIVITY  EAT LIGHT MEALS AND DRINK PLENTY OF FLUIDS FOR THE FIRST 24 HOURS (OR LONGER, IF YOU HAVE NAUSEA).    YOU MAY BATHE, SHOWER AND CLIMB STAIRS.  MOST WOMEN CAN RETURN TO WORK AFTER 24 HOURS.  YOU MAY GO BACK TO YOUR OTHER ACTIVITIES AFTER YOUR PAIN GOES AWAY.          GENERAL ANESTHESIA OR SEDATION ADULT DISCHARGE INSTRUCTIONS   SPECIAL PRECAUTIONS FOR 24 HOURS AFTER SURGERY    IT IS NOT UNUSUAL TO FEEL LIGHT-HEADED OR FAINT, UP TO 24 HOURS AFTER SURGERY OR WHILE TAKING PAIN MEDICATION.  IF YOU HAVE THESE SYMPTOMS; SIT FOR A FEW MINUTES BEFORE STANDING AND HAVE SOMEONE ASSIST YOU WHEN YOU GET UP TO WALK OR USE THE BATHROOM.    YOU SHOULD REST AND RELAX FOR THE NEXT 24 HOURS AND YOU MUST MAKE ARRANGEMENTS TO HAVE SOMEONE STAY  WITH YOU FOR AT LEAST 24 HOURS AFTER YOUR DISCHARGE.  AVOID HAZARDOUS AND STRENUOUS ACTIVITIES.  DO NOT MAKE IMPORTANT DECISIONS FOR 24 HOURS.    DO NOT DRIVE ANY VEHICLE OR OPERATE MECHANICAL EQUIPMENT FOR 24 HOURS FOLLOWING THE END OF YOUR SURGERY.  EVEN THOUGH YOU MAY FEEL NORMAL, YOUR REACTIONS MAY BE AFFECTED BY THE MEDICATION YOU HAVE RECEIVED.    DO NOT DRINK ALCOHOLIC BEVERAGES FOR 24 HOURS FOLLOWING YOUR SURGERY.    DRINK CLEAR LIQUIDS (APPLE JUICE, GINGER ALE, 7-UP, BROTH, ETC.).  PROGRESS TO YOUR REGULAR DIET AS YOU FEEL ABLE.    YOU MAY HAVE A DRY MOUTH, A SORE THROAT, MUSCLES ACHES OR TROUBLE SLEEPING.  THESE SHOULD GO AWAY AFTER 24 HOURS.    CALL YOUR DOCTOR FOR ANY OF THE FOLLOWING:  SIGNS OF INFECTION (FEVER, GROWING TENDERNESS AT THE SURGERY SITE, A LARGE AMOUNT OF DRAINAGE OR BLEEDING, SEVERE PAIN, FOUL-SMELLING DRAINAGE, REDNESS OR SWELLING.    IT HAS BEEN OVER 8 TO 10 HOURS SINCE SURGERY AND YOU ARE STILL NOT ABLE TO URINATE (PASS WATER).         MEDICATIONS YOU RECEIVED:  *You received Toradol, an IV form of ibuprofen (Motrin) at 11:50 AM.  Do not take any ibuprofen products until 5:50 PM.  *Maximum acetaminophen (Tylenol) dose from all sources should not exceed 4 grams (4000 mg) per day.

## 2018-03-16 NOTE — ANESTHESIA PREPROCEDURE EVALUATION
Anesthesia Evaluation     . Pt has had prior anesthetic.     No history of anesthetic complications          ROS/MED HX    ENT/Pulmonary:  - neg pulmonary ROS     Neurologic:     (+)migraines,     Cardiovascular:  - neg cardiovascular ROS       METS/Exercise Tolerance:     Hematologic:         Musculoskeletal:         GI/Hepatic:         Renal/Genitourinary:     (+) Other Renal/ Genitourinary, menorrhagia      Endo:         Psychiatric:         Infectious Disease:         Malignancy:         Other:                     Physical Exam      Airway   Mallampati: II  TM distance: >3 FB  Neck ROM: full    Dental     Cardiovascular   Rhythm and rate: regular and normal      Pulmonary    breath sounds clear to auscultation                    Anesthesia Plan      History & Physical Review  History and physical reviewed and following examination; no interval change.    ASA Status:  2 .    NPO Status:  > 8 hours    Plan for General and ETT with Intravenous and Propofol induction. Maintenance will be Balanced.    PONV prophylaxis:  Ondansetron (or other 5HT-3) and Dexamethasone or Solumedrol       Postoperative Care  Postoperative pain management:  IV analgesics, Oral pain medications and Multi-modal analgesia.      Consents  Anesthetic plan, risks, benefits and alternatives discussed with:  Patient..                          .

## 2018-03-16 NOTE — IP AVS SNAPSHOT
MRN:[id number]                      After Visit Summary   3/16/2018    Felicia Bermudez    MRN: [id number]           Thank you!     Thank you for choosing [facility] for your care. Our goal is always to provide you with excellent care. Hearing back from our patients is one way we can continue to improve our services. Please take a few minutes to complete the written survey that you may receive in the mail after you visit. If you would like to speak to someone directly about your visit please contact Patient Relations at [phone]. Thank you!          Patient Information     Date Of Birth          1975        About your hospital stay     You were admitted on:  March 16, 2018 You last received care in the:  [facility] PreOP/PostOP    You were discharged on:  March 16, 2018       Who to Call     For medical emergencies, please call 911.  For non-urgent questions about your medical care, please call your primary care provider or clinic, [phone]  For questions related to your surgery, please call your surgery clinic        Attending Provider     Provider Danilo Irby MD OB/Gyn       Primary Care Provider Office Phone # Fax #    Jose Daniel Arredondo [phone]      After Care Instructions     Discharge Instructions       Resume pre procedure diet            Discharge Instructions       Pelvic Rest. No tampons, douching or intercourse for  2  weeks.            Discharge Instructions       Patient to arrange follow up appointment in 1-2  weeks            Dressing       Keep dressing clean and dry, change as instructed by Provider or RN  Remove bandaids one postop day one            Shower        Shower on Post-op day  1                  Further instructions from your care team       DR. DANILO DONALDSON M.D.  CLINIC PHONE NUMBER:  [phone]    DILATION AND CURETTAGE DISCHARGE INSTRUCTIONS    PLEASE RETURN TO THE CLINIC IN:  1-2 WEEKS    MAKE  THIS APPOINTMENT AFTER YOU GET HOME IF IT HAS NOT ALREADY BEEN SCHEDULED.    DO NOT DRIVE A CAR, DRINK ALCOHOL OR USE MACHINERY FOR THE NEXT 24 HOURS.  YOU SHOULD WAIT UNTIL YOU HAVE RECOVERED BEFORE MAKING ANY IMPORTANT DECISIONS.    PAIN AND DISCOMFORT  YOU MAY HAVE CRAMPS OR A LOW BACKACHE FOR 24 TO 48 HOURS.  TYLENOL (ACETAMINOPHEN) OR MOTRIN (IBUPROFEN) MAY HELP, OR YOUR DOCTOR MAY GIVE YOU PAIN MEDICINE.  CALL YOUR DOCTOR IF PAIN CANNOT BE CONTROLLED.  YOU MAY FEEL DROWSY AND WEAK FOR A DAY OR TWO.    VAGINAL DISCHARGE  YOU MAY HAVE SOME BLEEDING OR DISCHARGE FOR UP TO TWO WEEKS.  DO NOT DOUCHE, USE TAMPONS OR HAVE SEX (INTERCOURSE) IN THE FIRST WEEK.  CALL YOUR DOCTOR IF YOU SOAK MORE THAN ONE MAXI PAD (SANITARY NAPKIN) PER HOUR, OR IF YOU PASS LARGE BLOOD CLOTS.    OTHER SYMPTOMS  YOU MAY HAVE A LOW FEVER FOR THE FIRST TWO DAYS.  CALL YOUR DOCTOR IF YOUR FEVER GOES OVER 101 DEGREES FAHRENHEIT.    IF YOU HAVE NAUSEA (FEEL SICK TO YOUR STOMACH), STAY IN BED.  TRY DRINKING A SMALL AMOUNT 7-UP, TEA OR SOUP.    DIET AND ACTIVITY  EAT LIGHT MEALS AND DRINK PLENTY OF FLUIDS FOR THE FIRST 24 HOURS (OR LONGER, IF YOU HAVE NAUSEA).    YOU MAY BATHE, SHOWER AND CLIMB STAIRS.  MOST WOMEN CAN RETURN TO WORK AFTER 24 HOURS.  YOU MAY GO BACK TO YOUR OTHER ACTIVITIES AFTER YOUR PAIN GOES AWAY.          GENERAL ANESTHESIA OR SEDATION ADULT DISCHARGE INSTRUCTIONS   SPECIAL PRECAUTIONS FOR 24 HOURS AFTER SURGERY    IT IS NOT UNUSUAL TO FEEL LIGHT-HEADED OR FAINT, UP TO 24 HOURS AFTER SURGERY OR WHILE TAKING PAIN MEDICATION.  IF YOU HAVE THESE SYMPTOMS; SIT FOR A FEW MINUTES BEFORE STANDING AND HAVE SOMEONE ASSIST YOU WHEN YOU GET UP TO WALK OR USE THE BATHROOM.    YOU SHOULD REST AND RELAX FOR THE NEXT 24 HOURS AND YOU MUST MAKE ARRANGEMENTS TO HAVE SOMEONE STAY WITH YOU FOR AT LEAST 24 HOURS AFTER YOUR DISCHARGE.  AVOID HAZARDOUS AND STRENUOUS ACTIVITIES.  DO NOT MAKE IMPORTANT DECISIONS FOR 24 HOURS.    DO NOT DRIVE ANY  "VEHICLE OR OPERATE MECHANICAL EQUIPMENT FOR 24 HOURS FOLLOWING THE END OF YOUR SURGERY.  EVEN THOUGH YOU MAY FEEL NORMAL, YOUR REACTIONS MAY BE AFFECTED BY THE MEDICATION YOU HAVE RECEIVED.    DO NOT DRINK ALCOHOLIC BEVERAGES FOR 24 HOURS FOLLOWING YOUR SURGERY.    DRINK CLEAR LIQUIDS (APPLE JUICE, GINGER ALE, 7-UP, BROTH, ETC.).  PROGRESS TO YOUR REGULAR DIET AS YOU FEEL ABLE.    YOU MAY HAVE A DRY MOUTH, A SORE THROAT, MUSCLES ACHES OR TROUBLE SLEEPING.  THESE SHOULD GO AWAY AFTER 24 HOURS.    CALL YOUR DOCTOR FOR ANY OF THE FOLLOWING:  SIGNS OF INFECTION (FEVER, GROWING TENDERNESS AT THE SURGERY SITE, A LARGE AMOUNT OF DRAINAGE OR BLEEDING, SEVERE PAIN, FOUL-SMELLING DRAINAGE, REDNESS OR SWELLING.    IT HAS BEEN OVER 8 TO 10 HOURS SINCE SURGERY AND YOU ARE STILL NOT ABLE TO URINATE (PASS WATER).         MEDICATIONS YOU RECEIVED:  *You received Toradol, an IV form of ibuprofen (Motrin) at 11:50 AM.  Do not take any ibuprofen products until 5:50 PM.  *Maximum acetaminophen (Tylenol) dose from all sources should not exceed 4 grams (4000 mg) per day.    Pending Results     No orders found from 3/14/2018 to 3/17/2018.            Admission Information     Date & Time Provider Department Dept. Phone    3/16/2018 Danilo White MD [facility] PreOP/PostOP [phone]      Your Vitals Were     Blood Pressure Pulse Temperature Respirations Height Weight    109/70 65 97  F (36.1  C) (Temporal) 15 1.626 m (5' 4\") 73 kg (161 lb)    Last Period Pulse Oximetry BMI (Body Mass Index)             02/18/2018 (Exact Date) 100% 27.64 kg/m2         [employer] Information     [employer] lets you send messages to your doctor, view your test results, renew your prescriptions, schedule appointments and more. To sign up, go to [email or web address] . Click on \"Log in\" on the left side of the screen, which will take you to the Welcome page. Then click on \"Sign up Now\" on the right side of the page.     You will be asked to enter the " access code listed below, as well as some personal information. Please follow the directions to create your username and password.     Your access code is: [email or web address]  Expires: 2018 12:45 PM     Your access code will  in 90 days. If you need help or a new code, please call your [place] clinic or [phone].        Care EveryWhere ID     This is your Care EveryWhere ID. This could be used by other organizations to access your [place] medical records  [id number]        Equal Access to Services     [facility]: Hadcherise khan Sotelma, waarabella luqadaha, qaybtheron kaalmasimran sherwood, demetri zuniga . So [facility] [phone].    ATENCIÓN: Si habla español, tiene a paige disposición servicios gratuitos de asistencia lingüística. Llame al [phone].    We comply with applicable federal civil rights laws and [place] laws. We do not discriminate on the basis of race, color, national origin, age, disability, sex, sexual orientation, or gender identity.               Review of your medicines      START taking        Dose / Directions    HYDROcodone-acetaminophen 5-325 MG per tablet   Commonly known as:  NORCO   Used for:  Postoperative abdominal pain        Dose:  1-2 tablet   Take 1-2 tablets by mouth every 4 hours as needed for other (Moderate to Severe Pain)   Quantity:  30 tablet   Refills:  0       ondansetron 4 MG ODT tab   Commonly known as:  ZOFRAN-ODT   Used for:  Postoperative abdominal pain        Dose:  4-8 mg   Take 1-2 tablets (4-8 mg) by mouth every 8 hours as needed for nausea Dissolve ON the tongue.   Quantity:  4 tablet   Refills:  0         CONTINUE these medicines which have NOT CHANGED        Dose / Directions    HYDROXYZINE HCL PO        Dose:  20 mg   Take 20 mg by mouth every 4 hours as needed For hives   Refills:  0       IBUPROFEN PO        Dose:  400 mg   Take 400 mg by mouth every 6 hours as needed   Refills:  0       MULTIVITAMIN PO        Refills:  0        traZODone 50 MG tablet   Commonly known as:  DESYREL   Used for:  Insomnia, unspecified type        Dose:  50 mg   Take 1 tablet (50 mg) by mouth nightly as needed for sleep   Quantity:  30 tablet   Refills:  1            Where to get your medicines      Some of these will need a paper prescription and others can be bought over the counter. Ask your nurse if you have questions.     Bring a paper prescription for each of these medications     HYDROcodone-acetaminophen 5-325 MG per tablet    ondansetron 4 MG ODT tab                Protect others around you: Learn how to safely use, store and throw away your medicines at www.disposemymeds.org.        Information about OPIOIDS     PRESCRIPTION OPIOIDS: WHAT YOU NEED TO KNOW    Prescription opioids can be used to help relieve moderate to severe pain and are often prescribed following a surgery or injury, or for certain health conditions. These medications can be an important part of treatment but also come with serious risks. It is important to work with your health care provider to make sure you are getting the safest, most effective care.    WHAT ARE THE RISKS AND SIDE EFFECTS OF OPIOID USE?  Prescription opioids carry serious risks of addiction and overdose, especially with prolonged use. An opioid overdose, often marked by slowed breathing can cause sudden death. The use of prescription opioids can have a number of side effects as well, even when taken as directed:      Tolerance - meaning you might need to take more of a medication for the same pain relief    Physical dependence - meaning you have symptoms of withdrawal when a medication is stopped    Increased sensitivity to pain    Constipation    Nausea, vomiting, and dry mouth    Sleepiness and dizziness    Confusion    Depression    Low levels of testosterone that can result in lower sex drive, energy, and strength    Itching and sweating    RISKS ARE GREATER WITH:    History of drug misuse, substance use  disorder, or overdose    Mental health conditions (such as depression or anxiety)    Sleep apnea    Older age (65 years or older)    Pregnancy    Avoid alcohol while taking prescription opioids.   Also, unless specifically advised by your health care provider, medications to avoid include:    Benzodiazepines (such as Xanax or Valium)    Muscle relaxants (such as Soma or Flexeril)    Hypnotics (such as Ambien or Lunesta)    Other prescription opioids    KNOW YOUR OPTIONS:  Talk to your health care provider about ways to manage your pain that do not involve prescription opioids. Some of these options may actually work better and have fewer risks and side effects:    Pain relievers such as acetaminophen, ibuprofen, and naproxen    Some medications that are also used for depression or seizures    Physical therapy and exercise    Cognitive behavioral therapy, a psychological, goal-directed approach, in which patients learn how to modify physical, behavioral, and emotional triggers of pain and stress    IF YOU ARE PRESCRIBED OPIOIDS FOR PAIN:    Never take opioids in greater amounts or more often than prescribed    Follow up with your primary health care provider and work together to create a plan on how to manage your pain.    Talk about ways to help manage your pain that do not involve prescription opioids    Talk about all concerns and side effects    Help prevent misuse and abuse    Never sell or share prescription opioids    Never use another person's prescription opioids    Store prescription opioids in a secure place and out of reach of others (this may include visitors, children, friends, and family)    Visit www.cdc.gov/drugoverdose to learn about risks of opioid abuse and overdose    If you believe you may be struggling with addiction, tell your health care provider and ask for guidance or call [facility]'s National Helpline at 9-124-939-HELP    LEARN MORE /  www.cdc.gov/drugoverdose/prescribing/guideline.html    Safely dispose of unused prescription opioids: Find your local drug take-back programs and more information about the importance of safe disposal at www.doseofreality.mn.gov             Medication List: This is a list of all your medications and when to take them. Check marks below indicate your daily home schedule. Keep this list as a reference.      Medications           Morning Afternoon Evening Bedtime As Needed    HYDROcodone-acetaminophen 5-325 MG per tablet   Commonly known as:  NORCO   Take 1-2 tablets by mouth every 4 hours as needed for other (Moderate to Severe Pain)                                HYDROXYZINE HCL PO   Take 20 mg by mouth every 4 hours as needed For hives                                IBUPROFEN PO   Take 400 mg by mouth every 6 hours as needed                                MULTIVITAMIN PO                                ondansetron 4 MG ODT tab   Commonly known as:  ZOFRAN-ODT   Take 1-2 tablets (4-8 mg) by mouth every 8 hours as needed for nausea Dissolve ON the tongue.                                traZODone 50 MG tablet   Commonly known as:  DESYREL   Take 1 tablet (50 mg) by mouth nightly as needed for sleep

## 2018-03-16 NOTE — OR NURSING
Dr. Barreto, [facility], notified that the patient continues to have complaints of feeling nauseated. Orders received to administer 25 mg of phenergan and 25 mg of ephedrine IM. Patient declines this intervention at this time.

## 2018-03-16 NOTE — IP AVS SNAPSHOT
[facility] PreOP/PostOP    [address]    [facility] [address]    Phone:  [phone]    Fax:  [phone]                                       After Visit Summary   3/16/2018    Felicia Bermudez    MRN: [id number]           After Visit Summary Signature Page     I have received my discharge instructions, and my questions have been answered. I have discussed any challenges I see with this plan with the nurse or doctor.    ..........................................................................................................................................  Patient/Patient Representative Signature      ..........................................................................................................................................  Patient Representative Print Name and Relationship to Patient    ..................................................               ................................................  Date                                            Time    ..........................................................................................................................................  Reviewed by Signature/Title    ...................................................              ..............................................  Date                                                            Time

## 2018-03-16 NOTE — OP NOTE
[facility] Gynecology Brief Operative Note    Pre-operative diagnosis: Elective Sterilization, Menorrhagia    Post-operative diagnosis: Same   Procedure: Dilation and curettage  Hysteroscopy  Laparoscopy with tubal ligaion     -unable to perform Novasure secondary to uterine perforation   Surgeon: Danilo White MD   Assistant(s): None   Anesthesia: General Endotracheal Anesthesia   Estimated blood loss: less than 50ml   Total IV fluids: (See anesthesia record)   Blood transfusion: No transfusion was given during surgery   Total urine output: (See anesthesia record)   Drains: None   Specimens: endometrium   Findings: See dictated note   Complications: Uterine perforation   Condition: Stable   Comments: See dictated operative report for full details

## 2018-03-16 NOTE — ANESTHESIA POSTPROCEDURE EVALUATION
Patient: Felicia Bermudez    Procedure(s):  Hysteroscopy, Dilation and Curettage, unable to perform Endometrial Ablation with Novasure secondary to uterine perforation, diagnostic laparoscopy, Bilateral Laparoscopic Tubal Ligation  - Wound Class: II-Clean Contaminated   - Wound Class: II-Clean Contaminated    Diagnosis:Elective Sterilization, Menorrhagia   Diagnosis Additional Information: Pre-operative diagnosis: Elective Sterilization, Menorrhagia   Post-operative diagnosis: Same  Procedure: Dilation and curettage  Hysteroscopy  Laparoscopy with tubal ligaion     -unable to perform Novasure secondary to uterine perforation        Anesthesia Type:  General, ETT    Note:  Anesthesia Post Evaluation    Patient location during evaluation: PACU  Patient participation: Able to fully participate in evaluation  Level of consciousness: awake  Pain management: adequate  Airway patency: patent  Cardiovascular status: acceptable  Respiratory status: acceptable  Hydration status: acceptable  PONV: controlled     Anesthetic complications: None          Last vitals:  Vitals:    03/16/18 1430 03/16/18 1435 03/16/18 1445   BP: 124/70 129/66 107/85   Pulse:      Resp: 13 14 10   Temp:  97  F (36.1  C)    SpO2: 100% 100% 99%         Electronically Signed By: Claus Lopez MD  March 16, 2018  3:12 PM

## 2018-03-16 NOTE — OR NURSING
Dr. Lopez, [facility], notified of post-operative shivering. Orders received to administer Demerol 25 mg IV. Will administer and continue to monitor.

## 2018-03-17 NOTE — OR NURSING
"During post op phone call, c/o sharp intermittent abdominal pain.  Inquired if abdomen is soft to touch, states it is soft to the touch.  \"it feels like gas pain\"  Educated that with laparoscopic procedure, gas is placed and can become trapped giving sharp gas like pain.  Educated to ambulate to help gas move so it can be expelled.  Verbalized understanding.  Instructed that if abdomen becomes firm, to call Dr. White's office.  See comments in chart re: voiding.  "

## 2018-03-17 NOTE — OP NOTE
Procedure Date: 2018      PREOPERATIVE DIAGNOSIS:  Menorrhagia, multiparity, desire for surgical contraception.      POSTOPERATIVE DIAGNOSIS:  Menorrhagia, multiparity, desire for surgical contraception.      PROCEDURE:  D and C, hysteroscopy, NovaSure endometrial ablation procedure abandoned, diagnostic laparoscopy with fulguration of fallopian tubes bilaterally.      SURGEON:  Danilo White MD      ASSISTANTS:  None.      ANESTHESIA:  General per endotracheal tube.      COMPLICATIONS:  Uterine perforation at the time of endometrial ablation attempt.      ESTIMATED BLOOD LOSS:  Less than 50 mL.      NARRATIVE SUMMARY:  The patient is a 42-year-old  2, para 2 patient who presents to the operating suite for evaluation and therapy for abnormal uterine bleeding as well as surgical contraception.  Risks, benefits, alternatives have been reviewed and discussed.  They include but are not limited to infection, bleeding, damage to bowel, bladder or any intraabdominal viscera, as well as all risks attended to anesthesia and blood transfusion.  Additional risks include the potential inability to perform a NovaSure endometrial ablation based on uterine size.      DESCRIPTION OF PROCEDURE:  With consent, she was taken to the operating suite, where the perineum and the abdomen were prepped and draped in sterile fashion.  The cervix was visualized with a single-tooth tenaculum, dilated to a #8 Hegar dilator and then a 5 mm hysteroscope with saline for distending medium was used to evaluate the endometrial cavity.  The internal ostia on right and left side are seen.  There were no submucous fibroids or polyps identified.  This was carried out after first sounding the uterus to 8 cm.  The hysteroscope was withdrawn.  The endometrium is curetted with curetting submitted for histopathologic evaluation and the NovaSure device is placed.  Initial dimensions include a sounding length of 8, a cervical length of 4 and a  uterine width of 3.5 cm.  The cavity test was attempted; however, failed.  The NovaSure was withdrawn and inspected, reinserted after grasping the posterior lip of the cervix as well as the anterior with a tenaculum.  Again, the cavity test was attempted and failed.  The NovaSure device is withdrawn and Vaseline gauze was placed around the obturator, but when re-placed the cavity width was now noted to be 4.5 cm.  It was therefore withdrawn and the hysteroscope used to inspect the uterine cavity, and a perforation was found.  The NovaSure endometrial ablation was therefore abandoned.        The bladder was drained and attention was turned to the abdomen where a 5 mm subumbilical skin incision was made.  Veress needle was placed on the first attempt with hanging drop saline technique, however, pressure remained above 12 mm and insufflation was stopped.  A second attempt was made; however, again high pressures were encountered.  The laparoscope was then attempted to be introduced under direct laparoscopic visualization with an Optiview type port and entry was unable secondary to apparent adhesions from the omentum.  The laparoscope was withdrawn and a left upper quadrant port is made and with direct laparoscopic visualization, the laparoscope is introduced.  The uterus, fallopian tubes and ovaries were inspected.  There was a defect secondary to the uterine perforation in the right fundus of the uterus.  It was not bleeding and no other injuries to bowel or bladder or other structures were noted laparoscopically.  The fallopian tubes and ovaries appeared grossly normal.  Per patient request, 2 cm of both right and left tubes were fulgurated with Gyrus cutting forceps.        At the time the perforation was noted, the patient was begun on antibiotic therapy and the pelvis was washed with multiple irrigation with normal saline.  At this juncture, the gas was allowed to escape and all instruments were removed from the  abdomen.  The incisions were closed with 4-0 Monocryl and Dermabond.  The patient has been apprised of the operative findings and the uterine perforation prior to release and is given specific instructions to call for temperature greater than 100.5, increasing abdominal or perineal pain, or heavy vaginal bleeding.  Prescriptions include Norco to use as needed and Zofran.  She will return in 1-2 weeks or sooner for the symptoms as noted above.         CESAR DONALDSON MD             D: 2018   T: 2018   MT: REJI      Name:     JESENIA CHISHOLM   MRN:      [id number]        Account:        [id number]   :      1975           Procedure Date: 2018      Document: [id number]

## 2018-03-30 ENCOUNTER — OFFICE VISIT (OUTPATIENT)
Dept: OBGYN | Facility: CLINIC | Age: 43
End: 2018-03-30
Payer: COMMERCIAL

## 2018-03-30 ENCOUNTER — TELEPHONE (OUTPATIENT)
Dept: OBGYN | Facility: CLINIC | Age: 43
End: 2018-03-30

## 2018-03-30 VITALS
HEART RATE: 80 BPM | BODY MASS INDEX: 27.64 KG/M2 | WEIGHT: 161 LBS | DIASTOLIC BLOOD PRESSURE: 72 MMHG | SYSTOLIC BLOOD PRESSURE: 112 MMHG

## 2018-03-30 DIAGNOSIS — Z98.890 POSTOPERATIVE STATE: Primary | ICD-10-CM

## 2018-03-30 DIAGNOSIS — R30.0 DYSURIA: ICD-10-CM

## 2018-03-30 DIAGNOSIS — N92.0 MENORRHAGIA WITH REGULAR CYCLE: Primary | ICD-10-CM

## 2018-03-30 LAB
ALBUMIN UR-MCNC: NEGATIVE MG/DL
APPEARANCE UR: CLEAR
BILIRUB UR QL STRIP: NEGATIVE
COLOR UR AUTO: YELLOW
GLUCOSE UR STRIP-MCNC: NEGATIVE MG/DL
HGB UR QL STRIP: NEGATIVE
KETONES UR STRIP-MCNC: NEGATIVE MG/DL
LEUKOCYTE ESTERASE UR QL STRIP: NEGATIVE
NITRATE UR QL: NEGATIVE
PH UR STRIP: 5.5 PH (ref 5–7)
SOURCE: NORMAL
SP GR UR STRIP: <=1.005 (ref 1–1.03)
UROBILINOGEN UR STRIP-ACNC: 0.2 EU/DL (ref 0.2–1)

## 2018-03-30 PROCEDURE — 99024 POSTOP FOLLOW-UP VISIT: CPT | Performed by: OBSTETRICS & GYNECOLOGY

## 2018-03-30 PROCEDURE — 81003 URINALYSIS AUTO W/O SCOPE: CPT | Performed by: OBSTETRICS & GYNECOLOGY

## 2018-03-30 RX ORDER — PERPHENAZINE/AMITRIPTYLINE HCL 2 MG-10 MG
1 TABLET ORAL DAILY
Qty: 100 TABLET | Refills: 0 | Status: SHIPPED | OUTPATIENT
Start: 2018-03-30 | End: 2018-04-09

## 2018-03-30 NOTE — TELEPHONE ENCOUNTER
Patient calling asking for a probiotic RX be sent to her pharmacy.    Neville Zavala  [address]    Sagar Greene CMA

## 2018-03-30 NOTE — NURSING NOTE
"Chief Complaint   Patient presents with     Surgical Followup     03/16/18 D&C - cramping, bloating, and back pain       Initial /72 (BP Location: Right arm, Patient Position: Chair, Cuff Size: Adult Regular)  Pulse 80  Wt 161 lb (73 kg)  BMI 27.64 kg/m2 Estimated body mass index is 27.64 kg/(m^2) as calculated from the following:    Height as of 3/16/18: 5' 4\" (1.626 m).    Weight as of this encounter: 161 lb (73 kg).  Medication Reconciliation: complete     Nurse assisted visit.  Judith Bains MA.      "

## 2018-03-30 NOTE — MR AVS SNAPSHOT
"              After Visit Summary   3/30/2018    Felicia Bermudez    MRN: [id number]           Patient Information     Date Of Birth          1975        Visit Information        Provider Department      3/30/2018 10:45 AM Danilo White MD [facility]        Today's Diagnoses     Postoperative state    -  1    Dysuria           Follow-ups after your visit        Who to contact     If you have questions or need follow up information about today's clinic visit or your schedule please contact [facility] directly at [phone].  Normal or non-critical lab and imaging results will be communicated to you by MyChart, letter or phone within 4 business days after the clinic has received the results. If you do not hear from us within 7 days, please contact the clinic through [employer] or phone. If you have a critical or abnormal lab result, we will notify you by phone as soon as possible.  Submit refill requests through [facility] or call your pharmacy and they will forward the refill request to us. Please allow 3 business days for your refill to be completed.          Additional Information About Your Visit        MyChart Information     [facility] lets you send messages to your doctor, view your test results, renew your prescriptions, schedule appointments and more. To sign up, go to [email or web address] . Click on \"Log in\" on the left side of the screen, which will take you to the Welcome page. Then click on \"Sign up Now\" on the right side of the page.     You will be asked to enter the access code listed below, as well as some personal information. Please follow the directions to create your username and password.     Your access code is: [email or web address]  Expires: 2018 12:45 PM     Your access code will  in 90 days. If you need help or a new code, please call your [facility] or [phone].        Care EveryWhere ID     This is your Care EveryWhere ID. This could be used " by other organizations to access your [place] medical records  [id number]        Your Vitals Were     Pulse BMI (Body Mass Index)                80 27.64 kg/m2           Blood Pressure from Last 3 Encounters:   03/30/18 112/72   03/16/18 114/75   02/23/18 104/68    Weight from Last 3 Encounters:   03/30/18 161 lb (73 kg)   03/16/18 161 lb (73 kg)   02/23/18 160 lb (72.6 kg)              We Performed the Following     *UA reflex to Microscopic and Culture ([place] and [facility] (except [place] and [place])          Today's Medication Changes          These changes are accurate as of 3/30/18  2:50 PM.  If you have any questions, ask your nurse or doctor.               Start taking these medicines.        Dose/Directions    CRANBERRY/PROBIOTIC Tabs   Used for:  Menorrhagia with regular cycle   Started by:  Danilo White MD        Dose:  1 tablet   Take 1 tablet by mouth daily   Quantity:  100 tablet   Refills:  0            Where to get your medicines      These medications were sent to [facility] [address] [place] [address] [address] AT [place] [address] & [address]  [address] [address] [place], [facility] [address]     Phone:  [phone]     CRANBERRY/PROBIOTIC Tabs                Primary Care Provider Office Phone # Fax #    Jose Daniel Arredondo [phone] [address] [place] [address]        Equal Access to Services     [facility]: Hadii derek khan Sotelma, waaxda luqadaha, qaybta kaalmada presley, demetri graham. So [facility] [phone].    ATENCIÓN: Si habla español, tiene a paige disposición servicios gratuitos de asistencia lingüística. Llame al [phone].    We comply with applicable federal civil rights laws and [place] laws. We do not discriminate on the basis of race, color, national origin, age, disability, sex, sexual orientation, or gender identity.            Thank you!     Thank you for choosing  [facility]  for your care. Our goal is always to provide you with excellent care. Hearing back from our patients is one way we can continue to improve our services. Please take a few minutes to complete the written survey that you may receive in the mail after your visit with us. Thank you!             Your Updated Medication List - Protect others around you: Learn how to safely use, store and throw away your medicines at www.disposemymeds.org.          This list is accurate as of 3/30/18  2:50 PM.  Always use your most recent med list.                   Brand Name Dispense Instructions for use Diagnosis    CRANBERRY/PROBIOTIC Tabs     100 tablet    Take 1 tablet by mouth daily    Menorrhagia with regular cycle       HYDROXYZINE HCL PO      Take 20 mg by mouth every 4 hours as needed For hives        IBUPROFEN PO      Take 400 mg by mouth every 6 hours as needed        MULTIVITAMIN PO           ondansetron 4 MG ODT tab    ZOFRAN-ODT    4 tablet    Take 1-2 tablets (4-8 mg) by mouth every 8 hours as needed for nausea Dissolve ON the tongue.    Postoperative abdominal pain       traZODone 50 MG tablet    DESYREL    30 tablet    Take 1 tablet (50 mg) by mouth nightly as needed for sleep    Insomnia, unspecified type

## 2018-04-03 ENCOUNTER — RECORDS - HEALTHEAST (OUTPATIENT)
Dept: LAB | Facility: CLINIC | Age: 43
End: 2018-04-03

## 2018-04-05 DIAGNOSIS — G47.00 INSOMNIA, UNSPECIFIED TYPE: ICD-10-CM

## 2018-04-05 NOTE — TELEPHONE ENCOUNTER
"Requested Prescriptions   Pending Prescriptions Disp Refills     traZODone (DESYREL) 50 MG tablet [Pharmacy Med Name: TRAZODONE 50MG TABLETS]    Last Written Prescription Date:  1/19/2018  Last Fill Quantity: 30,  # refills: 1   Last office visit: 2/23/2018 with prescribing provider:  Jose Daniel Arredondo     Future Office Visit:     30 tablet 0     Sig: TAKE 1 TABLET BY MOUTH EVERY NIGHT AT BEDTIME AS NEEDED FOR SLEEP    Serotonin Modulators Passed    4/5/2018  3:14 AM       Passed - Recent (12 mo) or future (30 days) visit within the authorizing provider's specialty    Patient had office visit in the last 12 months or has a visit in the next 30 days with authorizing provider or within the authorizing provider's specialty.  See \"Patient Info\" tab in inbasket, or \"Choose Columns\" in Meds & Orders section of the refill encounter.           Passed - Patient is age 18 or older       Passed - No active pregnancy on record       Passed - No positive pregnancy test in past 12 months          "

## 2018-04-06 LAB
QTF INTERPRETATION: ABNORMAL
QTF MITOGEN - NIL: >10 IU/ML
QTF NIL: 0.04 IU/ML
QTF RESULT: POSITIVE
QTF TB ANTIGEN - NIL: 3.33 IU/ML

## 2018-04-09 ENCOUNTER — TELEPHONE (OUTPATIENT)
Dept: OBGYN | Facility: CLINIC | Age: 43
End: 2018-04-09

## 2018-04-09 DIAGNOSIS — M54.50 LOWER BACK PAIN: ICD-10-CM

## 2018-04-09 DIAGNOSIS — Z98.890 POST-OPERATIVE STATE: Primary | ICD-10-CM

## 2018-04-09 DIAGNOSIS — R10.2 PELVIC PAIN IN FEMALE: ICD-10-CM

## 2018-04-09 RX ORDER — TRAZODONE HYDROCHLORIDE 50 MG/1
TABLET, FILM COATED ORAL
Qty: 30 TABLET | Refills: 3 | Status: SHIPPED | OUTPATIENT
Start: 2018-04-09 | End: 2018-04-12

## 2018-04-09 RX ORDER — PERPHENAZINE/AMITRIPTYLINE HCL 2 MG-10 MG
1 TABLET ORAL DAILY
Qty: 100 TABLET | Refills: 0 | Status: SHIPPED | OUTPATIENT
Start: 2018-04-09 | End: 2018-04-12

## 2018-04-09 NOTE — TELEPHONE ENCOUNTER
No fever.     Pt will call for ct scan schedule.  I scheduled her with Dr White on Wed.    Alisson MARTINS R.N.  [facility]

## 2018-04-09 NOTE — TELEPHONE ENCOUNTER
Pt had COMBINED DILATION AND CURETTAGE, HYSTEROSCOPY, ABLATE ENDOMETRIUM NOVASURE on 3/16/18.    Pt calls today stating that she is still having back pain, lower back both sides.  Pain rated at 7/10, throbbing pain.  Consistent, since surgery.  Ibuprofen helps, taking about 2-3 two or three times daily.    Pain on right side of stomach.  Sharp pain since surgery, worse with walking.    Advised to continue taking ibuprofen and that I would see what Dr White wants to do moving forward.    Do you want pt to come in?    Alisson MARTINS R.N.  [facility]

## 2018-04-09 NOTE — TELEPHONE ENCOUNTER
Please advise and assist     Confirm not having fever     Recommend CT abdomen and pelvis with contrast     Re: back and pelvic pain since surgery     Recommend having within next 24 - 48 hours     Office visit after CT

## 2018-04-10 ENCOUNTER — HOSPITAL ENCOUNTER (OUTPATIENT)
Dept: CT IMAGING | Facility: CLINIC | Age: 43
Discharge: HOME | End: 2018-04-10
Attending: OBSTETRICS & GYNECOLOGY | Admitting: OBSTETRICS & GYNECOLOGY
Payer: COMMERCIAL

## 2018-04-10 DIAGNOSIS — Z98.890 POST-OPERATIVE STATE: ICD-10-CM

## 2018-04-10 DIAGNOSIS — M54.50 LOWER BACK PAIN: ICD-10-CM

## 2018-04-10 DIAGNOSIS — R10.2 PELVIC PAIN IN FEMALE: ICD-10-CM

## 2018-04-10 PROCEDURE — 74176 CT ABD & PELVIS W/O CONTRAST: CPT

## 2018-04-10 NOTE — TELEPHONE ENCOUNTER
Pt allergic to iodine, the imaging dept scheduled her and told her to call MD and ask for medication 12 hours prior and two hours after.  They told her MD would figure out what medication.    Otherwise we can do without contrast.  Let me know your thoughts.    Alisson MARTINS R.N.  [facility]

## 2018-04-11 ENCOUNTER — OFFICE VISIT (OUTPATIENT)
Dept: OBGYN | Facility: CLINIC | Age: 43
End: 2018-04-11
Payer: COMMERCIAL

## 2018-04-11 VITALS
BODY MASS INDEX: 26.95 KG/M2 | SYSTOLIC BLOOD PRESSURE: 112 MMHG | DIASTOLIC BLOOD PRESSURE: 70 MMHG | WEIGHT: 157 LBS | HEART RATE: 82 BPM | TEMPERATURE: 98 F

## 2018-04-11 DIAGNOSIS — Z98.890 POSTOPERATIVE STATE: Primary | ICD-10-CM

## 2018-04-11 PROCEDURE — 99213 OFFICE O/P EST LOW 20 MIN: CPT | Performed by: OBSTETRICS & GYNECOLOGY

## 2018-04-11 NOTE — MR AVS SNAPSHOT
"              After Visit Summary   4/11/2018    Felicia Bermudez    MRN: [id number]           Patient Information     Date Of Birth          1975        Visit Information        Provider Department      4/11/2018 3:30 PM Danilo White MD [facility]        Today's Diagnoses     Postoperative state    -  1       Follow-ups after your visit        Your next 10 appointments already scheduled     Apr 12, 2018  8:40 AM CDT   PHYSICAL with Reina Mason MD   [facility]    [address]  [address]  [facility] [address] [phone]              Who to contact     If you have questions or need follow up information about today's clinic visit or your schedule please contact [facility] directly at [phone].  Normal or non-critical lab and imaging results will be communicated to you by MyChart, letter or phone within 4 business days after the clinic has received the results. If you do not hear from us within 7 days, please contact the clinic through MyChart or phone. If you have a critical or abnormal lab result, we will notify you by phone as soon as possible.  Submit refill requests through [employer] or call your pharmacy and they will forward the refill request to us. Please allow 3 business days for your refill to be completed.          Additional Information About Your Visit        MyChart Information     [employer] lets you send messages to your doctor, view your test results, renew your prescriptions, schedule appointments and more. To sign up, go to [email or web address] . Click on \"Log in\" on the left side of the screen, which will take you to the Welcome page. Then click on \"Sign up Now\" on the right side of the page.     You will be asked to enter the access code listed below, as well as some personal information. Please follow the directions to create your username and password.     Your access code is: " [email or web address]  Expires: 2018 12:45 PM     Your access code will  in 90 days. If you need help or a new code, please call your [place] clinic or [phone].        Care EveryWhere ID     This is your Care EveryWhere ID. This could be used by other organizations to access your [place] medical records  [id number]        Your Vitals Were     Pulse Temperature Breastfeeding? BMI (Body Mass Index)          82 98  F (36.7  C) (Oral) No 26.95 kg/m2         Blood Pressure from Last 3 Encounters:   18 112/70   18 112/72   18 114/75    Weight from Last 3 Encounters:   18 157 lb (71.2 kg)   18 161 lb (73 kg)   18 161 lb (73 kg)              Today, you had the following     No orders found for display       Primary Care Provider Office Phone # Fax #    Jose Daniel Arredondo [phone] [address] [place] [address]        Equal Access to Services     [facility]: Hadii aad ku hadasho Soomaali, waaxda luqadaha, qaybta kaalmada adeegyasimran, demetri zuniga . So [facility] [phone].    ATENCIÓN: Si habla español, tiene a paige disposición servicios gratuitos de asistencia lingüística. Llame al [phone].    We comply with applicable federal civil rights laws and [place] laws. We do not discriminate on the basis of race, color, national origin, age, disability, sex, sexual orientation, or gender identity.            Thank you!     Thank you for choosing [facility]  for your care. Our goal is always to provide you with excellent care. Hearing back from our patients is one way we can continue to improve our services. Please take a few minutes to complete the written survey that you may receive in the mail after your visit with us. Thank you!             Your Updated Medication List - Protect others around you: Learn how to safely use, store and throw away your medicines at www.disposemymeds.org.          This list is  accurate as of 4/11/18  4:32 PM.  Always use your most recent med list.                   Brand Name Dispense Instructions for use Diagnosis    CRANBERRY/PROBIOTIC Tabs     100 tablet    Take 1 tablet by mouth daily    Menorrhagia with regular cycle       HYDROXYZINE HCL PO      Take 20 mg by mouth every 4 hours as needed For hives        IBUPROFEN PO      Take 400 mg by mouth every 6 hours as needed        MULTIVITAMIN PO           ondansetron 4 MG ODT tab    ZOFRAN-ODT    4 tablet    Take 1-2 tablets (4-8 mg) by mouth every 8 hours as needed for nausea Dissolve ON the tongue.    Postoperative abdominal pain       traZODone 50 MG tablet    DESYREL    30 tablet    TAKE 1 TABLET BY MOUTH EVERY NIGHT AT BEDTIME AS NEEDED FOR SLEEP    Insomnia, unspecified type

## 2018-04-11 NOTE — NURSING NOTE
"Chief Complaint   Patient presents with     RECHECK     Follow up C/T scan- pain right abdomen- hurts to touch- throbbing/sharp        Initial /70 (BP Location: Right arm, Patient Position: Sitting, Cuff Size: Adult Regular)  Pulse 82  Temp 98  F (36.7  C) (Oral)  Wt 157 lb (71.2 kg)  Breastfeeding? No  BMI 26.95 kg/m2 Estimated body mass index is 26.95 kg/(m^2) as calculated from the following:    Height as of 3/16/18: 5' 4\" (1.626 m).    Weight as of this encounter: 157 lb (71.2 kg).  BP completed using cuff size: regular        The following HM Due: NONE      The following patient reported/Care Every where data was sent to:  P ABSTRACT QUALITY INITIATIVES [51027]    3  patient has appointment for today.  Sagar Greene CMA                 "

## 2018-04-11 NOTE — PROGRESS NOTES
Here for post surgical follow up     Underwent surgery for abnormal bleeding     Failed endometrial ablation secondary to uterine perforation (3/16/18); at time of operation        -performed laparoscopy to evaluate pelvis and uterus as well as tubal ligation        Uterine perforation in R fundus; not bleeding    Continued to have postop pain; become worse about one week ago     -associated with return of menses     -has also noted nausea, no vomiting     -no diarrhea but has had constipation         -usually two BM per day; now BM every other day       -no fever       U/A; (previously done) negative     CT; (yesterday); no abscess, hematoma or abnormal findings    O: incision intact       Tenderness noted at point in R lower abdomen; approximately          3 cm from midline and 3 cm above symphysis       No rebound         Note: laparoscopic ports at midline suprapubically and umbilicus    A; postoperative pain of uncertain etiology       -in view of increasing symptoms with menses; may relate to uterine perforation          -but unclear mechanistically       -possible disruption of colon function    P: trial of GI regimen       -Citracel/MOM/Zantac       -follow up in one week or sooner as needed

## 2018-04-12 ENCOUNTER — OFFICE VISIT (OUTPATIENT)
Dept: PEDIATRICS | Facility: CLINIC | Age: 43
End: 2018-04-12
Payer: COMMERCIAL

## 2018-04-12 ENCOUNTER — TELEPHONE (OUTPATIENT)
Dept: PEDIATRICS | Facility: CLINIC | Age: 43
End: 2018-04-12

## 2018-04-12 VITALS
BODY MASS INDEX: 27.39 KG/M2 | WEIGHT: 154.6 LBS | DIASTOLIC BLOOD PRESSURE: 64 MMHG | SYSTOLIC BLOOD PRESSURE: 96 MMHG | HEIGHT: 63 IN | TEMPERATURE: 97.7 F | HEART RATE: 68 BPM | OXYGEN SATURATION: 98 %

## 2018-04-12 DIAGNOSIS — G47.00 INSOMNIA, UNSPECIFIED TYPE: ICD-10-CM

## 2018-04-12 DIAGNOSIS — Z12.31 VISIT FOR SCREENING MAMMOGRAM: ICD-10-CM

## 2018-04-12 DIAGNOSIS — K59.09 OTHER CONSTIPATION: ICD-10-CM

## 2018-04-12 DIAGNOSIS — Z13.6 ENCOUNTER FOR SCREENING FOR CARDIOVASCULAR DISORDERS: ICD-10-CM

## 2018-04-12 DIAGNOSIS — Z00.01 ENCOUNTER FOR GENERAL ADULT MEDICAL EXAMINATION WITH ABNORMAL FINDINGS: Primary | ICD-10-CM

## 2018-04-12 DIAGNOSIS — Z13.1 SCREENING FOR DIABETES MELLITUS: ICD-10-CM

## 2018-04-12 LAB
CHOLEST SERPL-MCNC: 215 MG/DL
GLUCOSE SERPL-MCNC: 87 MG/DL (ref 70–99)
HDLC SERPL-MCNC: 39 MG/DL
LDLC SERPL CALC-MCNC: 154 MG/DL
NONHDLC SERPL-MCNC: 176 MG/DL
TRIGL SERPL-MCNC: 109 MG/DL

## 2018-04-12 PROCEDURE — 82947 ASSAY GLUCOSE BLOOD QUANT: CPT | Performed by: PEDIATRICS

## 2018-04-12 PROCEDURE — 99396 PREV VISIT EST AGE 40-64: CPT | Performed by: PEDIATRICS

## 2018-04-12 PROCEDURE — 99213 OFFICE O/P EST LOW 20 MIN: CPT | Mod: 25 | Performed by: PEDIATRICS

## 2018-04-12 PROCEDURE — 80061 LIPID PANEL: CPT | Performed by: PEDIATRICS

## 2018-04-12 PROCEDURE — 36415 COLL VENOUS BLD VENIPUNCTURE: CPT | Performed by: PEDIATRICS

## 2018-04-12 RX ORDER — ZALEPLON 10 MG/1
10 CAPSULE ORAL AT BEDTIME
Qty: 30 CAPSULE | Refills: 5 | Status: SHIPPED | OUTPATIENT
Start: 2018-04-12 | End: 2018-06-09

## 2018-04-12 ASSESSMENT — ENCOUNTER SYMPTOMS
MYALGIAS: 0
SHORTNESS OF BREATH: 0
PALPITATIONS: 0
ABDOMINAL PAIN: 1
HEARTBURN: 0
DYSURIA: 0
DIARRHEA: 0
ARTHRALGIAS: 0
NERVOUS/ANXIOUS: 0
PARESTHESIAS: 0
SORE THROAT: 0
FEVER: 0
WEAKNESS: 0
FREQUENCY: 1
HEADACHES: 0
CHILLS: 0
CONSTIPATION: 1
DIZZINESS: 0
NAUSEA: 0
EYE PAIN: 0
HEMATOCHEZIA: 0
JOINT SWELLING: 0
COUGH: 0
HEMATURIA: 0

## 2018-04-12 NOTE — PROGRESS NOTES
SUBJECTIVE:   CC: Felicia Bermudez is an 42 year old woman who presents for preventive health visit.     Physical   Annual:     Getting at least 3 servings of Calcium per day::  Yes    Bi-annual eye exam::  Yes    Dental care twice a year::  Yes    Sleep apnea or symptoms of sleep apnea::  None    Diet::  Regular (no restrictions)    Frequency of exercise::  2-3 days/week    Duration of exercise::  15-30 minutes    Taking medications regularly::  Yes    Medication side effects::  Not applicable    Additional concerns today::  YES                Has been thristy lately, drinking more water, and urinary frequency, wants sugar checked.  Started x 3 months. PGF with diabetes.     Insomnia - patient has been to sleep specialist in past and tried most medications.  Nothing seems to help.  She does NOT have ANGELA or RLS.  She most recently was trying hydroxyzine, but it gave her blurry vision.Trazodone actually made her feel more awake.  She was hoping to try Sonata, but it was denied by her insurance in the past.    She gets about 3-4 hours of sleep, trouble falling and staying asleep.  She does try to follow good sleep hygeine.    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  Has been following with gyn - actually had CT scan 4/11/18 for continued RLQ abd pain (Still present today) - scan normal.  Likely related to constipation - was told to try citricil for now.         Today's PHQ-2 Score:   PHQ-2 ( 1999 Pfizer) 1/13/2017   Q1: Little interest or pleasure in doing things 0   Q2: Feeling down, depressed or hopeless 0   PHQ-2 Score 0       Abuse: Current or Past(Physical, Sexual or Emotional)- No  Do you feel safe in your environment - Yes    Social History   Substance Use Topics     Smoking status: Never Smoker     Smokeless tobacco: Never Used     Alcohol use No     No flowsheet data found.    Reviewed orders with patient.  Reviewed health maintenance and updated  orders accordingly - Yes  BP Readings from Last 3 Encounters:   04/12/18 96/64   04/11/18 112/70   03/30/18 112/72    Wt Readings from Last 3 Encounters:   04/12/18 154 lb 9.6 oz (70.1 kg)   04/11/18 157 lb (71.2 kg)   03/30/18 161 lb (73 kg)                    Patient under age 50, mutual decision reflected in health maintenance.      Pertinent mammograms are reviewed under the imaging tab.  History of abnormal Pap smear: NO - age 30-65 PAP every 5 years with negative HPV co-testing recommended    Reviewed and updated as needed this visit by clinical staff         Reviewed and updated as needed this visit by Provider            Review of Systems   Constitutional: Negative for chills and fever.   HENT: Negative for congestion, ear pain, hearing loss and sore throat.    Eyes: Positive for visual disturbance. Negative for pain.   Respiratory: Negative for cough and shortness of breath.    Cardiovascular: Negative for chest pain, palpitations and peripheral edema.   Gastrointestinal: Positive for abdominal pain and constipation. Negative for diarrhea, heartburn, hematochezia and nausea.   Genitourinary: Positive for frequency. Negative for dysuria, genital sores, hematuria, pelvic pain, urgency, vaginal bleeding and vaginal discharge.   Musculoskeletal: Negative for arthralgias, joint swelling and myalgias.   Skin: Negative for rash.   Neurological: Negative for dizziness, weakness, headaches and paresthesias.   Psychiatric/Behavioral: Negative for mood changes. The patient is not nervous/anxious.           OBJECTIVE:   There were no vitals taken for this visit.  Physical Exam  GENERAL: healthy, alert and no distress  EYES: Eyes grossly normal to inspection, PERRL and conjunctivae and sclerae normal  HENT: ear canals and TM's normal, nose and mouth without ulcers or lesions  NECK: no adenopathy, no asymmetry, masses, or scars and thyroid normal to palpation  RESP: lungs clear to auscultation - no rales, rhonchi or  "wheezes  BREAST: normal without masses, tenderness or nipple discharge and no palpable axillary masses or adenopathy  CV: regular rate and rhythm, normal S1 S2, no S3 or S4, no murmur, click or rub, no peripheral edema and peripheral pulses strong  ABDOMEN: soft, nontender, no hepatosplenomegaly, no masses and bowel sounds normal  MS: no gross musculoskeletal defects noted, no edema  SKIN: no suspicious lesions or rashes  NEURO: Normal strength and tone, mentation intact and speech normal  PSYCH: mentation appears normal, affect normal/bright    ASSESSMENT/PLAN:   1. Encounter for general adult medical examination with abnormal findings    2. Insomnia, unspecified type  Has tried every other sleep medicine I can think of.  Has already been to sleep medicine - they don't have anything else to offer.  - zaleplon (SONATA) 10 MG capsule; Take 1 capsule (10 mg) by mouth At Bedtime  Dispense: 30 capsule; Refill: 5    3. Screening for diabetes mellitus  - Glucose    4. Encounter for screening for cardiovascular disorders  - Lipid panel reflex to direct LDL Fasting    5. Visit for screening mammogram  - MA Screening Digital Bilateral; Future    6. Other constipation  Agree with citricil per gynecology      COUNSELING:  Reviewed preventive health counseling, as reflected in patient instructions       reports that she has never smoked. She has never used smokeless tobacco.    Estimated body mass index is 26.95 kg/(m^2) as calculated from the following:    Height as of 3/16/18: 5' 4\" (1.626 m).    Weight as of 4/11/18: 157 lb (71.2 kg).   Weight management plan: Discussed healthy diet and exercise guidelines and patient will follow up in 12 months in clinic to re-evaluate.    Counseling Resources:  ATP IV Guidelines  Pooled Cohorts Equation Calculator  Breast Cancer Risk Calculator  FRAX Risk Assessment  ICSI Preventive Guidelines  Dietary Guidelines for Americans, 2010  USDA's MyPlate  ASA Prophylaxis  Lung CA " Screening    Reina Mason MD  [facility]

## 2018-04-12 NOTE — TELEPHONE ENCOUNTER
[facility] is calling for the  zaleplon (SONATA) 10 MG capsule.  Please resend Rx to the pharmacy.     Thank you.     Erin

## 2018-04-12 NOTE — PATIENT INSTRUCTIONS
Schedule mammogram at [facility] [phone]        Preventive Health Recommendations  Female Ages 40 to 49    Yearly exam:     See your health care provider every year in order to  1. Review health changes.   2. Discuss preventive care.    3. Review your medicines if your doctor prescribed any.      Get a Pap test every three years (unless you have an abnormal result and your provider advises testing more often).      If you get Pap tests with HPV test, you only need to test every 5 years, unless you have an abnormal result. You do not need a Pap test if your uterus was removed (hysterectomy) and you have not had cancer.      You should be tested each year for STDs (sexually transmitted diseases), if you're at risk.       Ask your doctor if you should have a mammogram.      Have a colonoscopy (test for colon cancer) if someone in your family has had colon cancer or polyps before age 50.       Have a cholesterol test every 5 years.       Have a diabetes test (fasting glucose) after age 45. If you are at risk for diabetes, you should have this test every 3 years.    Shots: Get a flu shot each year. Get a tetanus shot every 10 years.     Nutrition:     Eat at least 5 servings of fruits and vegetables each day.    Eat whole-grain bread, whole-wheat pasta and brown rice instead of white grains and rice.    Talk to your provider about Calcium and Vitamin D.     Lifestyle    Exercise at least 150 minutes a week (an average of 30 minutes a day, 5 days a week). This will help you control your weight and prevent disease.    Limit alcohol to one drink per day.    No smoking.     Wear sunscreen to prevent skin cancer.    See your dentist every six months for an exam and cleaning.

## 2018-04-12 NOTE — LETTER
[facility]  [address] [place] [address]                  [phone]   April 16, 2018    Felicia Bermudez  [address]   [facility] [address]      Dear Felicia,    Here is a summary of your recent test results:    Your blood sugar is normal.  You do not have diabetes.     Your cholesterol is elevated, however not high enough to need medication at this time.     Dietary Recommendations to lower LDL cholesterol   1.Consume a dietary pattern that emphasizes intake of vegetables, fruits, and whole grains; includes low-fat dairy products, poultry, fish, legumes, non-tropical vegetable oils and nuts; and limits intake of sweets, sugar-sweetened beverages, and red meats.   --Adapt this dietary pattern to appropriate calorie requirements, personal and cultural food preferences, and nutrition therapy for other medical conditions (including diabetes mellitus).   --Achieve this pattern by following plans such as the DASH dietary pattern, the USDA Food Pattern, or the AHA Diet.   2.Aim for a dietary pattern that achieves 5-6% of calories from saturated fat.   3.Reduce percent of calories from saturated fat.   4.Reduce percent of calories from trans fat.     Your test results are enclosed.      Please contact me if you have any questions.    Best regards,    Reina Mason MD  [facility]        Results for orders placed or performed in visit on 04/12/18   Lipid panel reflex to direct LDL Fasting   Result Value Ref Range    Cholesterol 215 (H) <200 mg/dL    Triglycerides 109 <150 mg/dL    HDL Cholesterol 39 (L) >49 mg/dL    LDL Cholesterol Calculated 154 (H) <100 mg/dL    Non HDL Cholesterol 176 (H) <130 mg/dL   Glucose   Result Value Ref Range    Glucose 87 70 - 99 mg/dL

## 2018-04-12 NOTE — MR AVS SNAPSHOT
After Visit Summary   4/12/2018    Felicia Bermudez    MRN: [id number]           Patient Information     Date Of Birth          1975        Visit Information        Provider Department      4/12/2018 8:40 AM Reina Mason MD [facility]        Today's Diagnoses     Screening for diabetes mellitus    -  1    Insomnia, unspecified type        Encounter for screening for cardiovascular disorders        Encounter for general adult medical examination with abnormal findings        Visit for screening mammogram          Care Instructions    Schedule mammogram at [facility] [phone]        Preventive Health Recommendations  Female Ages 40 to 49    Yearly exam:     See your health care provider every year in order to  1. Review health changes.   2. Discuss preventive care.    3. Review your medicines if your doctor prescribed any.      Get a Pap test every three years (unless you have an abnormal result and your provider advises testing more often).      If you get Pap tests with HPV test, you only need to test every 5 years, unless you have an abnormal result. You do not need a Pap test if your uterus was removed (hysterectomy) and you have not had cancer.      You should be tested each year for STDs (sexually transmitted diseases), if you're at risk.       Ask your doctor if you should have a mammogram.      Have a colonoscopy (test for colon cancer) if someone in your family has had colon cancer or polyps before age 50.       Have a cholesterol test every 5 years.       Have a diabetes test (fasting glucose) after age 45. If you are at risk for diabetes, you should have this test every 3 years.    Shots: Get a flu shot each year. Get a tetanus shot every 10 years.     Nutrition:     Eat at least 5 servings of fruits and vegetables each day.    Eat whole-grain bread, whole-wheat pasta and brown rice instead of white grains and rice.    Talk to your provider about Calcium  "and Vitamin D.     Lifestyle    Exercise at least 150 minutes a week (an average of 30 minutes a day, 5 days a week). This will help you control your weight and prevent disease.    Limit alcohol to one drink per day.    No smoking.     Wear sunscreen to prevent skin cancer.    See your dentist every six months for an exam and cleaning.          Follow-ups after your visit        Future tests that were ordered for you today     Open Future Orders        Priority Expected Expires Ordered    MA Screening Digital Bilateral Routine  2019            Who to contact     If you have questions or need follow up information about today's clinic visit or your schedule please contact [facility] directly at [phone].  Normal or non-critical lab and imaging results will be communicated to you by MyChart, letter or phone within 4 business days after the clinic has received the results. If you do not hear from us within 7 days, please contact the clinic through [facility] or phone. If you have a critical or abnormal lab result, we will notify you by phone as soon as possible.  Submit refill requests through [employer] or call your pharmacy and they will forward the refill request to us. Please allow 3 business days for your refill to be completed.          Additional Information About Your Visit        YatedoharRECESS. Information     [employer]farms lets you send messages to your doctor, view your test results, renew your prescriptions, schedule appointments and more. To sign up, go to [email or web address] . Click on \"Log in\" on the left side of the screen, which will take you to the Welcome page. Then click on \"Sign up Now\" on the right side of the page.     You will be asked to enter the access code listed below, as well as some personal information. Please follow the directions to create your username and password.     Your access code is: [email or web address]  Expires: 2018 12:45 PM     Your access code will  in " "90 days. If you need help or a new code, please call your [facility] clinic or [phone].        Care EveryWhere ID     This is your Care EveryWhere ID. This could be used by other organizations to access your [facility] medical records  [id number]        Your Vitals Were     Pulse Temperature Height Last Period Pulse Oximetry BMI (Body Mass Index)    68 97.7  F (36.5  C) (Oral) 5' 3\" (1.6 m) 04/12/2018 98% 27.39 kg/m2       Blood Pressure from Last 3 Encounters:   04/12/18 96/64   04/11/18 112/70   03/30/18 112/72    Weight from Last 3 Encounters:   04/12/18 154 lb 9.6 oz (70.1 kg)   04/11/18 157 lb (71.2 kg)   03/30/18 161 lb (73 kg)              We Performed the Following     Glucose     Lipid panel reflex to direct LDL Fasting          Today's Medication Changes          These changes are accurate as of 4/12/18  9:23 AM.  If you have any questions, ask your nurse or doctor.               Start taking these medicines.        Dose/Directions    zaleplon 10 MG capsule   Commonly known as:  SONATA   Used for:  Insomnia, unspecified type   Started by:  Reina Mason MD        Dose:  10 mg   Take 1 capsule (10 mg) by mouth At Bedtime   Quantity:  30 capsule   Refills:  5            Where to get your medicines      Some of these will need a paper prescription and others can be bought over the counter.  Ask your nurse if you have questions.     Bring a paper prescription for each of these medications     zaleplon 10 MG capsule                Primary Care Provider Office Phone # Fax #    Jose Daniel Arredondo [phone] [address] [place] [address]        Equal Access to Services     [facility] AH: Hadii derek Henriquez, washoaibda luqadaha, qaybta kaalmasimran sherwood, demetri graham. So [facility] [phone].    ATENCIÓN: Si habla español, tiene a paige disposición servicios gratuitos de asistencia lingüística. Llame al [phone].    We comply with " applicable federal civil rights laws and [place] laws. We do not discriminate on the basis of race, color, national origin, age, disability, sex, sexual orientation, or gender identity.            Thank you!     Thank you for choosing [facility]  for your care. Our goal is always to provide you with excellent care. Hearing back from our patients is one way we can continue to improve our services. Please take a few minutes to complete the written survey that you may receive in the mail after your visit with us. Thank you!             Your Updated Medication List - Protect others around you: Learn how to safely use, store and throw away your medicines at www.disposemymeds.org.          This list is accurate as of 4/12/18  9:23 AM.  Always use your most recent med list.                   Brand Name Dispense Instructions for use Diagnosis    HYDROXYZINE HCL PO      Take 20 mg by mouth every 4 hours as needed For hives        IBUPROFEN PO      Take 400 mg by mouth every 6 hours as needed        MULTIVITAMIN PO           zaleplon 10 MG capsule    SONATA    30 capsule    Take 1 capsule (10 mg) by mouth At Bedtime    Insomnia, unspecified type

## 2018-05-24 ENCOUNTER — OFFICE VISIT (OUTPATIENT)
Dept: URGENT CARE | Facility: URGENT CARE | Age: 43
End: 2018-05-24
Payer: COMMERCIAL

## 2018-05-24 VITALS
WEIGHT: 154 LBS | TEMPERATURE: 99.1 F | BODY MASS INDEX: 27.28 KG/M2 | HEART RATE: 65 BPM | OXYGEN SATURATION: 98 % | SYSTOLIC BLOOD PRESSURE: 109 MMHG | DIASTOLIC BLOOD PRESSURE: 63 MMHG

## 2018-05-24 DIAGNOSIS — Z88.9 MULTIPLE ALLERGIES: Primary | ICD-10-CM

## 2018-05-24 PROCEDURE — 99214 OFFICE O/P EST MOD 30 MIN: CPT | Mod: 25 | Performed by: FAMILY MEDICINE

## 2018-05-24 PROCEDURE — 96372 THER/PROPH/DIAG INJ SC/IM: CPT | Performed by: FAMILY MEDICINE

## 2018-05-24 RX ORDER — CETIRIZINE HYDROCHLORIDE 10 MG/1
10 TABLET ORAL EVERY EVENING
Qty: 30 TABLET | Refills: 1 | Status: SHIPPED | OUTPATIENT
Start: 2018-05-24 | End: 2019-04-25

## 2018-05-24 RX ORDER — TRIAMCINOLONE ACETONIDE 40 MG/ML
40 INJECTION, SUSPENSION INTRA-ARTICULAR; INTRAMUSCULAR ONCE
Qty: 1 ML | Refills: 0 | OUTPATIENT
Start: 2018-05-24 | End: 2019-02-07

## 2018-05-24 NOTE — PATIENT INSTRUCTIONS
Okay to take zyrtec 10 mg - 2 tablets daily  Consider sudafed 60 mg every 6 hours as needed to help with congestion.      Seasonal Allergy  Seasonal allergy is also called hay fever. It may occur after a person is exposed to pollens released from grasses, weeds, trees and shrubs. This type of allergy occurs during the spring and summer when the pollen contacts the lining of the nose, eyes, eyelids, sinuses and throat. This causes histamine to be released from the tissues. Histamine causes itching and swelling. This may produce a watery discharge from the eyes or nose. Violent sneezing, nasal congestion, post-nasal drip, itching of the eyes, nose, throat and mouth, scratchy throat, and dry cough may also occur.  Home care  Seasonal allergy cannot be cured, but symptoms can be reduced by these measures:    Stay away from or limit your time near the allergen as much as you can:    ? Stay indoors on windy days of pollen season.   ? Keep windows and doors closed. Use air conditioning instead in your home and car. This filters the air.  ? Change air conditioner filters often.  ? Take a shower, wash your hair, and change clothes after being outdoors.  ? Put on a NIOSH-rated 95 filter mask when working outdoors. Before going outside, take your allergy medicine as advised by your healthcare provider.    Decongestant pills and sprays reduce tissue swelling and watery discharge. Overuse of nasal decongestant sprays may make symptoms worse. Do not use these more often than recommended. Sometimes you can experience a rebound effect (symptoms worsen), when stopping them. Talk to your healthcare provider or pharmacist about these medicines before taking them, especially if you have high blood pressure or heart problems.     Antihistamines block the release of histamine during the allergic response. They work better when taken before symptoms develop. Unless a prescription antihistamine was prescribed, you can take over-the-counter  antihistamines that do not cause drowsiness.  Ask your pharmacist for suggestions.    Steroid nasal sprays or oral steroids may also be prescribed for more severe symptoms. These help to reduce the local inflammation that can add to the allergic response.    If you have asthma, pollen season may make your asthma symptoms worse. It is important that you use your asthma medicines as directed during this time to prevent or treat attacks. Some persons with asthma have asthma symptoms that get worse when they take antihistamines. This is due to the drying effect on the lungs. If you notice this, stop the antihistamines, drink extra fluids and notify your doctor.    If you have sinus congestion or drainage, a saline nasal rinse may give relief. A saline nasal rinse lessens the swelling and clears excess mucus. This allows sinuses to drain. Prepackaged kits are sold at most drug stores. These contain pre-mixed salt packets and an irrigation device.  Follow-up care  Follow up with your healthcare provider, or as advised. If you have been referred to a specialist, make an appointment promptly.  When to seek medical advice  Call your healthcare provider right away for any of the following:    Facial, ear or sinus pain; colored drainage from the nose    Headaches    You have asthma and your asthma symptoms do not respond to the usual doses of your medicine    Cough with colored sputum (mucus)    Fever of 100.4 F (38 C) or higher, or as directed by the healthcare provider  Call 911  Call 911 if any of these occur:    Trouble breathing or swallowing, wheezing    Hoarse voice, trouble speaking, or drooling    Confusion    Very drowsy or trouble awakening    Fainting or loss of consciousness    Rapid heart rate, or weak pulse    Low blood pressure    Feeling of doom    Nausea, vomiting, abdominal pain, diarrhea    Vomiting blood, or large amounts of blood in stool    Seizure    Cold, moist, or pale (blue in color) skin  Date Last  Reviewed: 5/1/2017 2000-2017 [employer]. [address] [place] [address], [place] [address]. All rights reserved. This information is not intended as a substitute for professional medical care. Always follow your healthcare professional's instructions.

## 2018-05-24 NOTE — MR AVS SNAPSHOT
After Visit Summary   5/24/2018    Felicia Bermudez    MRN: [id number]           Patient Information     Date Of Birth          1975        Visit Information        Provider Department      5/24/2018 3:50 PM Gilberto Webster MD [facility]        Today's Diagnoses     Multiple allergies    -  1      Care Instructions    Okay to take zyrtec 10 mg - 2 tablets daily  Consider sudafed 60 mg every 6 hours as needed to help with congestion.      Seasonal Allergy  Seasonal allergy is also called hay fever. It may occur after a person is exposed to pollens released from grasses, weeds, trees and shrubs. This type of allergy occurs during the spring and summer when the pollen contacts the lining of the nose, eyes, eyelids, sinuses and throat. This causes histamine to be released from the tissues. Histamine causes itching and swelling. This may produce a watery discharge from the eyes or nose. Violent sneezing, nasal congestion, post-nasal drip, itching of the eyes, nose, throat and mouth, scratchy throat, and dry cough may also occur.  Home care  Seasonal allergy cannot be cured, but symptoms can be reduced by these measures:    Stay away from or limit your time near the allergen as much as you can:    ? Stay indoors on windy days of pollen season.   ? Keep windows and doors closed. Use air conditioning instead in your home and car. This filters the air.  ? Change air conditioner filters often.  ? Take a shower, wash your hair, and change clothes after being outdoors.  ? Put on a NIOSH-rated 95 filter mask when working outdoors. Before going outside, take your allergy medicine as advised by your healthcare provider.    Decongestant pills and sprays reduce tissue swelling and watery discharge. Overuse of nasal decongestant sprays may make symptoms worse. Do not use these more often than recommended. Sometimes you can experience a rebound effect (symptoms worsen), when stopping them. Talk to  your healthcare provider or pharmacist about these medicines before taking them, especially if you have high blood pressure or heart problems.     Antihistamines block the release of histamine during the allergic response. They work better when taken before symptoms develop. Unless a prescription antihistamine was prescribed, you can take over-the-counter antihistamines that do not cause drowsiness.  Ask your pharmacist for suggestions.    Steroid nasal sprays or oral steroids may also be prescribed for more severe symptoms. These help to reduce the local inflammation that can add to the allergic response.    If you have asthma, pollen season may make your asthma symptoms worse. It is important that you use your asthma medicines as directed during this time to prevent or treat attacks. Some persons with asthma have asthma symptoms that get worse when they take antihistamines. This is due to the drying effect on the lungs. If you notice this, stop the antihistamines, drink extra fluids and notify your doctor.    If you have sinus congestion or drainage, a saline nasal rinse may give relief. A saline nasal rinse lessens the swelling and clears excess mucus. This allows sinuses to drain. Prepackaged kits are sold at most drug stores. These contain pre-mixed salt packets and an irrigation device.  Follow-up care  Follow up with your healthcare provider, or as advised. If you have been referred to a specialist, make an appointment promptly.  When to seek medical advice  Call your healthcare provider right away for any of the following:    Facial, ear or sinus pain; colored drainage from the nose    Headaches    You have asthma and your asthma symptoms do not respond to the usual doses of your medicine    Cough with colored sputum (mucus)    Fever of 100.4 F (38 C) or higher, or as directed by the healthcare provider  Call 911  Call 911 if any of these occur:    Trouble breathing or swallowing, wheezing    Hoarse voice,  "trouble speaking, or drooling    Confusion    Very drowsy or trouble awakening    Fainting or loss of consciousness    Rapid heart rate, or weak pulse    Low blood pressure    Feeling of doom    Nausea, vomiting, abdominal pain, diarrhea    Vomiting blood, or large amounts of blood in stool    Seizure    Cold, moist, or pale (blue in color) skin  Date Last Reviewed: 5/1/2017 2000-2017 [employer]. [address] [place] [address]. All rights reserved. This information is not intended as a substitute for professional medical care. Always follow your healthcare professional's instructions.                Follow-ups after your visit        Who to contact     If you have questions or need follow up information about today's clinic visit or your schedule please contact [facility] directly at [phone].  Normal or non-critical lab and imaging results will be communicated to you by [facility], letter or phone within 4 business days after the clinic has received the results. If you do not hear from us within 7 days, please contact the clinic through [facility] or phone. If you have a critical or abnormal lab result, we will notify you by phone as soon as possible.  Submit refill requests through "[employer] or call your pharmacy and they will forward the refill request to us. Please allow 3 business days for your refill to be completed.          Additional Information About Your Visit        "[employer] Information     "[employer] lets you send messages to your doctor, view your test results, renew your prescriptions, schedule appointments and more. To sign up, go to [email or web address] . Click on \"Log in\" on the left side of the screen, which will take you to the Welcome page. Then click on \"Sign up Now\" on the right side of the page.     You will be asked to enter the access code listed below, as well as some personal information. Please follow the directions to create your username and " password.     Your access code is: [email or web address]  Expires: 2018  4:37 PM     Your access code will  in 90 days. If you need help or a new code, please call your [facility] clinic or [phone].        Care EveryWhere ID     This is your Care EveryWhere ID. This could be used by other organizations to access your [facility] medical records  [id number]        Your Vitals Were     Pulse Temperature Pulse Oximetry BMI (Body Mass Index)          65 99.1  F (37.3  C) (Tympanic) 98% 27.28 kg/m2         Blood Pressure from Last 3 Encounters:   18 109/63   18 96/64   18 112/70    Weight from Last 3 Encounters:   18 154 lb (69.9 kg)   18 154 lb 9.6 oz (70.1 kg)   18 157 lb (71.2 kg)              Today, you had the following     No orders found for display         Today's Medication Changes          These changes are accurate as of 18  4:37 PM.  If you have any questions, ask your nurse or doctor.               Start taking these medicines.        Dose/Directions    cetirizine 10 MG tablet   Commonly known as:  zyrTEC   Used for:  Multiple allergies   Started by:  Gilberto Webster MD        Dose:  10 mg   Take 1 tablet (10 mg) by mouth every evening   Quantity:  30 tablet   Refills:  1       triamcinolone acetonide 40 MG/ML injection   Commonly known as:  KENALOG-40   Used for:  Multiple allergies   Started by:  Gilberto Webster MD        Dose:  40 mg   Inject 1 mL (40 mg) into the muscle once for 1 dose   Quantity:  1 mL   Refills:  0            Where to get your medicines      These medications were sent to [facility] - [address]   [address], [place] [address]     Phone:  [phone]     cetirizine 10 MG tablet         Some of these will need a paper prescription and others can be bought over the counter.  Ask your nurse if you have questions.     You don't need a prescription for these medications      triamcinolone acetonide 40 MG/ML injection                Primary Care Provider Office Phone # Fax #    Jose Daniel Arredondo [phone] [address] [place] [address]        Equal Access to Services     [facility]AR : Hadcherise reed ku olayinkao Sosantinoali, waaxda luqadaha, qaybta kaalmada adeegyada, demetri aguilarn castillo cotton laCarolchicho santos. So [facility] [phone].    ATENCIÓN: Si habla español, tiene a paige disposición servicios gratuitos de asistencia lingüística. Llame al [phone].    We comply with applicable federal civil rights laws and [place] laws. We do not discriminate on the basis of race, color, national origin, age, disability, sex, sexual orientation, or gender identity.            Thank you!     [facility] you for choosing [facility]  for your care. Our goal is always to provide you with excellent care. Hearing back from our patients is one way we can continue to improve our services. Please take a few minutes to complete the written survey that you may receive in the mail after your visit with us. Thank you!             Your Updated Medication List - Protect others around you: Learn how to safely use, store and throw away your medicines at www.disposemymeds.org.          This list is accurate as of 5/24/18  4:37 PM.  Always use your most recent med list.                   Brand Name Dispense Instructions for use Diagnosis    cetirizine 10 MG tablet    zyrTEC    30 tablet    Take 1 tablet (10 mg) by mouth every evening    Multiple allergies       HYDROXYZINE HCL PO      Take 20 mg by mouth every 4 hours as needed For hives        IBUPROFEN PO      Take 400 mg by mouth every 6 hours as needed        MULTIVITAMIN PO           triamcinolone acetonide 40 MG/ML injection    KENALOG-40    1 mL    Inject 1 mL (40 mg) into the muscle once for 1 dose    Multiple allergies       zaleplon 10 MG capsule    SONATA    30 capsule    Take 1 capsule (10 mg) by mouth At Bedtime     Insomnia, unspecified type

## 2018-05-24 NOTE — PROGRESS NOTES
SUBJECTIVE:   Felicia Bermudez is a 42 year old female presenting with a chief complaint of sinus pressure, congestion, itchy eyes.  Had wheezing over the weekend.  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  Denies any purulent rhinitis, no fever.  Endorsed fatigue  Onset of symptoms was 10 day(s) ago.  Course of illness is worsening.    Severity moderate  Current and Associated symptoms: congestion - clear, itchy eyes, runny nose, fatigue  Treatment measures tried include : zyrtec, allergra, claritin, flonase.  Predisposing factors include seasonal allergies, environmental allergies.    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    NIL, +HPV 18, plan colp     History of colposcopy 11/16/15    No bx taken     Hives     multiple allergies - food, environmental     Insomnia      Current Outpatient Prescriptions   Medication Sig Dispense Refill     HYDROXYZINE HCL PO Take 20 mg by mouth every 4 hours as needed For hives       IBUPROFEN PO Take 400 mg by mouth every 6 hours as needed        Multiple Vitamins-Minerals (MULTIVITAMIN OR)        zaleplon (SONATA) 10 MG capsule Take 1 capsule (10 mg) by mouth At Bedtime (Patient not taking: Reported on 5/24/2018) 30 capsule 5     Social History   Substance Use Topics     Smoking status: Never Smoker     Smokeless tobacco: Never Used     Alcohol use No       ROS:  Review of systems negative except as stated above.    OBJECTIVE:  /63 (BP Location: Right arm, Patient Position: Chair, Cuff Size: Adult Large)  Pulse 65  Temp 99.1  F (37.3  C) (Tympanic)  Wt 154 lb (69.9 kg)  SpO2 98%  BMI 27.28 kg/m2  GENERAL APPEARANCE: healthy, alert and no distress  EYES: EOMI,  PERRL, conjunctiva clear  HENT: ear canals and TM's normal.  Nose and mouth without ulcers, erythema or lesions.  Sinus tenderness on  percussion  RESP: lungs clear to auscultation - no rales, rhonchi or wheezes  CV: regular rates and rhythm, normal S1 S2, no murmur noted  PSYCH: mentation appears normal and affect normal/bright    ASSESSMENT/PLAN:  (Z88.9) Multiple allergies  (primary encounter diagnosis)  Plan: triamcinolone acetonide (KENALOG-40) 40 MG/ML         injection, cetirizine (ZYRTEC) 10 MG tablet,         TRIAMCINOLONE ACET INJ 10 MG, INJECTION         INTRAMUSCULAR OR SUB-Q            Worsening allergies - multiple - environmental, seasonal, and foods, will given Kenalog 40 mg IM X1.  Encourage to minimize exposure as much as possible.  Okay to take double dose of zyrtec, RX given.  Encourage plenty of fluids and rest.  Reviewed purulent rhinorrhea with symptoms of sinus pressure would be more appropriate for antibiotic if last more than 10 days.    Return to clinic if no resolution of symptoms.    Gilbetro Webster MD  May 24, 2018 5:00 PM

## 2018-05-28 ENCOUNTER — OFFICE VISIT (OUTPATIENT)
Dept: URGENT CARE | Facility: URGENT CARE | Age: 43
End: 2018-05-28
Payer: COMMERCIAL

## 2018-05-28 VITALS
HEART RATE: 96 BPM | WEIGHT: 157 LBS | HEIGHT: 63 IN | TEMPERATURE: 98.8 F | OXYGEN SATURATION: 100 % | SYSTOLIC BLOOD PRESSURE: 122 MMHG | BODY MASS INDEX: 27.82 KG/M2 | DIASTOLIC BLOOD PRESSURE: 60 MMHG

## 2018-05-28 DIAGNOSIS — R53.83 FATIGUE, UNSPECIFIED TYPE: ICD-10-CM

## 2018-05-28 DIAGNOSIS — J01.90 ACUTE SINUSITIS WITH SYMPTOMS > 10 DAYS: Primary | ICD-10-CM

## 2018-05-28 LAB
BASOPHILS # BLD AUTO: 0 10E9/L (ref 0–0.2)
BASOPHILS NFR BLD AUTO: 0.4 %
DIFFERENTIAL METHOD BLD: ABNORMAL
EOSINOPHIL # BLD AUTO: 0.1 10E9/L (ref 0–0.7)
EOSINOPHIL NFR BLD AUTO: 0.6 %
ERYTHROCYTE [DISTWIDTH] IN BLOOD BY AUTOMATED COUNT: 13.6 % (ref 10–15)
HCT VFR BLD AUTO: 41 % (ref 35–47)
HGB BLD-MCNC: 13.2 G/DL (ref 11.7–15.7)
LYMPHOCYTES # BLD AUTO: 3.5 10E9/L (ref 0.8–5.3)
LYMPHOCYTES NFR BLD AUTO: 33.2 %
MCH RBC QN AUTO: 26.1 PG (ref 26.5–33)
MCHC RBC AUTO-ENTMCNC: 32.2 G/DL (ref 31.5–36.5)
MCV RBC AUTO: 81 FL (ref 78–100)
MONOCYTES # BLD AUTO: 1 10E9/L (ref 0–1.3)
MONOCYTES NFR BLD AUTO: 9.6 %
NEUTROPHILS # BLD AUTO: 5.9 10E9/L (ref 1.6–8.3)
NEUTROPHILS NFR BLD AUTO: 56.2 %
PLATELET # BLD AUTO: 242 10E9/L (ref 150–450)
RBC # BLD AUTO: 5.06 10E12/L (ref 3.8–5.2)
WBC # BLD AUTO: 10.5 10E9/L (ref 4–11)

## 2018-05-28 PROCEDURE — 86618 LYME DISEASE ANTIBODY: CPT | Performed by: INTERNAL MEDICINE

## 2018-05-28 PROCEDURE — 85025 COMPLETE CBC W/AUTO DIFF WBC: CPT | Performed by: INTERNAL MEDICINE

## 2018-05-28 PROCEDURE — 82306 VITAMIN D 25 HYDROXY: CPT | Performed by: INTERNAL MEDICINE

## 2018-05-28 PROCEDURE — 99214 OFFICE O/P EST MOD 30 MIN: CPT | Performed by: PHYSICIAN ASSISTANT

## 2018-05-28 PROCEDURE — 84443 ASSAY THYROID STIM HORMONE: CPT | Performed by: INTERNAL MEDICINE

## 2018-05-28 PROCEDURE — 80048 BASIC METABOLIC PNL TOTAL CA: CPT | Performed by: INTERNAL MEDICINE

## 2018-05-28 PROCEDURE — 36415 COLL VENOUS BLD VENIPUNCTURE: CPT | Performed by: INTERNAL MEDICINE

## 2018-05-28 RX ORDER — AZITHROMYCIN 250 MG/1
TABLET, FILM COATED ORAL
Qty: 6 TABLET | Refills: 0 | Status: SHIPPED | OUTPATIENT
Start: 2018-05-28 | End: 2018-06-09

## 2018-05-28 NOTE — PROGRESS NOTES
"SUBJECTIVE:   Felicia Bermudez is a 42 year old female presenting with a chief complaint of congestion and facial pain and pressure,.  Hurts on the right side if face . Feels dizzy when bends forward and pressure in sinus  Feels really fatigued and foggy also for several months. Given trazodone for sleep but stopped taking as not helping.  Onset of symptoms was several months ago.  Course of illness is worsening.    Severity moderate  Current and Associated symptoms: negative other than stated above  Treatment measures tried include Tylenol/Ibuprofen, Fluids and Rest.  Predisposing factors include None.    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    NIL, +HPV 18, plan colp     History of colposcopy 11/16/15    No bx taken     Hives     multiple allergies - food, environmental     Insomnia      Current Outpatient Prescriptions   Medication Sig Dispense Refill     cetirizine (ZYRTEC) 10 MG tablet Take 1 tablet (10 mg) by mouth every evening 30 tablet 1     HYDROXYZINE HCL PO Take 20 mg by mouth every 4 hours as needed For hives       IBUPROFEN PO Take 400 mg by mouth every 6 hours as needed        Multiple Vitamins-Minerals (MULTIVITAMIN OR)        zaleplon (SONATA) 10 MG capsule Take 1 capsule (10 mg) by mouth At Bedtime (Patient not taking: Reported on 5/24/2018) 30 capsule 5     Social History   Substance Use Topics     Smoking status: Never Smoker     Smokeless tobacco: Never Used     Alcohol use No       ROS:  Review of systems negative except as stated above.    OBJECTIVE:  /60 (BP Location: Right arm, Patient Position: Chair, Cuff Size: Adult Regular)  Pulse 96  Temp 98.8  F (37.1  C) (Oral)  Ht 5' 3\" (1.6 m)  Wt 157 lb (71.2 kg)  SpO2 100%  BMI 27.81 kg/m2  GENERAL APPEARANCE: healthy, alert and no distress  EYES: EOMI,  PERRL, conjunctiva clear  HENT: TM's normal bilaterally, nose and mouth without erythema, ulcers " or lesions, nasal turbinates erythematous, swollen, oral mucous membranes moist, no erythema noted and maxillary sinus tenderness   NECK: supple, nontender, no lymphadenopathy  RESP: lungs clear to auscultation - no rales, rhonchi or wheezes  CV: regular rates and rhythm, normal S1 S2, no murmur noted  ABDOMEN:  soft, nontender, no HSM or masses and bowel sounds normal  NEURO: Normal strength and tone, sensory exam grossly normal,  normal speech and mentation  SKIN: no suspicious lesions or rashes    Results for orders placed or performed in visit on 05/28/18   CBC with platelets and differential   Result Value Ref Range    WBC 10.5 4.0 - 11.0 10e9/L    RBC Count 5.06 3.8 - 5.2 10e12/L    Hemoglobin 13.2 11.7 - 15.7 g/dL    Hematocrit 41.0 35.0 - 47.0 %    MCV 81 78 - 100 fl    MCH 26.1 (L) 26.5 - 33.0 pg    MCHC 32.2 31.5 - 36.5 g/dL    RDW 13.6 10.0 - 15.0 %    Platelet Count 242 150 - 450 10e9/L    Diff Method Automated Method     % Neutrophils 56.2 %    % Lymphocytes 33.2 %    % Monocytes 9.6 %    % Eosinophils 0.6 %    % Basophils 0.4 %    Absolute Neutrophil 5.9 1.6 - 8.3 10e9/L    Absolute Lymphocytes 3.5 0.8 - 5.3 10e9/L    Absolute Monocytes 1.0 0.0 - 1.3 10e9/L    Absolute Eosinophils 0.1 0.0 - 0.7 10e9/L    Absolute Basophils 0.0 0.0 - 0.2 10e9/L   TSH with free T4 reflex   Result Value Ref Range    TSH 2.54 0.40 - 4.00 mU/L   Vitamin D Deficiency   Result Value Ref Range    Vitamin D Deficiency screening 16 (L) 20 - 75 ug/L   Lyme Disease Manuela with reflex to WB Serum   Result Value Ref Range    Lyme Disease Antibodies Serum 0.03 0.00 - 0.89   Basic metabolic panel  (Ca, Cl, CO2, Creat, Gluc, K, Na, BUN)   Result Value Ref Range    Sodium 138 133 - 144 mmol/L    Potassium 4.2 3.4 - 5.3 mmol/L    Chloride 103 94 - 109 mmol/L    Carbon Dioxide 23 20 - 32 mmol/L    Anion Gap 12 3 - 14 mmol/L    Glucose 93 70 - 99 mg/dL    Urea Nitrogen 8 7 - 30 mg/dL    Creatinine 0.67 0.52 - 1.04 mg/dL    GFR Estimate >90  >60 mL/min/1.7m2    GFR Estimate If Black >90 >60 mL/min/1.7m2    Calcium 9.5 8.5 - 10.1 mg/dL         assessment/plan:  (J01.90) Acute sinusitis with symptoms > 10 days  (primary encounter diagnosis)  Comment:   Plan: : azithromycin (ZITHROMAX) 250 MG         tablet        Trial of med for sinus issues and use OTC med for sx relief.    (R53.83) Fatigue, unspecified type  Comment:   Plan: CBC with platelets and differential, TSH with         free T4 reflex, Vitamin D Deficiency, Lyme         Disease Manuela with reflex to WB Serum, Basic         metabolic panel  (Ca, Cl, CO2, Creat, Gluc, K,         Na, BUN)         Labs pending.  FU with PCP for continued sx

## 2018-05-28 NOTE — NURSING NOTE
"Felicia ABREU Aidan;   Chief Complaint   Patient presents with     Headache     congestion in head, dizzy on right side of head, fatigue - feeling foggy, onset in February - was given trazodone for sleep but stopped taking this,      Urgent Care     Initial /60 (BP Location: Right arm, Patient Position: Chair, Cuff Size: Adult Regular)  Pulse 96  Temp 98.8  F (37.1  C) (Oral)  Ht 5' 3\" (1.6 m)  Wt 157 lb (71.2 kg)  SpO2 100%  BMI 27.81 kg/m2 Estimated body mass index is 27.81 kg/(m^2) as calculated from the following:    Height as of this encounter: 5' 3\" (1.6 m).    Weight as of this encounter: 157 lb (71.2 kg)..  BP completed using cuff size regular.  Cecelia Hutchison R.N.  "

## 2018-05-28 NOTE — MR AVS SNAPSHOT
"              After Visit Summary   2018    Felicia Bermudez    MRN: [id number]           Patient Information     Date Of Birth          1975        Visit Information        Provider Department      2018 1:25 PM Brittany Ramos PA-C [facility]        Today's Diagnoses     Acute sinusitis with symptoms > 10 days    -  1    Fatigue, unspecified type           Follow-ups after your visit        Who to contact     If you have questions or need follow up information about today's clinic visit or your schedule please contact [facility] directly at [phone].  Normal or non-critical lab and imaging results will be communicated to you by MyChart, letter or phone within 4 business days after the clinic has received the results. If you do not hear from us within 7 days, please contact the clinic through [employer] or phone. If you have a critical or abnormal lab result, we will notify you by phone as soon as possible.  Submit refill requests through [employer] or call your pharmacy and they will forward the refill request to us. Please allow 3 business days for your refill to be completed.          Additional Information About Your Visit        MyChart Information     [employer] lets you send messages to your doctor, view your test results, renew your prescriptions, schedule appointments and more. To sign up, go to [email or web address] . Click on \"Log in\" on the left side of the screen, which will take you to the Welcome page. Then click on \"Sign up Now\" on the right side of the page.     You will be asked to enter the access code listed below, as well as some personal information. Please follow the directions to create your username and password.     Your access code is: [email or web address]  Expires: 2018  4:37 PM     Your access code will  in 90 days. If you need help or a new code, please call your [place] clinic or [phone].        Care EveryWhere ID     This is " "your Care EveryWhere ID. This could be used by other organizations to access your [facility] medical records  [id number]        Your Vitals Were     Pulse Temperature Height Pulse Oximetry BMI (Body Mass Index)       96 98.8  F (37.1  C) (Oral) 5' 3\" (1.6 m) 100% 27.81 kg/m2        Blood Pressure from Last 3 Encounters:   06/09/18 100/62   05/28/18 122/60   05/24/18 109/63    Weight from Last 3 Encounters:   06/09/18 159 lb (72.1 kg)   05/28/18 157 lb (71.2 kg)   05/24/18 154 lb (69.9 kg)              We Performed the Following     Basic metabolic panel  (Ca, Cl, CO2, Creat, Gluc, K, Na, BUN)     CBC with platelets and differential     Lyme Disease Manuela with reflex to WB Serum     TSH with free T4 reflex     Vitamin D Deficiency          Today's Medication Changes          These changes are accurate as of 5/28/18 11:59 PM.  If you have any questions, ask your nurse or doctor.               Start taking these medicines.        Dose/Directions    azithromycin 250 MG tablet   Commonly known as:  ZITHROMAX   Used for:  Acute sinusitis with symptoms > 10 days   Started by:  Brittany Ramos PA-C        Two tablets first day, then one tablet daily for four days.   Quantity:  6 tablet   Refills:  0            Where to get your medicines      Some of these will need a paper prescription and others can be bought over the counter.  Ask your nurse if you have questions.     Bring a paper prescription for each of these medications     azithromycin 250 MG tablet                Primary Care Provider Office Phone # Fax #    Jose Daniel Arredondo [phone] [address] [place] [address]        Equal Access to Services     [facility] AH: Hadcherise Henriquez, mata leigh, demetri rai. So [facility] [phone].    ATENCIÓN: Si habla español, tiene a paige disposición servicios gratuitos de asistencia lingüística. Llame al " [phone].    We comply with applicable federal civil rights laws and [place] laws. We do not discriminate on the basis of race, color, national origin, age, disability, sex, sexual orientation, or gender identity.            Thank you!     Thank you for choosing [facility]  for your care. Our goal is always to provide you with excellent care. Hearing back from our patients is one way we can continue to improve our services. Please take a few minutes to complete the written survey that you may receive in the mail after your visit with us. Thank you!             Your Updated Medication List - Protect others around you: Learn how to safely use, store and throw away your medicines at www.disposemymeds.org.          This list is accurate as of 5/28/18 11:59 PM.  Always use your most recent med list.                   Brand Name Dispense Instructions for use Diagnosis    azithromycin 250 MG tablet    ZITHROMAX    6 tablet    Two tablets first day, then one tablet daily for four days.    Acute sinusitis with symptoms > 10 days       cetirizine 10 MG tablet    zyrTEC    30 tablet    Take 1 tablet (10 mg) by mouth every evening    Multiple allergies       HYDROXYZINE HCL PO      Take 20 mg by mouth every 4 hours as needed For hives        IBUPROFEN PO      Take 400 mg by mouth every 6 hours as needed        MULTIVITAMIN PO           zaleplon 10 MG capsule    SONATA    30 capsule    Take 1 capsule (10 mg) by mouth At Bedtime    Insomnia, unspecified type

## 2018-05-29 LAB
ANION GAP SERPL CALCULATED.3IONS-SCNC: 12 MMOL/L (ref 3–14)
BUN SERPL-MCNC: 8 MG/DL (ref 7–30)
CALCIUM SERPL-MCNC: 9.5 MG/DL (ref 8.5–10.1)
CHLORIDE SERPL-SCNC: 103 MMOL/L (ref 94–109)
CO2 SERPL-SCNC: 23 MMOL/L (ref 20–32)
CREAT SERPL-MCNC: 0.67 MG/DL (ref 0.52–1.04)
GFR SERPL CREATININE-BSD FRML MDRD: >90 ML/MIN/1.7M2
GLUCOSE SERPL-MCNC: 93 MG/DL (ref 70–99)
POTASSIUM SERPL-SCNC: 4.2 MMOL/L (ref 3.4–5.3)
SODIUM SERPL-SCNC: 138 MMOL/L (ref 133–144)
TSH SERPL DL<=0.005 MIU/L-ACNC: 2.54 MU/L (ref 0.4–4)

## 2018-05-30 ENCOUNTER — TELEPHONE (OUTPATIENT)
Dept: URGENT CARE | Facility: URGENT CARE | Age: 43
End: 2018-05-30

## 2018-05-30 DIAGNOSIS — E55.9 VITAMIN D DEFICIENCY: Primary | ICD-10-CM

## 2018-05-30 LAB
B BURGDOR IGG+IGM SER QL: 0.03 (ref 0–0.89)
DEPRECATED CALCIDIOL+CALCIFEROL SERPL-MC: 16 UG/L (ref 20–75)

## 2018-05-30 RX ORDER — ERGOCALCIFEROL 1.25 MG/1
50000 CAPSULE, LIQUID FILLED ORAL
Qty: 4 CAPSULE | Refills: 2 | Status: SHIPPED | OUTPATIENT
Start: 2018-05-30 | End: 2019-02-07

## 2018-05-31 NOTE — TELEPHONE ENCOUNTER
Felicia called asking what pharmacy her Vitamin D prescription was sent to. She called many pharmacies and none of them had received it.  Felicia requested it be sent to [facility] in [place], on [address].  I sent this in to the pharmacy she requested.  Nancy GARSIA RN [facility]

## 2018-06-09 ENCOUNTER — OFFICE VISIT (OUTPATIENT)
Dept: FAMILY MEDICINE | Facility: CLINIC | Age: 43
End: 2018-06-09
Payer: COMMERCIAL

## 2018-06-09 VITALS
SYSTOLIC BLOOD PRESSURE: 100 MMHG | OXYGEN SATURATION: 100 % | BODY MASS INDEX: 28.17 KG/M2 | RESPIRATION RATE: 12 BRPM | WEIGHT: 159 LBS | TEMPERATURE: 98.1 F | HEART RATE: 72 BPM | HEIGHT: 63 IN | DIASTOLIC BLOOD PRESSURE: 62 MMHG

## 2018-06-09 DIAGNOSIS — F41.9 ANXIETY: Primary | ICD-10-CM

## 2018-06-09 DIAGNOSIS — H69.93 DYSFUNCTION OF BOTH EUSTACHIAN TUBES: ICD-10-CM

## 2018-06-09 PROCEDURE — 99213 OFFICE O/P EST LOW 20 MIN: CPT | Performed by: NURSE PRACTITIONER

## 2018-06-09 RX ORDER — PREDNISONE 20 MG/1
40 TABLET ORAL DAILY
Qty: 10 TABLET | Refills: 0 | Status: SHIPPED | OUTPATIENT
Start: 2018-06-09 | End: 2019-02-07

## 2018-06-09 RX ORDER — BUSPIRONE HYDROCHLORIDE 5 MG/1
5 TABLET ORAL 2 TIMES DAILY
Qty: 30 TABLET | Refills: 0 | Status: SHIPPED | OUTPATIENT
Start: 2018-06-09 | End: 2019-01-28

## 2018-06-09 NOTE — MR AVS SNAPSHOT
"              After Visit Summary   2018    Felicia Bermudez    MRN: [id number]           Patient Information     Date Of Birth          1975        Visit Information        Provider Department      2018 10:00 AM Jeanette Hudson NP [facility]        Today's Diagnoses     Anxiety    -  1    Dysfunction of both eustachian tubes           Follow-ups after your visit        Follow-up notes from your care team     Return in about 2 weeks (around 2018) for depression/anxiety recheck.      Who to contact     If you have questions or need follow up information about today's clinic visit or your schedule please contact [facility] directly at [phone].  Normal or non-critical lab and imaging results will be communicated to you by MyChart, letter or phone within 4 business days after the clinic has received the results. If you do not hear from us within 7 days, please contact the clinic through MyChart or phone. If you have a critical or abnormal lab result, we will notify you by phone as soon as possible.  Submit refill requests through [employer] or call your pharmacy and they will forward the refill request to us. Please allow 3 business days for your refill to be completed.          Additional Information About Your Visit        MyChart Information     [employer] lets you send messages to your doctor, view your test results, renew your prescriptions, schedule appointments and more. To sign up, go to [email or web address] . Click on \"Log in\" on the left side of the screen, which will take you to the Welcome page. Then click on \"Sign up Now\" on the right side of the page.     You will be asked to enter the access code listed below, as well as some personal information. Please follow the directions to create your username and password.     Your access code is: [email or web address]  Expires: 2018  4:37 PM     Your access code will  in 90 days. If you need help or a new " "code, please call your [facility] clinic or [phone].        Care EveryWhere ID     This is your Care EveryWhere ID. This could be used by other organizations to access your [facility] medical records  [id number]        Your Vitals Were     Pulse Temperature Respirations Height Pulse Oximetry BMI (Body Mass Index)    72 98.1  F (36.7  C) (Oral) 12 5' 3\" (1.6 m) 100% 28.17 kg/m2       Blood Pressure from Last 3 Encounters:   06/09/18 100/62   05/28/18 122/60   05/24/18 109/63    Weight from Last 3 Encounters:   06/09/18 159 lb (72.1 kg)   05/28/18 157 lb (71.2 kg)   05/24/18 154 lb (69.9 kg)              Today, you had the following     No orders found for display         Today's Medication Changes          These changes are accurate as of 6/9/18 10:21 AM.  If you have any questions, ask your nurse or doctor.               Start taking these medicines.        Dose/Directions    busPIRone 5 MG tablet   Commonly known as:  BUSPAR   Used for:  Anxiety   Started by:  Jeanette Hudson NP        Dose:  5 mg   Take 1 tablet (5 mg) by mouth 2 times daily   Quantity:  30 tablet   Refills:  0       predniSONE 20 MG tablet   Commonly known as:  DELTASONE   Used for:  Dysfunction of both eustachian tubes   Started by:  Jeanette Hudson NP        Dose:  40 mg   Take 2 tablets (40 mg) by mouth daily for 5 days   Quantity:  10 tablet   Refills:  0         Stop taking these medicines if you haven't already. Please contact your care team if you have questions.     azithromycin 250 MG tablet   Commonly known as:  ZITHROMAX   Stopped by:  Jeanette Hudson NP           zaleplon 10 MG capsule   Commonly known as:  SONATA   Stopped by:  Jeanette Hudson NP                Where to get your medicines      [place] [facility] [address]  [address] [facility] [address]     Phone:  [phone]     busPIRone 5 MG tablet    predniSONE 20 MG tablet                " Primary Care Provider Office Phone # Fax #    Jose Daniel Arredondo [phone] [address] [place] [address]        Equal Access to Services     MAYTEWU GUNNAR : Hadcherise derek saavedra erin Henriquez, waaxda luqadaha, qaybta kaalmada presley, demetri cottno lavishjw santos. So [facility] [phone].    ATENCIÓN: Si habla español, tiene a paige disposición servicios gratuitos de asistencia lingüística. Llame al [phone].    We comply with applicable federal civil rights laws and [place] laws. We do not discriminate on the basis of race, color, national origin, age, disability, sex, sexual orientation, or gender identity.            Thank you!     Thank you for choosing [facility]  for your care. Our goal is always to provide you with excellent care. Hearing back from our patients is one way we can continue to improve our services. Please take a few minutes to complete the written survey that you may receive in the mail after your visit with us. Thank you!             Your Updated Medication List - Protect others around you: Learn how to safely use, store and throw away your medicines at www.disposemymeds.org.          This list is accurate as of 6/9/18 10:21 AM.  Always use your most recent med list.                   Brand Name Dispense Instructions for use Diagnosis    busPIRone 5 MG tablet    BUSPAR    30 tablet    Take 1 tablet (5 mg) by mouth 2 times daily    Anxiety       cetirizine 10 MG tablet    zyrTEC    30 tablet    Take 1 tablet (10 mg) by mouth every evening    Multiple allergies       HYDROXYZINE HCL PO      Take 20 mg by mouth every 4 hours as needed For hives        IBUPROFEN PO      Take 400 mg by mouth every 6 hours as needed        MULTIVITAMIN PO           predniSONE 20 MG tablet    DELTASONE    10 tablet    Take 2 tablets (40 mg) by mouth daily for 5 days    Dysfunction of both eustachian tubes       vitamin D 27183 UNIT capsule     ERGOCALCIFEROL    4 capsule    Take 1 capsule (50,000 Units) by mouth every 7 days for 12 doses    Vitamin D deficiency

## 2018-06-09 NOTE — PROGRESS NOTES
SUBJECTIVE:   Felicia Bermudez is a 42 year old female who presents to clinic today for the following health issues:      Consult  Allergies-sinus issues, fatigue, L ear pain, foggy head  Neck pain-has not been going away    Seen for sinus congestion and mental fogginess x2. Given azithromycin and started on vitamin D and not seeing any improvement. Not sleeping well. Is concerned she is unable to focus on her job and having difficulty concentrating. Feels her anxiety may be getting in the way. Having daily anxiety. Single with two children ages 19 and 21. Feels happy. Not sleeping because op her anxiety. Unable to get her mind to shut down.         Problem list and histories reviewed & adjusted, as indicated.  Additional history: as documented    Patient Active Problem List   Diagnosis     Urticaria     Multiple food allergies     PUD (peptic ulcer disease)     Abnormal Pap smear of cervix     High risk HPV infection     Abnormal weight gain     Insomnia, unspecified type     Intractable chronic migraine without aura and without status migrainosus     Menorrhagia with regular cycle     Past Surgical History:   Procedure Laterality Date     DILATION AND CURETTAGE, HYSTEROSCOPY, ABLATE ENDOMETRIUM NOVASURE, COMBINED N/A 3/16/2018    Procedure: COMBINED DILATION AND CURETTAGE, HYSTEROSCOPY, ABLATE ENDOMETRIUM NOVASURE;  Hysteroscopy, Dilation and Curettage, unable to perform Endometrial Ablation with Novasure secondary to uterine perforation, diagnostic laparoscopy, Bilateral Laparoscopic Tubal Ligation ;  Surgeon: Danilo White MD;  Location: [facility]     EYE SURGERY      REmoval of mass left eye     LAPAROSCOPIC TUBAL LIGATION Bilateral 3/16/2018    Procedure: LAPAROSCOPIC TUBAL LIGATION;;  Surgeon: Danilo White MD;  Location: [facility]     LAPAROSCOPY DIAGNOSTIC (GYN)       SURGICAL HISTORY OF -       2010 turbinate surgery       Social History   Substance Use Topics     Smoking status: Never Smoker      "Smokeless tobacco: Never Used     Alcohol use No     Family History   Problem Relation Age of Onset     Neurologic Disorder Mother 57     brain anuerysm     Hypertension Father      Other Cancer Father 70     Testicle Cancer     Other - See Comments Daughter 21     rare blood disorders           Reviewed and updated as needed this visit by clinical staff  Tobacco  Allergies  Meds  Problems  Med Hx  Surg Hx  Fam Hx  Soc Hx        Reviewed and updated as needed this visit by Provider  Allergies  Meds  Problems         ROS:  Constitutional, HEENT, cardiovascular, pulmonary, gi and gu systems are negative, except as otherwise noted.    OBJECTIVE:     /62 (BP Location: Right arm, Patient Position: Chair, Cuff Size: Adult Regular)  Pulse 72  Temp 98.1  F (36.7  C) (Oral)  Resp 12  Ht 5' 3\" (1.6 m)  Wt 159 lb (72.1 kg)  SpO2 100%  BMI 28.17 kg/m2  Body mass index is 28.17 kg/(m^2).  GENERAL: healthy, alert and no distress  EYES: Eyes grossly normal to inspection, PERRL and conjunctivae and sclerae normal  HENT: normal cephalic/atraumatic, both ears: red and boggy canal, nose and mouth without ulcers or lesions, oropharynx clear and oral mucous membranes moist  NECK: no adenopathy, no asymmetry, masses, or scars and thyroid normal to palpation  RESP: lungs clear to auscultation - no rales, rhonchi or wheezes  CV: regular rate and rhythm, normal S1 S2, no S3 or S4, no murmur, click or rub, no peripheral edema and peripheral pulses strong  PSYCH: mentation appears normal and affect normal/bright        ASSESSMENT/PLAN:             1. Anxiety  Buspar to try for anxiety.   - busPIRone (BUSPAR) 5 MG tablet; Take 1 tablet (5 mg) by mouth 2 times daily  Dispense: 30 tablet; Refill: 0    2. Dysfunction of both eustachian tubes  Prednisone for congestion.   - predniSONE (DELTASONE) 20 MG tablet; Take 2 tablets (40 mg) by mouth daily for 5 days  Dispense: 10 tablet; Refill: 0    Continue with vitamin D " replacement as directed.     Jeanette Hudson, MARLENE  [facility]

## 2018-06-22 ENCOUNTER — OFFICE VISIT (OUTPATIENT)
Dept: FAMILY MEDICINE | Facility: CLINIC | Age: 43
End: 2018-06-22
Payer: COMMERCIAL

## 2018-06-22 VITALS — SYSTOLIC BLOOD PRESSURE: 118 MMHG | TEMPERATURE: 98.6 F | DIASTOLIC BLOOD PRESSURE: 72 MMHG

## 2018-06-22 DIAGNOSIS — F41.9 ANXIETY: Primary | ICD-10-CM

## 2018-06-22 PROCEDURE — 99213 OFFICE O/P EST LOW 20 MIN: CPT | Performed by: NURSE PRACTITIONER

## 2018-06-22 RX ORDER — FLUOXETINE 10 MG/1
10 CAPSULE ORAL DAILY
Qty: 30 CAPSULE | Refills: 0 | Status: SHIPPED | OUTPATIENT
Start: 2018-06-22 | End: 2019-01-28

## 2018-06-22 NOTE — PROGRESS NOTES
"  SUBJECTIVE:   Felicia Bermudez is a 42 year old female who presents to clinic today for the following health issues:    Here to follow up on low vitamin D. Feels she is not tolerating and is making her anxiety worse. Has a long list of complaints that she has discussed with multiple providers and nobody can explain why. Has been to ENT for her ongoing sinus congestion and continues on flonase. Has been to other specialists with no resolve. Feels anxious almost daily and is not sleeping. Started on Buspar for anxiety and it \"was helpful but doesn't want to take it\".           Problem list and histories reviewed & adjusted, as indicated.  Additional history: none    Patient Active Problem List   Diagnosis     Urticaria     Multiple food allergies     PUD (peptic ulcer disease)     Abnormal Pap smear of cervix     High risk HPV infection     Abnormal weight gain     Insomnia, unspecified type     Intractable chronic migraine without aura and without status migrainosus     Menorrhagia with regular cycle     Past Surgical History:   Procedure Laterality Date     DILATION AND CURETTAGE, HYSTEROSCOPY, ABLATE ENDOMETRIUM NOVASURE, COMBINED N/A 3/16/2018    Procedure: COMBINED DILATION AND CURETTAGE, HYSTEROSCOPY, ABLATE ENDOMETRIUM NOVASURE;  Hysteroscopy, Dilation and Curettage, unable to perform Endometrial Ablation with Novasure secondary to uterine perforation, diagnostic laparoscopy, Bilateral Laparoscopic Tubal Ligation ;  Surgeon: Danilo White MD;  [facility]     EYE SURGERY      REmoval of mass left eye     LAPAROSCOPIC TUBAL LIGATION Bilateral 3/16/2018    Procedure: LAPAROSCOPIC TUBAL LIGATION;;  Surgeon: Danilo White MD;  [facility]: [facility]     LAPAROSCOPY DIAGNOSTIC (GYN)       SURGICAL HISTORY OF -       2010 turbinate surgery       Social History   Substance Use Topics     Smoking status: Never Smoker     Smokeless tobacco: Never Used     Alcohol use No     Family History   Problem Relation Age " "of Onset     Neurologic Disorder Mother 57     brain anuerysm     Hypertension Father      Other Cancer Father 70     Testicle Cancer     Other - See Comments Daughter 21     rare blood disorders           Reviewed and updated as needed this visit by clinical staff  Tobacco  Allergies  Meds  Problems       Reviewed and updated as needed this visit by Provider  Allergies  Meds  Problems         ROS:  Constitutional, HEENT, cardiovascular, pulmonary, gi and gu systems are negative, except as otherwise noted.    OBJECTIVE:     /72 (BP Location: Right arm, Patient Position: Chair, Cuff Size: Adult Regular)  Temp 98.6  F (37  C) (Oral)  LMP 06/11/2018  Breastfeeding? No  There is no height or weight on file to calculate BMI.  GENERAL: healthy, alert and no distress  EYES: Eyes grossly normal to inspection, PERRL and conjunctivae and sclerae normal  HENT: ear canals and TM's normal, nose and mouth without ulcers or lesions  NECK: no adenopathy, no asymmetry, masses, or scars and thyroid normal to palpation  RESP: lungs clear to auscultation - no rales, rhonchi or wheezes  CV: regular rate and rhythm, normal S1 S2, no S3 or S4, no murmur, click or rub, no peripheral edema and peripheral pulses strong  PSYCH: mentation appears normal, affect normal/bright  PSYCH: mentation appears normal, affect normal/bright and anxious        ASSESSMENT/PLAN:             1. Anxiety  Buspar was helpful but doesn't want to take. Has tried multiple medications and \"non have worked\". Will try prozac but unlikely will be effective. Tried to redirect her to alternative therapies as may be more effective.   - FLUoxetine (PROZAC) 10 MG capsule; Take 1 capsule (10 mg) by mouth daily  Dispense: 30 capsule; Refill: 0        Jeanette Hudson NP  [facility]    "

## 2018-06-25 ENCOUNTER — OFFICE VISIT (OUTPATIENT)
Dept: OBGYN | Facility: CLINIC | Age: 43
End: 2018-06-25
Payer: COMMERCIAL

## 2018-06-25 VITALS
SYSTOLIC BLOOD PRESSURE: 118 MMHG | BODY MASS INDEX: 28.52 KG/M2 | DIASTOLIC BLOOD PRESSURE: 74 MMHG | HEART RATE: 64 BPM | WEIGHT: 161 LBS

## 2018-06-25 DIAGNOSIS — N92.0 MENORRHAGIA WITH REGULAR CYCLE: Primary | ICD-10-CM

## 2018-06-25 PROCEDURE — 99213 OFFICE O/P EST LOW 20 MIN: CPT | Performed by: OBSTETRICS & GYNECOLOGY

## 2018-06-25 RX ORDER — MEDROXYPROGESTERONE ACETATE 10 MG
TABLET ORAL
Qty: 90 TABLET | Refills: 1 | Status: SHIPPED | OUTPATIENT
Start: 2018-06-25 | End: 2019-02-07

## 2018-06-25 NOTE — NURSING NOTE
"Chief Complaint   Patient presents with     Vaginal Problem     Vaginal bleeding x 2weeks including cramping, bloating and low back pain. Attempted ablation in 2018.       Initial /74  Pulse 64  Wt 161 lb (73 kg)  LMP 2018  Breastfeeding? No  BMI 28.52 kg/m2 Estimated body mass index is 28.52 kg/(m^2) as calculated from the following:    Height as of 18: 5' 3\" (1.6 m).    Weight as of this encounter: 161 lb (73 kg).  BP completed using cuff size: regular        Brittany Lyndsey AL               "

## 2018-06-25 NOTE — PROGRESS NOTES
Here in f/u abnormal bleeding     Underwent D&C/hysteroscopy/failed Novasure ablation (secondary to uterine perforation) and tubal ligation     Now has heavy bleeding again    O: no exam  A: menorrhagia  P: recommend Provera to control bleeding       -then cyclic Provera, repeat endometrial ablation, hysterectomy

## 2018-08-21 PROBLEM — F41.9 ANXIETY: Status: ACTIVE | Noted: 2018-06-09

## 2018-08-21 PROBLEM — F41.9 ANXIETY: Status: ACTIVE | Noted: 2018-08-21

## 2018-09-19 DIAGNOSIS — E55.9 VITAMIN D DEFICIENCY: Primary | ICD-10-CM

## 2018-09-20 PROBLEM — E55.9 VITAMIN D DEFICIENCY: Status: ACTIVE | Noted: 2018-09-20

## 2018-09-20 RX ORDER — ERGOCALCIFEROL 1.25 MG/1
CAPSULE, LIQUID FILLED ORAL
Qty: 12 CAPSULE | Refills: 0 | OUTPATIENT
Start: 2018-09-20

## 2018-09-20 NOTE — TELEPHONE ENCOUNTER
Patient needs lab first - ordered.  Please have her make lab only appt.    Reina Mason MD  Internal Medicine/Pediatrics  [facility]

## 2018-09-20 NOTE — TELEPHONE ENCOUNTER
LM asking her to call and make a lab only appointment    Kamini Raphael MA   September 20, 2018,  11:37 AM

## 2018-09-20 NOTE — TELEPHONE ENCOUNTER
Requested Prescriptions   Pending Prescriptions Disp Refills     vitamin D (ERGOCALCIFEROL) 04831 UNIT capsule [Pharmacy Med Name: VITAMIN D2 50,000IU (ERGO) CAP RX]        Last Written Prescription Date:  5/30/2018  Last Fill Quantity: 4,   # refills: 2  Last Office Visit: 6/22/2018  Future Office visit:       Routing refill request to provider for review/approval because:  Drug not on the [facility], P or  Health refill protocol or controlled substance   12 capsule 0     Sig: TAKE 1 CAPSULE BY MOUTH ONCE WEEKLY    There is no refill protocol information for this order

## 2018-09-20 NOTE — TELEPHONE ENCOUNTER
Routing refill request to provider for review/approval because:  Drug not on the [facility] refill protocol   Please advise if patient should have lab rechecked.    Vitamin D was prescribe by  provider.    Routing to last provider that has seen patient for annual px    nIgrid GRANGER RN, BSN, PHN  [place] [facility]

## 2018-09-25 DIAGNOSIS — E55.9 VITAMIN D DEFICIENCY: ICD-10-CM

## 2018-09-25 PROCEDURE — 36415 COLL VENOUS BLD VENIPUNCTURE: CPT | Performed by: PEDIATRICS

## 2018-09-25 PROCEDURE — 82306 VITAMIN D 25 HYDROXY: CPT | Performed by: PEDIATRICS

## 2018-09-27 LAB — DEPRECATED CALCIDIOL+CALCIFEROL SERPL-MC: 14 UG/L (ref 20–75)

## 2018-09-28 DIAGNOSIS — E55.9 VITAMIN D DEFICIENCY: Primary | ICD-10-CM

## 2018-12-06 NOTE — TELEPHONE ENCOUNTER
"Requested Prescriptions   Pending Prescriptions Disp Refills     D3-50 18528 units capsule [Pharmacy Med Name: VITAMIN D3 50,000 U  (CHOLECALCIFEROL)]    Last Written Prescription Date:  5/3/2018  Last Fill Quantity: 4,  # refills: 2   Last office visit: 4/12/2018 with prescribing provider:  Jose Daniel Arredondo     Future Office Visit:     8 capsule 0     Sig: TAKE 1 CAPSULE BY MOUTH ONCE A WEEK FOR 8 DOSES    Vitamin Supplements (Adult) Protocol Failed    12/6/2018 12:52 PM       Failed - High dose Vitamin D not ordered       Passed - Recent (12 mo) or future (30 days) visit within the authorizing provider's specialty    Patient had office visit in the last 12 months or has a visit in the next 30 days with authorizing provider or within the authorizing provider's specialty.  See \"Patient Info\" tab in inbasket, or \"Choose Columns\" in Meds & Orders section of the refill encounter.                "

## 2018-12-10 RX ORDER — METHOCARBAMOL 750 MG/1
TABLET ORAL
Qty: 8 CAPSULE | Refills: 0 | OUTPATIENT
Start: 2018-12-10

## 2018-12-11 NOTE — TELEPHONE ENCOUNTER
Called and left message to return call and schedule appointment due. Patient is due for lab only visit.    Geneva Gutierrez MA 9:10 AM 12/11/2018

## 2018-12-11 NOTE — TELEPHONE ENCOUNTER
Denied.  Patient due for lab recheck after completing 8 week course.  Please notify patient and assist in scheduling.  Future orders are in place  Marce ORNELAS RN - Triage  [facility]

## 2018-12-14 NOTE — TELEPHONE ENCOUNTER
Called and left message to return call and schedule appointment due. Patient is due for lab only visit.    Geneva Gutierrez MA 8:55 AM 12/14/2018

## 2018-12-19 NOTE — TELEPHONE ENCOUNTER
OK.      She can call [facility] - [place] [phone]   [email or web address]    Referral placed.    Vaccine Information Statement(s) for was given today. This has been reviewed, questions answered, and verbal consent given by Patient for injection(s) and administration of Pneumococcal Polysaccharide (PPSV).        Patient tolerated without incident. See immunization grid for documentation.

## 2019-01-14 DIAGNOSIS — E55.9 VITAMIN D DEFICIENCY: ICD-10-CM

## 2019-01-14 PROCEDURE — 36415 COLL VENOUS BLD VENIPUNCTURE: CPT | Performed by: PEDIATRICS

## 2019-01-14 PROCEDURE — 82306 VITAMIN D 25 HYDROXY: CPT | Performed by: PEDIATRICS

## 2019-01-15 LAB — DEPRECATED CALCIDIOL+CALCIFEROL SERPL-MC: 29 UG/L (ref 20–75)

## 2019-01-28 ENCOUNTER — OFFICE VISIT (OUTPATIENT)
Dept: FAMILY MEDICINE | Facility: CLINIC | Age: 44
End: 2019-01-28
Payer: COMMERCIAL

## 2019-01-28 VITALS
SYSTOLIC BLOOD PRESSURE: 113 MMHG | HEIGHT: 63 IN | HEART RATE: 71 BPM | RESPIRATION RATE: 16 BRPM | WEIGHT: 165 LBS | OXYGEN SATURATION: 99 % | BODY MASS INDEX: 29.23 KG/M2 | DIASTOLIC BLOOD PRESSURE: 53 MMHG | TEMPERATURE: 98.2 F

## 2019-01-28 DIAGNOSIS — G47.00 INSOMNIA, UNSPECIFIED TYPE: ICD-10-CM

## 2019-01-28 DIAGNOSIS — J00 CORYZA: Primary | ICD-10-CM

## 2019-01-28 PROCEDURE — 99214 OFFICE O/P EST MOD 30 MIN: CPT | Performed by: FAMILY MEDICINE

## 2019-01-28 RX ORDER — MOMETASONE FUROATE MONOHYDRATE 50 UG/1
2 SPRAY, METERED NASAL DAILY
Qty: 1 G | Refills: 0 | Status: SHIPPED | OUTPATIENT
Start: 2019-01-28 | End: 2019-02-07

## 2019-01-28 RX ORDER — IPRATROPIUM BROMIDE 42 UG/1
2 SPRAY, METERED NASAL 4 TIMES DAILY
Qty: 63 ML | Refills: 3 | Status: SHIPPED | OUTPATIENT
Start: 2019-01-28 | End: 2020-02-03

## 2019-01-28 RX ORDER — AZELASTINE 1 MG/ML
1 SPRAY, METERED NASAL 2 TIMES DAILY
Qty: 30 ML | Refills: 0 | Status: SHIPPED | OUTPATIENT
Start: 2019-01-28 | End: 2020-02-03

## 2019-01-28 ASSESSMENT — MIFFLIN-ST. JEOR: SCORE: 1372.57
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
## 2019-01-29 ENCOUNTER — TELEPHONE (OUTPATIENT)
Dept: FAMILY MEDICINE | Facility: CLINIC | Age: 44
End: 2019-01-29

## 2019-01-29 NOTE — TELEPHONE ENCOUNTER
The Nasonex sent in last night is not covered by pt's insurance.  Would you like to change medication or try for a PA?    Thanks!  Maribell Roa, Pharmacy [facility]  [phone]

## 2019-02-07 ENCOUNTER — OFFICE VISIT (OUTPATIENT)
Dept: FAMILY MEDICINE | Facility: CLINIC | Age: 44
End: 2019-02-07
Payer: COMMERCIAL

## 2019-02-07 ENCOUNTER — TRANSFERRED RECORDS (OUTPATIENT)
Dept: HEALTH INFORMATION MANAGEMENT | Facility: CLINIC | Age: 44
End: 2019-02-07

## 2019-02-07 VITALS
TEMPERATURE: 97.8 F | DIASTOLIC BLOOD PRESSURE: 70 MMHG | HEIGHT: 63 IN | BODY MASS INDEX: 29.06 KG/M2 | HEART RATE: 74 BPM | SYSTOLIC BLOOD PRESSURE: 110 MMHG | WEIGHT: 164 LBS | OXYGEN SATURATION: 99 % | RESPIRATION RATE: 12 BRPM

## 2019-02-07 DIAGNOSIS — F41.9 ANXIETY: Primary | ICD-10-CM

## 2019-02-07 PROCEDURE — 99213 OFFICE O/P EST LOW 20 MIN: CPT | Performed by: PHYSICIAN ASSISTANT

## 2019-02-07 RX ORDER — PREDNISONE 20 MG/1
20 TABLET ORAL
COMMUNITY
Start: 2019-02-06 | End: 2019-04-25

## 2019-02-07 ASSESSMENT — MIFFLIN-ST. JEOR: SCORE: 1368.03

## 2019-02-07 NOTE — PROGRESS NOTES
SUBJECTIVE:   Felicia Bermudez is a 43 year old female who presents to clinic today for the following health issues:    Gene Site testing.   Most recent treatment with Prozac. Has used: Buspar, sonata, trazodone, and Remeron as well.         Problem list and histories reviewed & adjusted, as indicated.  Additional history: as documented    Patient Active Problem List   Diagnosis     Urticaria     Multiple food allergies     PUD (peptic ulcer disease)     Abnormal Pap smear of cervix     High risk HPV infection     Abnormal weight gain     Insomnia, unspecified type     Intractable chronic migraine without aura and without status migrainosus     Menorrhagia with regular cycle     Anxiety     Vitamin D deficiency     Past Surgical History:   Procedure Laterality Date     DILATION AND CURETTAGE, HYSTEROSCOPY, ABLATE ENDOMETRIUM NOVASURE, COMBINED N/A 3/16/2018    Procedure: COMBINED DILATION AND CURETTAGE, HYSTEROSCOPY, ABLATE ENDOMETRIUM NOVASURE;  Hysteroscopy, Dilation and Curettage, unable to perform Endometrial Ablation with Novasure secondary to uterine perforation, diagnostic laparoscopy, Bilateral Laparoscopic Tubal Ligation ;  Surgeon: Danilo White MD;  Location: [facility]     EYE SURGERY      REmoval of mass left eye     LAPAROSCOPIC TUBAL LIGATION Bilateral 3/16/2018    Procedure: LAPAROSCOPIC TUBAL LIGATION;;  Surgeon: Danilo White MD;  Location: [facility]     LAPAROSCOPY DIAGNOSTIC (GYN)       SURGICAL HISTORY OF -       2010 turbinate surgery       Social History     Tobacco Use     Smoking status: Never Smoker     Smokeless tobacco: Never Used   Substance Use Topics     Alcohol use: No     Alcohol/week: 0.0 oz     Family History   Problem Relation Age of Onset     Neurologic Disorder Mother 57        brain anuerysm     Hypertension Father      Other Cancer Father 70        Testicle Cancer     Other - See Comments Daughter 21        rare blood disorders           Reviewed and updated as needed this  "visit by clinical staff       Reviewed and updated as needed this visit by Provider         ROS:  Constitutional, HEENT, cardiovascular, pulmonary, gi and gu systems are negative, except as otherwise noted.    OBJECTIVE:     /70 (BP Location: Right arm, Patient Position: Chair, Cuff Size: Adult Regular)   Pulse 74   Temp 97.8  F (36.6  C) (Oral)   Resp 12   Ht 1.6 m (5' 3\")   Wt 74.4 kg (164 lb)   SpO2 99%   BMI 29.05 kg/m    Body mass index is 29.05 kg/m .  GENERAL APPEARANCE: healthy, alert and no distress  PSYCH: mentation appears normal and affect normal/bright        ASSESSMENT/PLAN:             1. Anxiety  genesight ordered. F/u with Dr. Raza for results review.             Rosa M Aguiar PA-C  [facility]  "

## 2019-02-12 ENCOUNTER — TRANSFERRED RECORDS (OUTPATIENT)
Dept: HEALTH INFORMATION MANAGEMENT | Facility: CLINIC | Age: 44
End: 2019-02-12

## 2019-02-19 ENCOUNTER — EXTERNAL ORDER RESULTS (OUTPATIENT)
Dept: FAMILY MEDICINE | Facility: CLINIC | Age: 44
End: 2019-02-19

## 2019-02-19 NOTE — PROGRESS NOTES
GENE SIGHT  Trazodone levels too high  Pristiq, Fetzima, Viibryd as expected  seroquel levels too high  Braulio Raza

## 2019-02-22 ENCOUNTER — TELEPHONE (OUTPATIENT)
Dept: FAMILY MEDICINE | Facility: CLINIC | Age: 44
End: 2019-02-22

## 2019-02-22 NOTE — TELEPHONE ENCOUNTER
Yes, they are. They were given Dr. Raza it looks like he commented on them under external order results. Patient can set up f/u appt with Dr. Raza.     Rosa M Aguiar PA-C

## 2019-02-25 ENCOUNTER — OFFICE VISIT (OUTPATIENT)
Dept: FAMILY MEDICINE | Facility: CLINIC | Age: 44
End: 2019-02-25
Payer: COMMERCIAL

## 2019-02-25 ENCOUNTER — TELEPHONE (OUTPATIENT)
Dept: FAMILY MEDICINE | Facility: CLINIC | Age: 44
End: 2019-02-25

## 2019-02-25 VITALS
TEMPERATURE: 97.9 F | HEIGHT: 63 IN | DIASTOLIC BLOOD PRESSURE: 72 MMHG | BODY MASS INDEX: 29.06 KG/M2 | RESPIRATION RATE: 16 BRPM | WEIGHT: 164 LBS | HEART RATE: 76 BPM | SYSTOLIC BLOOD PRESSURE: 111 MMHG

## 2019-02-25 DIAGNOSIS — F41.9 ANXIETY: Primary | ICD-10-CM

## 2019-02-25 DIAGNOSIS — G47.00 INSOMNIA, UNSPECIFIED TYPE: ICD-10-CM

## 2019-02-25 PROCEDURE — 99213 OFFICE O/P EST LOW 20 MIN: CPT | Performed by: FAMILY MEDICINE

## 2019-02-25 RX ORDER — DESVENLAFAXINE 25 MG/1
25 TABLET, EXTENDED RELEASE ORAL DAILY
Qty: 15 TABLET | Refills: 3 | Status: SHIPPED | OUTPATIENT
Start: 2019-02-25 | End: 2019-03-21

## 2019-02-25 RX ORDER — DESVENLAFAXINE 50 MG/1
50 TABLET, FILM COATED, EXTENDED RELEASE ORAL DAILY
Qty: 30 TABLET | Refills: 1 | Status: SHIPPED | OUTPATIENT
Start: 2019-02-25 | End: 2019-04-29

## 2019-02-25 ASSESSMENT — MIFFLIN-ST. JEOR: SCORE: 1368.03

## 2019-02-25 NOTE — PROGRESS NOTES
"  SUBJECTIVE:   Felicia Bermudez is a 43 year old female who presents to clinic today for the following health issues:    Gene sight testing results      Anxiety  (primary encounter diagnosis)- gene sight testing reviewed.   Side effects  are increased.  Recommended pristiq, viibryd and fetzima    Insomnia, unspecified type-see previous notes about thoughts that cannot be silenced            Problem list and histories reviewed & adjusted, as indicated.  Additional history: none    Reviewed and updated as needed this visit by clinical staff  Tobacco  Allergies  Meds       Reviewed and updated as needed this visit by Provider               This document serves as a record of the services and decisions personally performed and made by Braulio Raza MD. It was created on his behalf by Moustapha Quintanilla, a trained medical scribe. The creation of this document is based on the provider's statements to the medical scribe.  Moustapha Quintanilla February 25, 2019 6:05 PM      OBJECTIVE:     /72 (BP Location: Right arm, Patient Position: Chair, Cuff Size: Adult Large)   Pulse 76   Temp 97.9  F (36.6  C) (Oral)   Resp 16   Ht 1.6 m (5' 3\")   Wt 74.4 kg (164 lb)   BMI 29.05 kg/m    Body mass index is 29.05 kg/m .      Diagnostic Test Results:  No results found for this or any previous visit (from the past 24 hour(s)).    ASSESSMENT/PLAN:   (F41.9) Anxiety  (primary encounter diagnosis)  Comment: This agent should act as anticipated  Plan: desvenlafaxine succinate (PRISTIQ) 25 MG 24 hr         tablet, desvenlafaxine (PRISTIQ) 50 MG 24 hr         tablet           (G47.00) Insomnia, unspecified type  Comment: Ideally this is well will be controlled  Plan: desvenlafaxine succinate (PRISTIQ) 25 MG 24 hr         tablet, desvenlafaxine (PRISTIQ) 50 MG 24 hr         tablet            Over 50 % of this visit 15 minute visit  spent in face to face counseling and coordination of care.        The information in this document, created by the " medical scribe for me, accurately reflects the services I personally performed and the decisions made by me. I have reviewed and approved this document for accuracy prior to leaving the patient care area.  February 25, 2019 6:05 PM      Braulio Raza MD  [facility]

## 2019-02-28 ENCOUNTER — TELEPHONE (OUTPATIENT)
Dept: FAMILY MEDICINE | Facility: CLINIC | Age: 44
End: 2019-02-28

## 2019-02-28 NOTE — TELEPHONE ENCOUNTER
CENTRAL PRIOR AUTHORIZATION  [phone]    PA Initiation    Medication: Pristiq 25 and 50 mg   Insurance Company: [employer] - Phone [phone] Fax [phone]  Pharmacy Filling the Rx: [place], [place] - [address]  Filling Pharmacy Phone: [phone]  Filling Pharmacy Fax:    Start Date: 2/28/2019

## 2019-02-28 NOTE — TELEPHONE ENCOUNTER
CENTRAL PRIOR AUTHORIZATION  [phone]    PA Initiation    Medication: desvenlafaxine (PRISTIQ) 50 MG 24 hr tablet - INITIATED 02/28/2019  Insurance Company: [employer] - Phone [phone] Fax [phone]  Pharmacy Filling the Rx: [place], [place] - [address]  Filling Pharmacy Phone: [phone]  Filling Pharmacy Fax:    Start Date: 2/28/2019

## 2019-03-01 NOTE — TELEPHONE ENCOUNTER
Prior Authorization Approval    Authorization Effective Date: 1/29/2019  Authorization Expiration Date: 2/28/2020  Medication: Pristiq 25 and 50 mg - BOTH STRENGTHS HAVE BEEN APPROVED 03/01/2019  Approved Dose/Quantity:   Reference #:     Insurance Company: [employer] - Phone [phone] Fax [phone]  Expected CoPay:       CoPay Card Available:      Foundation Assistance Needed:    Which Pharmacy is filling the prescription (Not needed for infusion/clinic administered): [facility] [address] [facility]  Pharmacy Notified: Yes  Patient Notified: Yes    I called [employer] and spoke with Renate from the PA department and since there also was a PA needed for the 50 mg strength, this was approved as well.  Once the drug gets approved due to it being a step therapy.  I called the pharamcy and both strengths went and paid through the insurance.

## 2019-03-01 NOTE — TELEPHONE ENCOUNTER
Prior Authorization Approval    Authorization Effective Date: 1/29/2019  Authorization Expiration Date: 2/28/2020  Medication: desvenlafaxine (PRISTIQ) 50 MG 24 hr tablet - APPROVED 02/28/2019  Approved Dose/Quantity: ONCE THE STEP THERAPY FORM WAS COMPLETED AND THIS WAS APPROVED, ALL STRENGTHS ARE PER [employer] FROM [facility]/[employer]  Reference #: CaseId:[id number]   Insurance Company: [employer] - Phone [phone] Fax [phone]  Expected CoPay:       CoPay Card Available:      Foundation Assistance Needed:    Which Pharmacy is filling the prescription (Not needed for infusion/clinic administered): [facility] [place], [place] - [address] [facility]  Pharmacy Notified: Yes  Patient Notified: Yes

## 2019-03-06 ENCOUNTER — TELEPHONE (OUTPATIENT)
Dept: FAMILY MEDICINE | Facility: CLINIC | Age: 44
End: 2019-03-06

## 2019-03-06 NOTE — TELEPHONE ENCOUNTER
Pt calls, started desvenlafaxine 25 mg 3/2/19, feels unsettled and trouble sleeping, discussed, recommend discuss with pharmacy if typical, BW out til 3/18, pt agrees, will continue and call if issues worsen, again recommend trouble shoot with pharmacy, pt agrees  Cady Pelayo RN, BSN  Message handled by Nurse Triage.

## 2019-03-18 ENCOUNTER — OFFICE VISIT (OUTPATIENT)
Dept: FAMILY MEDICINE | Facility: CLINIC | Age: 44
End: 2019-03-18
Payer: COMMERCIAL

## 2019-03-18 VITALS
HEIGHT: 63 IN | SYSTOLIC BLOOD PRESSURE: 107 MMHG | WEIGHT: 164 LBS | RESPIRATION RATE: 16 BRPM | HEART RATE: 83 BPM | BODY MASS INDEX: 29.06 KG/M2 | DIASTOLIC BLOOD PRESSURE: 71 MMHG | TEMPERATURE: 98.6 F

## 2019-03-18 DIAGNOSIS — R10.13 DYSPEPSIA: ICD-10-CM

## 2019-03-18 DIAGNOSIS — G43.719 INTRACTABLE CHRONIC MIGRAINE WITHOUT AURA AND WITHOUT STATUS MIGRAINOSUS: ICD-10-CM

## 2019-03-18 DIAGNOSIS — F41.9 ANXIETY: Primary | ICD-10-CM

## 2019-03-18 DIAGNOSIS — L50.9 URTICARIA: ICD-10-CM

## 2019-03-18 PROCEDURE — 99214 OFFICE O/P EST MOD 30 MIN: CPT | Performed by: FAMILY MEDICINE

## 2019-03-18 RX ORDER — PANTOPRAZOLE SODIUM 40 MG/1
40 TABLET, DELAYED RELEASE ORAL DAILY PRN
Qty: 30 TABLET | Refills: 0 | Status: SHIPPED | OUTPATIENT
Start: 2019-03-18 | End: 2019-11-08

## 2019-03-18 RX ORDER — HYDROXYZINE HYDROCHLORIDE 25 MG/1
25 TABLET, FILM COATED ORAL EVERY 4 HOURS PRN
Qty: 30 TABLET | Refills: 11 | Status: SHIPPED | OUTPATIENT
Start: 2019-03-18 | End: 2020-07-03

## 2019-03-18 ASSESSMENT — MIFFLIN-ST. JEOR: SCORE: 1368.03

## 2019-03-18 NOTE — PROGRESS NOTES
"  SUBJECTIVE:   Felicia Bermudez is a 43 year old female who presents to clinic today for the following health issues:      Medication Followup of Pristiq    Taking Medication as prescribed: yes    Side Effects:  Tension,Sleep,GI,headache     Medication Helping Symptoms:  yes         Patient is feeling better.  Her heart will pound some nights. Sometimes feels tense.  Mood is more calm..  Her \"brain fog\" is gone.      hydroxyzine for  seasonal hives. pruritis      Stomach feels \"acidic.'  Would like protonix  History\" PUD\" diagnostic tests unclear. Endoscopy unlikely          Problem list and histories reviewed & adjusted, as indicated.  Additional history: as documented    Past Medical History:   Diagnosis Date     Abnormal weight gain 2/28/2017     Anxiety 6/9/2018     Arthritis      High risk HPV infection 08/03/15    NIL, +HPV 18, plan colp     History of colposcopy 11/16/15    No bx taken     Hives     multiple allergies - food, environmental     Insomnia        Past Surgical History:   Procedure Laterality Date     DILATION AND CURETTAGE, HYSTEROSCOPY, ABLATE ENDOMETRIUM NOVASURE, COMBINED N/A 3/16/2018    Procedure: COMBINED DILATION AND CURETTAGE, HYSTEROSCOPY, ABLATE ENDOMETRIUM NOVASURE;  Hysteroscopy, Dilation and Curettage, unable to perform Endometrial Ablation with Novasure secondary to uterine perforation, diagnostic laparoscopy, Bilateral Laparoscopic Tubal Ligation ;  Surgeon: Danilo White MD;  Location: [facility] OR     EYE SURGERY      REmoval of mass left eye     LAPAROSCOPIC TUBAL LIGATION Bilateral 3/16/2018    Procedure: LAPAROSCOPIC TUBAL LIGATION;;  Surgeon: Danilo White MD;  Location: [facility] OR     LAPAROSCOPY DIAGNOSTIC (GYN)       SURGICAL HISTORY OF -       2010 turbinate surgery       Family History   Problem Relation Age of Onset     Neurologic Disorder Mother 57        brain anuerysm     Hypertension Father      Other Cancer Father 70        Testicle Cancer     Other - See Comments " "Daughter 21        rare blood disorders       Social History     Tobacco Use     Smoking status: Never Smoker     Smokeless tobacco: Never Used   Substance Use Topics     Alcohol use: No     Alcohol/week: 0.0 oz         Reviewed and updated as needed this visit by clinical staff  Tobacco  Allergies  Meds  Med Hx  Surg Hx  Fam Hx  Soc Hx      Reviewed and updated as needed this visit by Provider         ROS:  She is delighted No dysphagia, fever No migraines    This document serves as a record of the services and decisions personally performed and made by Braulio Raza MD. It was created on his behalf by Moustapha Quintanilla, a trained medical scribe. The creation of this document is based on the provider's statements to the medical scribe.  Moustapha Quintanilla March 18, 2019 5:06 PM      OBJECTIVE:     /71 (BP Location: Right arm, Patient Position: Chair, Cuff Size: Adult Large)   Pulse 83   Temp 98.6  F (37  C) (Oral)   Resp 16   Ht 1.6 m (5' 3\")   Wt 74.4 kg (164 lb)   Breastfeeding? No   BMI 29.05 kg/m    Body mass index is 29.05 kg/m .      Affect ebullient    ASSESSMENT/PLAN:     ASSESSMENT / PLAN:  (L50.9) Urticaria  (  Comment: she may use  Plan: hydrOXYzine (ATARAX) 25 MG tablet        By hx    (R10.13) Dyspepsia  Comment: daily famotidine (pepcid) 20 or ranitidine(zantac) 150   Plan: ranitidine (ZANTAC) 300 MG tablet, pantoprazole        (PROTONIX) 40 MG EC tablet        As needed    (F41.9) Anxiety primary encounter diagnosis)  Comment: still on pristiq 25  Plan:increase to 50 as planned    (G43.719) Intractable chronic migraine without aura and without status migrainosus  Comment: responsive  Plan: continue          She is hesitant to increase pristiq, use ranitidine. She will call in 2 weeks ifnot improving, we can adjust by phone. Otherwise, 4 weeks visit          Braulio Raza MD              The information in this document, created by the medical scribe for me, accurately reflects the services I " personally performed and the decisions made by me. I have reviewed and approved this document for accuracy prior to leaving the patient care area.  March 18, 2019 5:06 PM      Braulio Raza MD  [facility]

## 2019-04-02 ENCOUNTER — TELEPHONE (OUTPATIENT)
Dept: FAMILY MEDICINE | Facility: CLINIC | Age: 44
End: 2019-04-02

## 2019-04-02 NOTE — TELEPHONE ENCOUNTER
Patient calling and [facility] wants to let Dr. Raza know she is still having side effects from Desvenlafaxine 50 mg.  Having headaches, fatigue, trouble focusing/concnetrating and heart racing per patient.  [facility] has been on the Desvenlafaxine 50 mg dose for 2 weeks.  [facility] had issues for about a week with the Desvenlafaxine 25 mg dose but that the side effects went away.  [facility] side effects are too much.  Please advise.  Call her back at [phone].  Rebecca Marcelo RN

## 2019-04-02 NOTE — TELEPHONE ENCOUNTER
Taper off desvenlafaxine  She has refills of the 25: take 15 daily, then quit  See MD 2 weeks post  Braulio Raza

## 2019-04-03 NOTE — TELEPHONE ENCOUNTER
Called patient and advised of below.  Patient agrees with plan.  Transferred to pharmacy for refill.  Rebecca Marcelo RN

## 2019-04-25 ENCOUNTER — OFFICE VISIT (OUTPATIENT)
Dept: INTERNAL MEDICINE | Facility: CLINIC | Age: 44
End: 2019-04-25
Payer: COMMERCIAL

## 2019-04-25 VITALS
HEART RATE: 80 BPM | DIASTOLIC BLOOD PRESSURE: 64 MMHG | BODY MASS INDEX: 29.15 KG/M2 | OXYGEN SATURATION: 100 % | HEIGHT: 63 IN | TEMPERATURE: 98.4 F | RESPIRATION RATE: 16 BRPM | SYSTOLIC BLOOD PRESSURE: 108 MMHG | WEIGHT: 164.5 LBS

## 2019-04-25 DIAGNOSIS — Z00.00 HEALTHCARE MAINTENANCE: Primary | ICD-10-CM

## 2019-04-25 LAB
ERYTHROCYTE [DISTWIDTH] IN BLOOD BY AUTOMATED COUNT: 16.7 % (ref 10–15)
HCT VFR BLD AUTO: 29.6 % (ref 35–47)
HGB BLD-MCNC: 9.1 G/DL (ref 11.7–15.7)
MCH RBC QN AUTO: 21.2 PG (ref 26.5–33)
MCHC RBC AUTO-ENTMCNC: 30.7 G/DL (ref 31.5–36.5)
MCV RBC AUTO: 69 FL (ref 78–100)
PLATELET # BLD AUTO: 234 10E9/L (ref 150–450)
RBC # BLD AUTO: 4.3 10E12/L (ref 3.8–5.2)
WBC # BLD AUTO: 6.7 10E9/L (ref 4–11)

## 2019-04-25 PROCEDURE — 80061 LIPID PANEL: CPT | Performed by: NURSE PRACTITIONER

## 2019-04-25 PROCEDURE — 84443 ASSAY THYROID STIM HORMONE: CPT | Performed by: NURSE PRACTITIONER

## 2019-04-25 PROCEDURE — 99396 PREV VISIT EST AGE 40-64: CPT | Performed by: NURSE PRACTITIONER

## 2019-04-25 PROCEDURE — 36415 COLL VENOUS BLD VENIPUNCTURE: CPT | Performed by: NURSE PRACTITIONER

## 2019-04-25 PROCEDURE — 85027 COMPLETE CBC AUTOMATED: CPT | Performed by: NURSE PRACTITIONER

## 2019-04-25 PROCEDURE — 80048 BASIC METABOLIC PNL TOTAL CA: CPT | Performed by: NURSE PRACTITIONER

## 2019-04-25 ASSESSMENT — ENCOUNTER SYMPTOMS
ARTHRALGIAS: 1
PARESTHESIAS: 0
SORE THROAT: 0
BREAST MASS: 1
DIARRHEA: 0
ABDOMINAL PAIN: 0
HEMATOCHEZIA: 0
SHORTNESS OF BREATH: 0
CHILLS: 0
DIZZINESS: 0
DYSURIA: 0
FREQUENCY: 1
EYE PAIN: 0
COUGH: 0
FEVER: 0
PALPITATIONS: 0
HEMATURIA: 0
HEARTBURN: 0
NERVOUS/ANXIOUS: 0
CONSTIPATION: 1
MYALGIAS: 1
NAUSEA: 0
JOINT SWELLING: 0
HEADACHES: 1
WEAKNESS: 0

## 2019-04-25 ASSESSMENT — MIFFLIN-ST. JEOR: SCORE: 1362.36

## 2019-04-25 NOTE — PROGRESS NOTES
SUBJECTIVE:   CC: Felicia Bermudez is an 43 year old woman who presents for preventive health visit.         Healthy Habits:     Getting at least 3 servings of Calcium per day:  Yes    Bi-annual eye exam:  Yes    Dental care twice a year:  Yes    Sleep apnea or symptoms of sleep apnea:  None    Diet:  Regular (no restrictions)    Frequency of exercise:  4-5 days/week    Duration of exercise:  45-60 minutes    Taking medications regularly:  Yes    Medication side effects:  Not applicable    PHQ-2 Total Score: 0    Additional concerns today:  No              Today's PHQ-2 Score:   PHQ-2 ( 1999 [employer]) 4/25/2019   Q1: Little interest or pleasure in doing things 0   Q2: Feeling down, depressed or hopeless 0   PHQ-2 Score 0   Q1: Little interest or pleasure in doing things Not at all   Q2: Feeling down, depressed or hopeless Not at all   PHQ-2 Score 0       Abuse: Current or Past(Physical, Sexual or Emotional)- No  Do you feel safe in your environment? Yes    Social History     Tobacco Use     Smoking status: Never Smoker     Smokeless tobacco: Never Used   Substance Use Topics     Alcohol use: No     Alcohol/week: 0.0 oz     If you drink alcohol do you typically have >3 drinks per day or >7 drinks per week? No    Alcohol Use 4/25/2019   Prescreen: >3 drinks/day or >7 drinks/week? Not Applicable   Prescreen: >3 drinks/day or >7 drinks/week? -       Reviewed orders with patient.  Reviewed health maintenance and updated orders accordingly - Yes  BP Readings from Last 3 Encounters:   04/25/19 108/64   03/18/19 107/71   02/25/19 111/72    Wt Readings from Last 3 Encounters:   04/25/19 74.6 kg (164 lb 8 oz)   03/18/19 74.4 kg (164 lb)   02/25/19 74.4 kg (164 lb)                  Patient Active Problem List   Diagnosis     Urticaria     Multiple food allergies     PUD (peptic ulcer disease)     Abnormal Pap smear of cervix     High risk HPV infection     Abnormal weight gain     Insomnia, unspecified type     Intractable  chronic migraine without aura and without status migrainosus     Menorrhagia with regular cycle     Anxiety     Vitamin D deficiency     Dyspepsia     Past Surgical History:   Procedure Laterality Date     DILATION AND CURETTAGE, HYSTEROSCOPY, ABLATE ENDOMETRIUM NOVASURE, COMBINED N/A 3/16/2018    Procedure: COMBINED DILATION AND CURETTAGE, HYSTEROSCOPY, ABLATE ENDOMETRIUM NOVASURE;  Hysteroscopy, Dilation and Curettage, unable to perform Endometrial Ablation with Novasure secondary to uterine perforation, diagnostic laparoscopy, Bilateral Laparoscopic Tubal Ligation ;  Surgeon: Danilo White MD;  Location: [facility] OR     EYE SURGERY      REmoval of mass left eye     LAPAROSCOPIC TUBAL LIGATION Bilateral 3/16/2018    Procedure: LAPAROSCOPIC TUBAL LIGATION;;  Surgeon: Danilo White MD;  Location: [facility] OR     LAPAROSCOPY DIAGNOSTIC (GYN)       SURGICAL HISTORY OF -       2010 turbinate surgery       Social History     Tobacco Use     Smoking status: Never Smoker     Smokeless tobacco: Never Used   Substance Use Topics     Alcohol use: No     Alcohol/week: 0.0 oz     Family History   Problem Relation Age of Onset     Neurologic Disorder Mother 57        brain anuerysm     Hypertension Father      Other Cancer Father 70        Testicle Cancer     Other - See Comments Daughter 21        rare blood disorders     Polycythemia Daughter          Current Outpatient Medications   Medication Sig Dispense Refill     azelastine (ASTELIN) 0.1 % nasal spray Spray 1 spray into both nostrils 2 times daily 30 mL 0     desvenlafaxine (PRISTIQ) 50 MG 24 hr tablet Take 1 tablet (50 mg) by mouth daily Use second 30 tablet 1     hydrOXYzine (ATARAX) 25 MG tablet Take 1 tablet (25 mg) by mouth every 4 hours as needed For hives 30 tablet 11     IBUPROFEN PO Take 400 mg by mouth every 6 hours as needed        ipratropium (ATROVENT) 0.06 % nasal spray Spray 2 sprays into both nostrils 4 times daily 63 mL 3     Multiple Vitamins-Minerals  "(MULTIVITAMIN OR)        pantoprazole (PROTONIX) 40 MG EC tablet Take 1 tablet (40 mg) by mouth daily as needed for heartburn 30 tablet 0       Mammogram not appropriate for this patient based on age.    Pertinent mammograms are reviewed under the imaging tab.  History of abnormal Pap smear: NO - age 30- 65 PAP every 3 years recommended  PAP / HPV Latest Ref Rng & Units 1/13/2017 11/16/2015 11/16/2015   PAP - NIL - -   HPV 16 DNA NEG Negative Negative Negative   HPV 18 DNA NEG Negative Negative Negative   OTHER HR HPV NEG Negative Negative Negative     Reviewed and updated as needed this visit by clinical staff  Tobacco  Allergies  Med Hx  Surg Hx  Fam Hx  Soc Hx        Reviewed and updated as needed this visit by Provider            Review of Systems   Constitutional: Negative for chills and fever.   HENT: Positive for congestion. Negative for ear pain, hearing loss and sore throat.    Eyes: Negative for pain and visual disturbance.   Respiratory: Negative for cough and shortness of breath.    Cardiovascular: Negative for chest pain, palpitations and peripheral edema.   Gastrointestinal: Positive for constipation. Negative for abdominal pain, diarrhea, heartburn, hematochezia and nausea.   Breasts:  Positive for breast mass. Negative for tenderness and discharge.   Genitourinary: Positive for frequency. Negative for dysuria, genital sores, hematuria, pelvic pain, urgency, vaginal bleeding and vaginal discharge.   Musculoskeletal: Positive for arthralgias and myalgias. Negative for joint swelling.   Neurological: Positive for headaches. Negative for dizziness, weakness and paresthesias.   Psychiatric/Behavioral: Negative for mood changes. The patient is not nervous/anxious.           OBJECTIVE:   /64 (BP Location: Right arm, Patient Position: Chair, Cuff Size: Adult Large)   Pulse 80   Temp 98.4  F (36.9  C) (Oral)   Resp 16   Ht 1.588 m (5' 2.5\")   Wt 74.6 kg (164 lb 8 oz)   SpO2 100%   BMI 29.61 " "kg/m    Physical Exam  GENERAL: healthy, alert and no distress  NECK: no adenopathy, no asymmetry, masses, or scars and thyroid normal to palpation  RESP: lungs clear to auscultation - no rales, rhonchi or wheezes  CV: regular rate and rhythm, normal S1 S2, no S3 or S4, no murmur, click or rub, no peripheral edema and peripheral pulses strong  ABDOMEN: soft, nontender, no hepatosplenomegaly, no masses and bowel sounds normal  MS: no gross musculoskeletal defects noted, no edema  NEURO: Normal strength and tone, mentation intact and speech normal  PSYCH: mentation appears normal, affect normal/bright        ASSESSMENT/PLAN:       ICD-10-CM    1. Healthcare maintenance Z00.00 CBC with platelets     Basic metabolic panel     TSH     Lipid panel reflex to direct LDL Non-fasting     CANCELED: Lipid panel reflex to direct LDL Fasting       COUNSELING:  Reviewed preventive health counseling, as reflected in patient instructions    BP Readings from Last 1 Encounters:   04/25/19 108/64     Estimated body mass index is 29.61 kg/m  as calculated from the following:    Height as of this encounter: 1.588 m (5' 2.5\").    Weight as of this encounter: 74.6 kg (164 lb 8 oz).      Weight management plan: Discussed healthy diet and exercise guidelines     reports that she has never smoked. She has never used smokeless tobacco.      Counseling Resources:  ATP IV Guidelines  Pooled Cohorts Equation Calculator  Breast Cancer Risk Calculator  FRAX Risk Assessment  ICSI Preventive Guidelines  Dietary Guidelines for Americans, 2010  USDA's MyPlate  ASA Prophylaxis  Lung CA Screening    Karen Castellano NP  [facility]  "

## 2019-04-26 LAB
ANION GAP SERPL CALCULATED.3IONS-SCNC: 9 MMOL/L (ref 3–14)
BUN SERPL-MCNC: 8 MG/DL (ref 7–30)
CALCIUM SERPL-MCNC: 8.7 MG/DL (ref 8.5–10.1)
CHLORIDE SERPL-SCNC: 107 MMOL/L (ref 94–109)
CHOLEST SERPL-MCNC: 187 MG/DL
CO2 SERPL-SCNC: 24 MMOL/L (ref 20–32)
CREAT SERPL-MCNC: 0.75 MG/DL (ref 0.52–1.04)
GFR SERPL CREATININE-BSD FRML MDRD: >90 ML/MIN/{1.73_M2}
GLUCOSE SERPL-MCNC: 86 MG/DL (ref 70–99)
HDLC SERPL-MCNC: 48 MG/DL
LDLC SERPL CALC-MCNC: 120 MG/DL
NONHDLC SERPL-MCNC: 135 MG/DL
POTASSIUM SERPL-SCNC: 4.1 MMOL/L (ref 3.4–5.3)
SODIUM SERPL-SCNC: 140 MMOL/L (ref 133–144)
TRIGL SERPL-MCNC: 77 MG/DL
TSH SERPL DL<=0.005 MIU/L-ACNC: 1.8 MU/L (ref 0.4–4)

## 2019-04-29 ENCOUNTER — OFFICE VISIT (OUTPATIENT)
Dept: FAMILY MEDICINE | Facility: CLINIC | Age: 44
End: 2019-04-29
Payer: COMMERCIAL

## 2019-04-29 VITALS
TEMPERATURE: 98.4 F | HEIGHT: 63 IN | DIASTOLIC BLOOD PRESSURE: 77 MMHG | RESPIRATION RATE: 16 BRPM | HEART RATE: 72 BPM | WEIGHT: 163 LBS | SYSTOLIC BLOOD PRESSURE: 127 MMHG | BODY MASS INDEX: 28.88 KG/M2

## 2019-04-29 DIAGNOSIS — I88.9 ADENITIS: ICD-10-CM

## 2019-04-29 DIAGNOSIS — G43.909 MIGRAINE WITHOUT STATUS MIGRAINOSUS, NOT INTRACTABLE, UNSPECIFIED MIGRAINE TYPE: ICD-10-CM

## 2019-04-29 DIAGNOSIS — F41.9 ANXIETY: Primary | ICD-10-CM

## 2019-04-29 PROBLEM — G43.719 INTRACTABLE CHRONIC MIGRAINE WITHOUT AURA AND WITHOUT STATUS MIGRAINOSUS: Status: RESOLVED | Noted: 2017-04-17 | Resolved: 2019-04-29

## 2019-04-29 PROCEDURE — 99214 OFFICE O/P EST MOD 30 MIN: CPT | Performed by: FAMILY MEDICINE

## 2019-04-29 RX ORDER — DESVENLAFAXINE 25 MG/1
25 TABLET, EXTENDED RELEASE ORAL DAILY
Qty: 90 TABLET | Refills: 1 | Status: SHIPPED | OUTPATIENT
Start: 2019-04-29 | End: 2019-11-08

## 2019-04-29 ASSESSMENT — MIFFLIN-ST. JEOR: SCORE: 1355.55

## 2019-04-29 NOTE — PROGRESS NOTES
"  SUBJECTIVE:   Felicia Bermudez is a 43 year old female who presents to clinic today for the following health issues:      HPI  Medication Followup of Pristiq    Taking Medication as prescribed: yes    Side Effects:  None    Medication Helping Symptoms:  yes     Additional history: as documented    Reviewed and updated as needed this visit by clinical staff  Tobacco  Allergies  Meds  Med Hx  Surg Hx  Fam Hx  Soc Hx        Reviewed and updated as needed this visit by Provider       Initial complaints of Insomnia, difficulty concentrating fatigue  Initial Pristiq 25 \"unsettled\" insomnia, then Later \"brain fog gone\" palpitations \"acidic\" stomach  Increased to   Pristiq 50, c/o  HA fatigue trouble concentrating, palpitations, insisted on stopping      Anxiety  (primary encounter diagnosis) numerous med failures  Intractable chronic migraine without aura and without status migrainosus now absent    She also complains of a few days discomfort in her right anterior neck just below the angle of the jaw.  PAST MEDICAL HISTORY:   Past Medical History:   Diagnosis Date     Abnormal weight gain 2/28/2017     Anxiety 6/9/2018     Arthritis      Dyspepsia 3/18/2019     High risk HPV infection 08/03/15    NIL, +HPV 18, plan colp     History of colposcopy 11/16/15    No bx taken     Hives     multiple allergies - food, environmental     Insomnia        PAST SURGICAL HISTORY:   Past Surgical History:   Procedure Laterality Date     DILATION AND CURETTAGE, HYSTEROSCOPY, ABLATE ENDOMETRIUM NOVASURE, COMBINED N/A 3/16/2018    Procedure: COMBINED DILATION AND CURETTAGE, HYSTEROSCOPY, ABLATE ENDOMETRIUM NOVASURE;  Hysteroscopy, Dilation and Curettage, unable to perform Endometrial Ablation with Novasure secondary to uterine perforation, diagnostic laparoscopy, Bilateral Laparoscopic Tubal Ligation ;  Surgeon: Danilo White MD;  Location: [facility] OR     EYE SURGERY      REmoval of mass left eye     LAPAROSCOPIC TUBAL LIGATION " "Bilateral 3/16/2018    Procedure: LAPAROSCOPIC TUBAL LIGATION;;  Surgeon: Danilo White MD;  Location: [facility] OR     LAPAROSCOPY DIAGNOSTIC (GYN)       SURGICAL HISTORY OF -       2010 turbinate surgery       FAMILY HISTORY:   Family History   Problem Relation Age of Onset     Neurologic Disorder Mother 57        brain anuerysm     Hypertension Father      Other Cancer Father 70        Testicle Cancer     Other - See Comments Daughter 21        rare blood disorders     Polycythemia Daughter      Dtr with \"rare blood disease\" cared for at [facility], cost unbearable  SOCIAL HISTORY:   Social History     Tobacco Use     Smoking status: Never Smoker     Smokeless tobacco: Never Used   Substance Use Topics     Alcohol use: No     Alcohol/week: 0.0 oz     Insurance coverage lapses in a few days    ROS:  No HA, still insomnia.  No sore throat no otalgia no skin rash no systemic symptoms    This document serves as a record of the services and decisions personally performed and made by Braulio Raza MD. It was created on his behalf by Emi Ballard, a trained medical scribe. The creation of this document is based on the provider's statements to the medical scribe.  Emi Ballard April 29, 2019 4:57 PM      OBJECTIVE:     /77 (BP Location: Right arm, Patient Position: Chair, Cuff Size: Adult Large)   Pulse 72   Temp 98.4  F (36.9  C) (Oral)   Resp 16   Ht 1.588 m (5' 2.5\")   Wt 73.9 kg (163 lb)   Breastfeeding? No   BMI 29.34 kg/m    Body mass index is 29.34 kg/m .  GENERAL: healthy, alert   PSYCH: mentation appears normal, affect normal/bright  Shotty adenitis at the right angle of the jaw.  No prominent nodes no other findings.  This is the area of discomfort    ASSESSMENT/PLAN:   ASSESSMENT / PLAN:  (F41.9) Anxiety  (primary encounter diagnosis)  Comment: she is attributing some symptoms of her illness to her meds  Plan: desvenlafaxine succinate (PRISTIQ) 25 MG 24 hr         tablet       Until new " insurance    (G43.631) Intractable chronic migraine without aura and without status migrainosus  Comment: no longer intractable  Plan: desvenlafaxine succinate (PRISTIQ) 25 MG 24 hr         tablet        In fact absent    (G43.901) Migraine without status migrainosus, not intractable, unspecified migraine type  Comment: I attribute this to pristiq effect  Plan:     Regarding her adenitis, reassurance.  Monitor    Braulio Raza MD        The information in this document, created by the medical scribe for me, accurately reflects the services I personally performed and the decisions made by me. I have reviewed and approved this document for accuracy prior to leaving the patient care area.  April 29, 2019 4:42 PM      Braulio Raza MD  [facility]

## 2019-04-30 ENCOUNTER — TELEPHONE (OUTPATIENT)
Dept: INTERNAL MEDICINE | Facility: CLINIC | Age: 44
End: 2019-04-30

## 2019-04-30 DIAGNOSIS — D64.9 ANEMIA, UNSPECIFIED TYPE: Primary | ICD-10-CM

## 2019-04-30 PROBLEM — I88.9 ADENITIS: Status: ACTIVE | Noted: 2019-04-30

## 2019-04-30 NOTE — TELEPHONE ENCOUNTER
Patient advised of results and medication change. Patient states that her insurance is ending today and will call back to schedule when she gets new insurance.  LACEY ROJAS,CMA

## 2019-04-30 NOTE — TELEPHONE ENCOUNTER
Please advise pt Hgb low, 9.1.  Orders sent for iron studies in lab.  Then start OTC ferrous sulfate 325 mg daily x 1 month.  Cholesterol mildly elevated.  Karen Castellano CNP

## 2019-07-02 DIAGNOSIS — L50.9 URTICARIA, UNSPECIFIED: ICD-10-CM

## 2019-07-02 RX ORDER — HYDROXYZINE HYDROCHLORIDE 25 MG/1
TABLET, FILM COATED ORAL
Qty: 60 TABLET | Refills: 1 | OUTPATIENT
Start: 2019-07-02

## 2019-07-02 NOTE — TELEPHONE ENCOUNTER
Not due for a refill.     hydrOXYzine (ATARAX) 25 MG tablet was filled on 3/18/2019, qty 30 with 11 refills.

## 2019-07-03 NOTE — TELEPHONE ENCOUNTER
Refused refill as patient has refills available. Refilled on 3/18/19 for 30 day supply with 11 refills. Message sent to pharmacy.    Eloisa Suh, RN, BSN

## 2019-07-12 ENCOUNTER — TELEPHONE (OUTPATIENT)
Dept: FAMILY MEDICINE | Facility: CLINIC | Age: 44
End: 2019-07-12

## 2019-07-12 NOTE — TELEPHONE ENCOUNTER
Reason for Call:  Same Day Appointment, Requested Provider:  Dr. Raza     PCP: Jose Daniel Ryan    Reason for visit: re ck meds- Prestiq    Duration of symptoms: NA     Have you been treated for this in the past? Yes    Additional comments: Pt is hoping you can work her in before next available which is 8/6/19. She states it is not working and she would like it changed. Cannot relax, stiffness in her body. Body pain & aches. Thomas and emotional. Please call.     Can we leave a detailed message on this number? YES    Phone number patient can be reached at: Home number on file [phone] (home)    Best Time: any     Call taken on 7/12/2019 at 3:12 PM by Brittany Reardon

## 2019-07-16 NOTE — TELEPHONE ENCOUNTER
She can stop the pristiq at this dose. At last note, she did not have insurance, so there was cost concerns. Does she now?  At any rate, stopping the pristiq now and appt early August is quite reasonable, let the pristiq out of her system.  Braulio Raza

## 2019-07-16 NOTE — TELEPHONE ENCOUNTER
Pt returned call, given message below. She will stop pristiq and does have insurance now, she will call again to schedule an appointment for August.     Yakov Staton   07/16/19 3:42 PM

## 2019-07-16 NOTE — TELEPHONE ENCOUNTER
Phone call to patient no answer, message left on voicemail to return call to clinic triage nurse     Cecelia Hutchison, Registered Nurse   [facility]

## 2019-07-18 DIAGNOSIS — D64.9 ANEMIA, UNSPECIFIED TYPE: ICD-10-CM

## 2019-07-18 LAB — FOLATE SERPL-MCNC: 13 NG/ML

## 2019-07-18 PROCEDURE — 83540 ASSAY OF IRON: CPT | Performed by: NURSE PRACTITIONER

## 2019-07-18 PROCEDURE — 82746 ASSAY OF FOLIC ACID SERUM: CPT | Performed by: NURSE PRACTITIONER

## 2019-07-18 PROCEDURE — 83550 IRON BINDING TEST: CPT | Performed by: NURSE PRACTITIONER

## 2019-07-18 PROCEDURE — 82728 ASSAY OF FERRITIN: CPT | Performed by: NURSE PRACTITIONER

## 2019-07-18 PROCEDURE — 36415 COLL VENOUS BLD VENIPUNCTURE: CPT | Performed by: NURSE PRACTITIONER

## 2019-07-19 LAB
FERRITIN SERPL-MCNC: 8 NG/ML (ref 12–150)
IRON SATN MFR SERPL: 8 % (ref 15–46)
IRON SERPL-MCNC: 27 UG/DL (ref 35–180)
TIBC SERPL-MCNC: 350 UG/DL (ref 240–430)

## 2019-07-22 ENCOUNTER — TELEPHONE (OUTPATIENT)
Dept: INTERNAL MEDICINE | Facility: CLINIC | Age: 44
End: 2019-07-22

## 2019-07-22 NOTE — TELEPHONE ENCOUNTER
Please notify pt low iron levels confirmed, start OTC ferrous sulfate 325 mg BID x 1 month and recheck Hgb.  Karen Castellano CNP

## 2019-07-23 NOTE — TELEPHONE ENCOUNTER
Patient called back. Message given. Patient states that Ferrous sulfate upsets her stomach. Patient wants to know what else she can take. Patient states she has very heavy periods and last about 7-8 days and heavy bleeding the entire time. Patient wonders if this is her problem.     Please call and ok to christi [phone]

## 2019-07-23 NOTE — TELEPHONE ENCOUNTER
Please notify pt heavy periods very likely to cause anemia.  Take iron with food or at bedtime.  Karen Castellano CNP

## 2019-07-23 NOTE — TELEPHONE ENCOUNTER
Called patient, advised her heavy periods are likely causing her anemia.   Patient has seen Ob-Gyn for heavy periods and they have tried everything they can, but nothing has helped.    Advised patient that there is not another medication Karen recommends, but she should take the iron with food or at bedtime. Patient has tried taking with food and at bedtime, but food didn't help and upset stomach keeps her awake at night if she takes at bedtime.    Patient states she was previously taking 50 mg of iron a day. Advised patient she may have been taking a different form of Iron. Karen recommends Ferrous Sulfate 325 mg, patient states she will look for this at the pharmacy to see if it is different.

## 2019-09-24 ENCOUNTER — ANCILLARY PROCEDURE (OUTPATIENT)
Dept: MAMMOGRAPHY | Facility: CLINIC | Age: 44
End: 2019-09-24
Payer: COMMERCIAL

## 2019-09-24 DIAGNOSIS — Z12.31 VISIT FOR SCREENING MAMMOGRAM: ICD-10-CM

## 2019-09-24 PROCEDURE — 77067 SCR MAMMO BI INCL CAD: CPT | Mod: TC

## 2019-11-07 ENCOUNTER — TELEPHONE (OUTPATIENT)
Dept: FAMILY MEDICINE | Facility: CLINIC | Age: 44
End: 2019-11-07

## 2019-11-07 NOTE — TELEPHONE ENCOUNTER
LMTCB-    Needs to be seen.   Instructions   Return in about 3 months (around 7/29/2019).     For medication changes can do a telephone visit but is also do for a follow from April 2019 visit.     Peggy Mays RN Flex

## 2019-11-07 NOTE — TELEPHONE ENCOUNTER
Pt called and asked if she could be prescribed a different medication.  She said that desvenlafaxine succinate (PRISTIQ) 25 MG 24 hr tablet was causing her many problems.    Please, call pt to discuss    [place]

## 2019-11-08 ENCOUNTER — VIRTUAL VISIT (OUTPATIENT)
Dept: FAMILY MEDICINE | Facility: CLINIC | Age: 44
End: 2019-11-08
Payer: COMMERCIAL

## 2019-11-08 DIAGNOSIS — F41.9 ANXIETY: ICD-10-CM

## 2019-11-08 DIAGNOSIS — R10.13 DYSPEPSIA: ICD-10-CM

## 2019-11-08 DIAGNOSIS — G47.00 INSOMNIA, UNSPECIFIED TYPE: Primary | ICD-10-CM

## 2019-11-08 PROCEDURE — 99441 ZZC PHYSICIAN TELEPHONE EVALUATION 5-10 MIN: CPT | Performed by: FAMILY MEDICINE

## 2019-11-08 RX ORDER — TRAZODONE HYDROCHLORIDE 50 MG/1
50 TABLET, FILM COATED ORAL AT BEDTIME
Qty: 30 TABLET | Refills: 1 | Status: SHIPPED | OUTPATIENT
Start: 2019-11-08 | End: 2019-11-08

## 2019-11-08 RX ORDER — TRAZODONE HYDROCHLORIDE 50 MG/1
50 TABLET, FILM COATED ORAL AT BEDTIME
Qty: 30 TABLET | Refills: 1 | Status: SHIPPED | OUTPATIENT
Start: 2019-11-08 | End: 2019-11-26

## 2019-11-08 RX ORDER — PANTOPRAZOLE SODIUM 40 MG/1
40 TABLET, DELAYED RELEASE ORAL DAILY PRN
Qty: 30 TABLET | Refills: 0 | Status: CANCELLED | OUTPATIENT
Start: 2019-11-08

## 2019-11-08 NOTE — PROGRESS NOTES
"Felicia Bermudez is a 44 year old female who is being evaluated via a billable telephone visit.      The patient has been notified of following:     \"This telephone visit will be conducted via a call between you and your physician/provider. We have found that certain health care needs can be provided without the need for a physical exam.  This service lets us provide the care you need with a short phone conversation.  If a prescription is necessary we can send it directly to your pharmacy.  If lab work is needed we can place an order for that and you can then stop by our lab to have the test done at a later time.    If during the course of the call the physician/provider feels a telephone visit is not appropriate, you will not be charged for this service.\"     Consent has been obtained for this service by 1 care team member: yes. See the scanned image in the medical record.    Felicia Bermudez complains of    Chief Complaint   Patient presents with     Telephone     Insomina      Buspar amitriptyline pristiq gabapentin hydroyzine previously tried. [employer] testing last Feb. Numerous meds with increased side effects  Initial complaints of Insomnia, difficulty concentrating fatigue  Initial Pristiq 25 \"unsettled\" insomnia, then Later \"brain fog gone\" palpitations \"acidic\" stomach  Increased to   Pristiq 50, c/o  HA fatigue trouble concentrating, palpitations, insisted on stopping No current meds        Anxiety  (primary encounter diagnosis) numerous med failures  Intractable chronic migraine without aura and without status migrainosus now absent  I have reviewed and updated the patient's Past Medical History, Social History, Family History and Medication List.    ALLERGIES  Bactrim [sulfamethoxazole w/trimethoprim]; Ambien; Amoxicillin; Augmentin; Avocado; Cucumber extract; Eszopiclone; Iodine; Latex; and Watermelon [citrullus vulgaris]    Kika Dickey RMA   (MA signature)    Additional provider notes: " Insomnia  Duration of complaint: 2-3 years     ROS insomnia, racing thoughts, disorganized thoughts in day    PHQ-9 SCORE 7/5/2017   PHQ-9 Total Score 17         Assessment/Plan:  ASSESSMENT / PLAN:  (G47.00) Insomnia, unspecified type  (primary encounter diagnosis)  Comment: majoor complaint  Plan: traZODone (DESYREL) 50 MG tablet        Higher circulating levels per [employer] testing: low dose    (R10.13) Dyspepsia  Comment absent. No longer on PPI  Plan:     (F41.9) Anxiety  Comment: No flowsheet data found.        Plan: monitor      Recheck phone visit 3-4 weeks    Braulio Raza MD        I have reviewed the note as documented above.  This accurately captures the substance of my conversation with the patient.      Total time of call between patient and provider was 6 minutes     Braulio Raza MD

## 2019-11-26 ENCOUNTER — VIRTUAL VISIT (OUTPATIENT)
Dept: FAMILY MEDICINE | Facility: CLINIC | Age: 44
End: 2019-11-26
Payer: COMMERCIAL

## 2019-11-26 DIAGNOSIS — G47.00 INSOMNIA, UNSPECIFIED TYPE: Primary | ICD-10-CM

## 2019-11-26 DIAGNOSIS — F41.9 ANXIETY: ICD-10-CM

## 2019-11-26 PROCEDURE — 99442 ZZC PHYSICIAN TELEPHONE EVALUATION 11-20 MIN: CPT | Performed by: FAMILY MEDICINE

## 2019-11-26 RX ORDER — QUETIAPINE FUMARATE 25 MG/1
25 TABLET, FILM COATED ORAL AT BEDTIME
Qty: 30 TABLET | Refills: 0 | Status: SHIPPED | OUTPATIENT
Start: 2019-11-26 | End: 2019-12-27

## 2019-11-26 ASSESSMENT — PATIENT HEALTH QUESTIONNAIRE - PHQ9
5. POOR APPETITE OR OVEREATING: NEARLY EVERY DAY
SUM OF ALL RESPONSES TO PHQ QUESTIONS 1-9: 14

## 2019-11-26 ASSESSMENT — ANXIETY QUESTIONNAIRES
1. FEELING NERVOUS, ANXIOUS, OR ON EDGE: NEARLY EVERY DAY
GAD7 TOTAL SCORE: 12
5. BEING SO RESTLESS THAT IT IS HARD TO SIT STILL: NOT AT ALL
2. NOT BEING ABLE TO STOP OR CONTROL WORRYING: NEARLY EVERY DAY
3. WORRYING TOO MUCH ABOUT DIFFERENT THINGS: NEARLY EVERY DAY
7. FEELING AFRAID AS IF SOMETHING AWFUL MIGHT HAPPEN: NOT AT ALL
IF YOU CHECKED OFF ANY PROBLEMS ON THIS QUESTIONNAIRE, HOW DIFFICULT HAVE THESE PROBLEMS MADE IT FOR YOU TO DO YOUR WORK, TAKE CARE OF THINGS AT HOME, OR GET ALONG WITH OTHER PEOPLE: NOT DIFFICULT AT ALL
6. BECOMING EASILY ANNOYED OR IRRITABLE: NOT AT ALL

## 2019-11-26 NOTE — PROGRESS NOTES
"Felicia Bermudez is a 44 year old female who is being evaluated via a billable telephone visit.      The patient has been notified of following:     \"This telephone visit will be conducted via a call between you and your physician/provider. We have found that certain health care needs can be provided without the need for a physical exam.  This service lets us provide the care you need with a short phone conversation.  If a prescription is necessary we can send it directly to your pharmacy.  If lab work is needed we can place an order for that and you can then stop by our lab to have the test done at a later time.    If during the course of the call the physician/provider feels a telephone visit is not appropriate, you will not be charged for this service.\"     Consent has been obtained for this service by 1 care team member: yes. See the scanned image in the medical record.    Felicia Bermudez complains of    Chief Complaint   Patient presents with     Telephone     Med check        I have reviewed and updated the patient's Past Medical History, Social History, Family History and Medication List.    ALLERGIES  Bactrim [sulfamethoxazole w/trimethoprim]; Ambien; Amoxicillin; Augmentin; Avocado; Cucumber extract; Eszopiclone; Iodine; Latex; and Watermelon [citrullus vulgaris]    Julianna Doll MA        Additional provider notes:   Anxiety Follow-Up    How are you doing with your anxiety since your last visit? Worsened Not being able to concentrate and feeling way more anxious      Are you having other symptoms that might be associated with anxiety? Yes:  Shortness breath, Tired all the time, and muscles are hurting because of being tense    Have you had a significant life event? No     Are you feeling depressed? Yes:  Since you stopped taking meds    Do you have any concerns with your use of alcohol or other drugs? No    Social History     Tobacco Use     Smoking status: Never Smoker     Smokeless tobacco: Never Used " "  Substance Use Topics     Alcohol use: No     Alcohol/week: 0.0 standard drinks     Drug use: No     No flowsheet data found.  PHQ 7/5/2017   PHQ-9 Total Score 17   Q9: Thoughts of better off dead/self-harm past 2 weeks Not at all     Last PHQ-9 11/26/2019   1.  Little interest or pleasure in doing things 3   2.  Feeling down, depressed, or hopeless 1   3.  Trouble falling or staying asleep, or sleeping too much 3   4.  Feeling tired or having little energy 3   5.  Poor appetite or overeating 0   6.  Feeling bad about yourself 0   7.  Trouble concentrating 3   8.  Moving slowly or restless 1   Q9: Thoughts of better off dead/self-harm past 2 weeks 0   PHQ-9 Total Score 14   Difficulty at work, home, or with people Somewhat difficult     MALLORIE-7  11/26/2019   1. Feeling nervous, anxious, or on edge 3   2. Not being able to stop or control worrying 3   3. Worrying too much about different things 3   4. Trouble relaxing 3   5. Being so restless that it is hard to sit still 0   6. Becoming easily annoyed or irritable 0   7. Feeling afraid, as if something awful might happen 0   MALLORIE-7 Total Score 12   If you checked any problems, how difficult have they made it for you to do your work, take care of things at home, or get along with other people? Not difficult at all     Buspar amitriptyline pristiq gabapentin hydroyzine Lunesta, amitriptyline, clonazepam. previously tried. Gene[employer] testing last Feb. Numerous meds with increased side effects. Recent intolerance of Pristiq, Trazodone. \"allergy\" to Zolpidem  ROS brain fog, anxiety insomnia    Assessment/Plan:  Insomnia, unspecified type  (primary encounter diagnosis) address with seroquel, low dose required per [employer]  Anxiety prominent, Symptoms may be related to lack of restorative sleep    May benefit from Sleep consultation(2016, CBT and psych referral suggested), Psych consultation (refused 2016)    I have reviewed the note as documented above.  This accurately " captures the substance of my conversation with the patient.  Braulio Raza MD      Total time of call between patient and provider was 12 minutes     Braulio Raza MD

## 2019-11-26 NOTE — PROGRESS NOTES
PA for Fetzima and/or Viibryd    Anxiety, insomnia    Buspar amitriptyline pristiq gabapentin hydroyzine Lunesta, amitriptyline, clonazepam, pristiq, trazodone failures  Braulio Raza MD

## 2019-11-27 ASSESSMENT — ANXIETY QUESTIONNAIRES: GAD7 TOTAL SCORE: 12

## 2019-12-26 ENCOUNTER — TELEPHONE (OUTPATIENT)
Dept: FAMILY MEDICINE | Facility: CLINIC | Age: 44
End: 2019-12-26

## 2019-12-26 DIAGNOSIS — G47.00 INSOMNIA, UNSPECIFIED TYPE: ICD-10-CM

## 2019-12-26 NOTE — TELEPHONE ENCOUNTER
12/26/2019    Patient stated she was told to call to update about how medication is going? Patient stated her medication for insomnia is helping but she believes she needs a higher dose. Please review and advise  Pt Ph:[phone]      Jessica Eddy -Patient Representative

## 2019-12-27 RX ORDER — QUETIAPINE FUMARATE 50 MG/1
50 TABLET, FILM COATED ORAL AT BEDTIME
Qty: 30 TABLET | Refills: 0 | Status: SHIPPED | OUTPATIENT
Start: 2019-12-27 | End: 2020-01-02

## 2019-12-27 ASSESSMENT — ANXIETY QUESTIONNAIRES
GAD7 TOTAL SCORE: 6
IF YOU CHECKED OFF ANY PROBLEMS ON THIS QUESTIONNAIRE, HOW DIFFICULT HAVE THESE PROBLEMS MADE IT FOR YOU TO DO YOUR WORK, TAKE CARE OF THINGS AT HOME, OR GET ALONG WITH OTHER PEOPLE: SOMEWHAT DIFFICULT
6. BECOMING EASILY ANNOYED OR IRRITABLE: NOT AT ALL
1. FEELING NERVOUS, ANXIOUS, OR ON EDGE: SEVERAL DAYS
3. WORRYING TOO MUCH ABOUT DIFFERENT THINGS: SEVERAL DAYS
7. FEELING AFRAID AS IF SOMETHING AWFUL MIGHT HAPPEN: NOT AT ALL
5. BEING SO RESTLESS THAT IT IS HARD TO SIT STILL: NOT AT ALL
2. NOT BEING ABLE TO STOP OR CONTROL WORRYING: SEVERAL DAYS

## 2019-12-27 ASSESSMENT — PATIENT HEALTH QUESTIONNAIRE - PHQ9
SUM OF ALL RESPONSES TO PHQ QUESTIONS 1-9: 8
5. POOR APPETITE OR OVEREATING: NEARLY EVERY DAY

## 2019-12-27 NOTE — TELEPHONE ENCOUNTER
Spoke to the Pt, she is agreeable with plan to do 50 mg. She needs a new script, Will send in 30 day supply of the 50 mg tabs.   She will give us a call on Monday if the 50 mg is not helpful.     Tracy Parsons, RN -- [facility]

## 2019-12-27 NOTE — TELEPHONE ENCOUNTER
Recent telephone visit for insomnia    QUEtiapine (SEROQUEL) 25 MG tablet 30 tablet 0 11/26/2019  --   Sig - Route: Take 1 tablet (25 mg) by mouth At Bedtime - Oral       C/o body aches, using Tylenol/Advil 3x/d with minimal relief, body feels tense, requesting muscle relaxant  Watches TV about an hour before bed, goes to bed at 11 pm, falls asleep at 12 am, plans to sleep until 6 am, but usually wakes at 3-4 am  Pt c/o daytime drowsiness  Pt wondering if she should increase dose  PHQ-9 SCORE 7/5/2017 11/26/2019 12/27/2019   PHQ-9 Total Score 17 14 8     MALLORIE-7 SCORE 11/26/2019 12/27/2019   Total Score 12 6           Route to provider to review and advise    Livia Alicia RN Nurse Triage

## 2019-12-28 ASSESSMENT — ANXIETY QUESTIONNAIRES: GAD7 TOTAL SCORE: 6

## 2019-12-31 RX ORDER — QUETIAPINE FUMARATE 25 MG/1
25 TABLET, FILM COATED ORAL
Qty: 30 TABLET | Refills: 0 | Status: SHIPPED | OUTPATIENT
Start: 2019-12-31 | End: 2020-01-02 | Stop reason: DRUGHIGH

## 2019-12-31 NOTE — TELEPHONE ENCOUNTER
Dr. Raza:    The Pt has already been on Seroquel, see below. She was up to 50 mg at HS. Not effective for her.   At this point should we just advise CBT? Would a referral need to be placed for this by you?    Tracy Parsons RN -- [facility]

## 2019-12-31 NOTE — TELEPHONE ENCOUNTER
I am trying a lower dose, as [employer] testing suggests she will have more side effects with almost every medicine.. A lower dose may reduce side effects, and get her to sleep.   However, as she has failed a multitude of meds, perhaps because of the above, CBT is recommended. She has declined for now 4  Years.  I don't think there is a magic medicine  Braulio Raza MD

## 2019-12-31 NOTE — TELEPHONE ENCOUNTER
[employer] testing suggests low dose seroquel  Trial lower dose seroquel (sent)  If ineffective, recommend Cognitive Behavioral Therapy (pt previously declined)  Braulio Raza MD

## 2019-12-31 NOTE — TELEPHONE ENCOUNTER
Pt calls, informs took quetiapine 50 mg x 4 nights with no success, feels exhausted during the day, ongoing issue, informs Dr Arredondo is PCP but wants BW to handle this issue, still not sleeping, told to call with update, wants  to advise, routed, INFORM pt when final    Last office visit: 11/26/2019 with prescribing provider:  Virtual visit BW   Future Office Visit:    Cady Pelayo RN, BSN  Message handled by Nurse Triage.

## 2020-01-02 RX ORDER — QUETIAPINE FUMARATE 50 MG/1
50 TABLET, FILM COATED ORAL AT BEDTIME
Qty: 90 TABLET | Refills: 1 | Status: SHIPPED | OUTPATIENT
Start: 2020-01-02 | End: 2020-01-23

## 2020-01-02 NOTE — TELEPHONE ENCOUNTER
PCP:    I spoke with the Pt regarding below. She did report she tried taking her medication at 8:00pm (earlier than she was taking it before).  She did say this worked much better, she was able to sleep and did not feel groggy in the morning.     She would like to continue taking the 50 mg dose at HS if Dr. Raza agrees.     Tracy Parsons RN -- [facility]

## 2020-01-23 ENCOUNTER — TELEPHONE (OUTPATIENT)
Dept: FAMILY MEDICINE | Facility: CLINIC | Age: 45
End: 2020-01-23

## 2020-01-23 DIAGNOSIS — G47.00 INSOMNIA, UNSPECIFIED TYPE: ICD-10-CM

## 2020-01-23 RX ORDER — QUETIAPINE FUMARATE 25 MG/1
TABLET, FILM COATED ORAL
Qty: 30 TABLET | Refills: 3 | Status: SHIPPED | OUTPATIENT
Start: 2020-01-23 | End: 2020-05-04

## 2020-01-23 NOTE — TELEPHONE ENCOUNTER
01/23/2020    Pt wants Dr Raza to possibly prescribe a different med because QUEtiapine (SEROQUEL) 50 MG tablet is helping however it makes the pt extremely tired. Please call her back @ [phone]

## 2020-01-23 NOTE — TELEPHONE ENCOUNTER
12/26 I spoke with the Pt regarding below. She did report she tried taking her medication at 8:00pm (earlier than she was taking it before).  She did say this worked much better, she was able to sleep and did not feel groggy in the morning.      She would like to continue taking the 50 mg dose at HS if Dr. Raza agrees.     Let's reduce the dose to 25 again, at 8 PM  Change Options are limited     Sent  Braulio Raza MD
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
## 2020-02-03 ENCOUNTER — OFFICE VISIT (OUTPATIENT)
Dept: FAMILY MEDICINE | Facility: CLINIC | Age: 45
End: 2020-02-03
Payer: COMMERCIAL

## 2020-02-03 VITALS
HEART RATE: 61 BPM | SYSTOLIC BLOOD PRESSURE: 124 MMHG | HEIGHT: 63 IN | DIASTOLIC BLOOD PRESSURE: 82 MMHG | WEIGHT: 166 LBS | BODY MASS INDEX: 29.41 KG/M2 | TEMPERATURE: 97.7 F | OXYGEN SATURATION: 100 %

## 2020-02-03 DIAGNOSIS — G47.00 INSOMNIA, UNSPECIFIED TYPE: ICD-10-CM

## 2020-02-03 DIAGNOSIS — N92.0 MENORRHAGIA WITH REGULAR CYCLE: Primary | ICD-10-CM

## 2020-02-03 DIAGNOSIS — R35.0 URINARY FREQUENCY: ICD-10-CM

## 2020-02-03 LAB
ALBUMIN UR-MCNC: NEGATIVE MG/DL
APPEARANCE UR: CLEAR
BACTERIA #/AREA URNS HPF: ABNORMAL /HPF
BILIRUB UR QL STRIP: NEGATIVE
COLOR UR AUTO: YELLOW
ERYTHROCYTE [DISTWIDTH] IN BLOOD BY AUTOMATED COUNT: 13.6 % (ref 10–15)
GLUCOSE UR STRIP-MCNC: NEGATIVE MG/DL
HCT VFR BLD AUTO: 40.1 % (ref 35–47)
HGB BLD-MCNC: 13 G/DL (ref 11.7–15.7)
HGB UR QL STRIP: ABNORMAL
KETONES UR STRIP-MCNC: NEGATIVE MG/DL
LEUKOCYTE ESTERASE UR QL STRIP: NEGATIVE
MCH RBC QN AUTO: 27.6 PG (ref 26.5–33)
MCHC RBC AUTO-ENTMCNC: 32.4 G/DL (ref 31.5–36.5)
MCV RBC AUTO: 85 FL (ref 78–100)
MUCOUS THREADS #/AREA URNS LPF: PRESENT /LPF
NITRATE UR QL: NEGATIVE
NON-SQ EPI CELLS #/AREA URNS LPF: ABNORMAL /LPF
PH UR STRIP: 6 PH (ref 5–7)
PLATELET # BLD AUTO: 205 10E9/L (ref 150–450)
RBC # BLD AUTO: 4.71 10E12/L (ref 3.8–5.2)
RBC #/AREA URNS AUTO: ABNORMAL /HPF
SOURCE: ABNORMAL
SP GR UR STRIP: 1.01 (ref 1–1.03)
UROBILINOGEN UR STRIP-ACNC: 0.2 EU/DL (ref 0.2–1)
WBC # BLD AUTO: 7.6 10E9/L (ref 4–11)
WBC #/AREA URNS AUTO: ABNORMAL /HPF

## 2020-02-03 PROCEDURE — 82728 ASSAY OF FERRITIN: CPT | Performed by: FAMILY MEDICINE

## 2020-02-03 PROCEDURE — 81001 URINALYSIS AUTO W/SCOPE: CPT | Performed by: FAMILY MEDICINE

## 2020-02-03 PROCEDURE — 36415 COLL VENOUS BLD VENIPUNCTURE: CPT | Performed by: FAMILY MEDICINE

## 2020-02-03 PROCEDURE — 85027 COMPLETE CBC AUTOMATED: CPT | Performed by: FAMILY MEDICINE

## 2020-02-03 PROCEDURE — 99214 OFFICE O/P EST MOD 30 MIN: CPT | Performed by: FAMILY MEDICINE

## 2020-02-03 RX ORDER — ESZOPICLONE 1 MG/1
1 TABLET, FILM COATED ORAL
Qty: 30 TABLET | Refills: 0 | Status: SHIPPED | OUTPATIENT
Start: 2020-02-03 | End: 2020-05-04

## 2020-02-03 ASSESSMENT — MIFFLIN-ST. JEOR: SCORE: 1364.16

## 2020-02-04 LAB — FERRITIN SERPL-MCNC: 12 NG/ML (ref 12–150)

## 2020-02-07 ENCOUNTER — TELEPHONE (OUTPATIENT)
Dept: SLEEP MEDICINE | Facility: CLINIC | Age: 45
End: 2020-02-07

## 2020-02-07 NOTE — TELEPHONE ENCOUNTER
A request to schedule an appointment has been received for Dr. Mateus Meyers.   The patient has been notified that a member of the [facility] will contact the patient to schedule the appointment.

## 2020-02-18 ENCOUNTER — DOCUMENTATION ONLY (OUTPATIENT)
Dept: SLEEP MEDICINE | Facility: CLINIC | Age: 45
End: 2020-02-18
Payer: COMMERCIAL

## 2020-02-18 NOTE — PROGRESS NOTES
Pt contacted for [facility] pre-clinical visit. Pt was screened for ANGELA and RLS. Both are negative. Pt states that she struggles with both falling and staying asleep. Pt states that her biggest issues is getting her mind to shut down at night.  Overview of CBT-I was given and options explained.  Pt wants to check with her insurance about her coverage before she decides which option to do. She will contact up when she decides if she wants to do in person or online CBT-I.

## 2020-05-04 VITALS — BODY MASS INDEX: 27.31 KG/M2 | HEIGHT: 64 IN | WEIGHT: 160 LBS

## 2020-05-04 ASSESSMENT — MIFFLIN-ST. JEOR: SCORE: 1352.82
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
## 2020-05-04 NOTE — PATIENT INSTRUCTIONS
Great meeting with you over the phone today to assess your insomnia.  Good news is that as previous evaluations there is no indication of risk for sleep apnea or signs of restless legs. Your insomnia is a chronic, primary insomnia that often benefits from behavioral therapy. Overall your sleep hygiene is fairly good.  I recommend you maintain a consistent sleep schedule between 1130-6:45 AM, even on weekends and continue to avoid napping.  Consider getting television out of the bedroom and instead using a phone fox with relaxing/comforting/distracting stories.  If the relaxation or meditation tapes cause irritation, recommend he stop using these.  See below for specific instructions    Follow-up appointment scheduled for May 19, 930, telephone visit.    Record your sleep estimates in the CBTI  Application where you will also find more information about insomnia.  The fox was designed in particular for individuals with insomnia and history of PTSD.        Cognitive Behavioral Therapy for Insomnia (CBT-I)    Sleep Strengthening    Now that you understand a bit more about how sleep works and what causes insomnia, you are ready to move on to the core of CBT-I:  Sleep Strengthening.  This process involves five key strategies that will:    ? Strengthen your sleep drive and biological sleep clock  ? Strengthen the link between your bed and sleep    Core Sleep Strengthening Strategies     You are now ready to start the process of strengthening your sleep. Much like physical therapy strengthens muscles, studies show these five sleep strategies are the key to achieving stronger, healthier sleep.     1. Reduce your  Time In Bed    Spending extra time in bed trying to get more sleep actually can cause more sleep disruption and weaken sleep efficiency. Sleep efficiency is simply the percentage of time a person spends asleep while in bed. Normal sleep efficiency is thought to be 85% or greater.  By reducing your time in bed,  you will be awake longer leading to quicker and deeper sleep. This strategy does not reduce the amount of sleep you get, just the time you are awake in bed:                                       Your Sleep Schedule Prescription    During the first step of sleep strengthening, you will reduce your time in bed following a Sleep Schedule Prescription. Your prescription is determined by the average nightly amount of sleep you got over the past two weeks plus 30 minutes. It also takes into account the best time for you to sleep. The least amount of time prescribed is 6 hours in bed unless a sleep specialist recommends otherwise.     Total Time in Bed:  7  hours 15 minBedtime:  No earlier than  11:30 PM  Wake-up Time:  Out of bed every day by  6:45 AM     2. Don't go to bed until you feel sleepy (not tired or fatigued)     This helps increase your sleep drive by keeping you awake longer.  If you go to bed when you're not sleepy, it gives your brain the wrong message and can lead to frustration.     If you feel sleepy before your prescribed bedtime, find activities that can help you stay awake until it is time for you to go to bed. Even brief naps in the evening can be very disruptive of sleep at night.     3. Don't stay in bed unless you are sleeping      If you are unable to fall asleep or return to sleep after about 30 minutes, get out of bed. This helps train your brain that the bed is for sleep. It is harmful to your sleep when you are worried or frustrated in bed. Do not read, eat, worry, think about sleep, use mobile phones or tablets, or watch TV in bed. Do not watch the clock during the night.     Go to another room and do something relaxing. Plan things you can do when you get out of bed. Avoid use of mobile devices or computers when out of bed.    Return to bed only when you feel sleepy again.  Repeat as often as needed throughout the night.     4. Get out of bed at the same time every day    Getting up at the  same time helps set your biological clock. It is important to stick to your wake time no matter how much sleep you got the night before or how you feel in the morning.    Varying your wake can have the same effect as jet lag.  It disrupts your biological clock and makes you feel more tired and exhausted.  Trying to  catch up  on sleep on the weekends only makes things worse and sets you up for a cycle of poor sleep the following weekdays.      Make sure you set an alarm and keep it away from the bed to prevent looking at the clock during the night.      5. Avoid daytime napping    Avoid daytime napping if possible. Napping partially fulfills your need for sleep and weakens your sleep drive at night.    However, if you find yourself sleepy (not just tired) you can take a brief 15-30 minute nap during the day if needed.  Naps within 7-8 hours of your prescribed wake time are less likely to affect your sleep the coming night.  Sleepy people make more mistakes and may hurt themselves or others. Therefore, safety trumps all other sleep prescriptions.  Never drive or operate equipment if drowsy or sleepy.     Changing Your Sleep Schedule Prescription    After one week, you can adjust your sleep schedule prescription based on how well you are sleeping. Use the following guidelines to change your prescribed time in bed based on information from your Sleep Diary, Mobile Sleep Diary Dawit or Wearable Device:          Increase Time in Bed by 15 Minutes IF:      You are falling asleep in less than 30 minutes AND awake less than 30 minutes during the night.       OR your CBT-I  pp, Mobile Dawit or Wearable Device indicates your sleep efficiency has been greater than 90%.              Decrease Time in Bed by 15 Minutes IF:      It is taking longer than 30 minutes to fall asleep OR you are awake more than 30 minutes during the night.        OR your CBT-I  Dawit, Mobile Dawit or Wearable Device indicates your sleep efficiency has  been less than 80%.      DO NOT decrease your sleep schedule below 6 hours unless instructed by your provider.    Change is Challenging     Research shows that making significant changes in sleep habits improves sleep quality and how you feel. Improvement takes time and is not always steady. Change is challenging and can be stressful.  This is especially true if old habits - like spending more time in bed -- were a way to avoid worry about getting enough sleep.            Cognitive Behavioral Therapy for Insomnia (CBT-I)    Relaxation and Sleep    Winding Down Time    A wind-down time before you go to bed can help you relax your mind and body. Ways to help transition from a busy day to bedtime include things like:    ? Listening to calm music  ? Reading  ? Watching a pleasant or relaxing TV show  ? Taking a bath    We recommend you set a reminder to begin your wind down time one hour before bedtime.  Many mobile sleep apps such as CBT-I  have wind-down time reminders.    Relaxation and Sleep    Research shows relaxation training can reduce the time it takes to fall asleep and improve sleep quality.  Relaxation training works by reducing physical, emotional and mental arousal that can interfere with your sleep.     Relaxation skills are easy to learn on your own and are widely available free through mobile apps or online.  We recommend doing some form of relaxation exercise daily for at least 10 minutes.  This can include short breathing exercises used throughout the day to help manage stress.    If you are using the CBT-I  mobile fox you will find the following relaxation exercises in the Tools section including Breathing Tools, Progressive Muscle Relaxation and Mindfulness Exercise.    Insight Timer and Calm are two examples of many well-reviewed mobile apps that offer free and for-purchase guided relaxation exercises and meditations.     You Tube is a great resource learning a range of techniques from  progressive muscle relaxation to guided imagery.  One example is Five of the Best Sleep Guided Meditations, a set of guided sleep meditations at https://www.youtube.com/watch?v=wsB_TnQfCxA    Do not use guided relaxation tapes while driving or operating equipment.      Managing Worry before Bed    All human beings worry.  Uncontrolled worry can affect your sleep and health by activating your arousal system.  Worry makes your brain, body and heart behave like there is a danger or threat.  This stress response can make it more difficult to fall asleep and stay asleep. The most common types of worry include:    ? Concerned about unfinished tasks  ? Concern over family, finances or work  ? Worry about things beyond your control.     Scheduling Constructive Worry Time    The part of our brain that plans, sorts, and helps manage our emotions is less effective in doing its job as nighttime comes.  Without the usual distractions of the day, we tend to worry more and have trouble putting aside our worries.    A simple technique called Constructive Worry Time can help to reduce and manage worry as you approach bedtime or in the middle of the night. It is most effective when practiced daily.

## 2020-05-05 ENCOUNTER — VIRTUAL VISIT (OUTPATIENT)
Dept: SLEEP MEDICINE | Facility: CLINIC | Age: 45
End: 2020-05-05
Attending: FAMILY MEDICINE
Payer: COMMERCIAL

## 2020-05-05 DIAGNOSIS — F51.04 CHRONIC INSOMNIA: Primary | ICD-10-CM

## 2020-05-05 PROCEDURE — 98968 PH1 ASSMT&MGMT NQHP 21-30: CPT | Performed by: PSYCHOLOGIST

## 2020-05-18 VITALS — WEIGHT: 158 LBS | HEIGHT: 64 IN | BODY MASS INDEX: 26.98 KG/M2

## 2020-05-18 ASSESSMENT — MIFFLIN-ST. JEOR: SCORE: 1343.74

## 2020-05-18 NOTE — PATIENT INSTRUCTIONS
Your BMI is Body mass index is 27.55 kg/m .  Weight management is a personal decision.  If you are interested in exploring weight loss strategies, the following discussion covers the approaches that may be successful. Body mass index (BMI) is one way to tell whether you are at a healthy weight, overweight, or obese. It measures your weight in relation to your height.  A BMI of 18.5 to 24.9 is in the healthy range. A person with a BMI of 25 to 29.9 is considered overweight, and someone with a BMI of 30 or greater is considered obese. More than two-thirds of American adults are considered overweight or obese.  Being overweight or obese increases the risk for further weight gain. Excess weight may lead to heart disease and diabetes.  Creating and following plans for healthy eating and physical activity may help you improve your health.  Weight control is part of healthy lifestyle and includes exercise, emotional health, and healthy eating habits. Careful eating habits lifelong are the mainstay of weight control. Though there are significant health benefits from weight loss, long-term weight loss with diet alone may be very difficult to achieve- studies show long-term success with dietary management in less than 10% of people. Attaining a healthy weight may be especially difficult to achieve in those with severe obesity. In some cases, medications, devices and surgical management might be considered.  What can you do?  If you are overweight or obese and are interested in methods for weight loss, you should discuss this with your provider.     Consider reducing daily calorie intake by 500 calories.     Keep a food journal.     Avoiding skipping meals, consider cutting portions instead.    Diet combined with exercise helps maintain muscle while optimizing fat loss. Strength training is particularly important for building and maintaining muscle mass. Exercise helps reduce stress, increase energy, and improves fitness.  Increasing exercise without diet control, however, may not burn enough calories to loose weight.       Start walking three days a week 10-20 minutes at a time    Work towards walking thirty minutes five days a week     Eventually, increase the speed of your walking for 1-2 minutes at time    In addition, we recommend that you review healthy lifestyles and methods for weight loss available through the National Institutes of Health patient information sites:  http://win.niddk.nih.gov/publications/index.htm    And look into health and wellness programs that may be available through your health insurance provider, employer, local community center, or gamal club.    Weight management plan: Patient was referred to their PCP to discuss a diet and exercise plan.

## 2020-05-18 NOTE — PROGRESS NOTES
"Felicia Bermudez is a 44 year old female who is being evaluated via a billable telephone visit.      The patient has been notified of following:     \"This telephone visit will be conducted via a call between you and your physician/provider. We have found that certain health care needs can be provided without the need for a physical exam.  This service lets us provide the care you need with a short phone conversation.  If a prescription is necessary we can send it directly to your pharmacy.  If lab work is needed we can place an order for that and you can then stop by our lab to have the test done at a later time.    Telephone visits are billed at different rates depending on your insurance coverage. During this emergency period, for some insurers they may be billed the same as an in-person visit.  Please reach out to your insurance provider with any questions.    If during the course of the call the physician/provider feels a telephone visit is not appropriate, you will not be charged for this service.\"    Patient has given verbal consent for Telephone visit?  Yes    What phone number would you like to be contacted at? [phone]    How would you like to obtain your AVS? Mail a copy    Phone call duration: 24 minutes    SLEEP MEDICINE TELEPHONE VISIT  Sleep Psychology    Patient Name: Felicia Bermudez  MRN:  [id number]  Date of Service: May 19, 2020       Subjective Report     Felicia Bermudez  returns for a telephone visit to discuss progress in implementing behavioral strategies for the management of chronic insomnia.  Consent for visit was obtained and documented. Felicia reports she is waking frequently due to feeling \"very hot.\"  Keep sleep schedule at 11:30 AM.   States it is taking her about 90 minutes to fall asleep.    Feels she is getting about 4-5 hours.  More nights where she is getting up to 7-8 hours of sleep.  Patient is concerned that she may be entering perimenopause.  She states that avoiding clock " "watching has been very helpful in reducing overall anxiety and worry about sleep.  We reduced cognitive restructuring activities to help reduce subjective worry and rumination about insomnia.  Patient feels hopeful about her improvement.     .     Sleep Data:     Source of Data: Written sleep diaries    Sleep Latency: 60 min.  Times Awakened: 2  Wake Time After Sleep Onset: 60 min.  Total Sleep Time: 6 hours 0 min.  Sleep Efficiency: Approximately 75 %    Total score - Quinton: 0 .    XIMENA Total Score: 17       Interventions     Strategies and recommendations including maintenance of stimulus control, cognitive restructuring were discussed today.       Vital Signs     Ht 1.613 m (5' 3.5\")   Wt 71.7 kg (158 lb)   BMI 27.55 kg/m       Mental Status     Speech/Language:  Normal  Thought Process: Intact  Associations:  Normal  Thought Content: Normal    Diagnostic Impressions and Plan       1. Chronic insomnia  Patient is reporting improved quality sleep, some reduction in sleep latency though it continues to be extended.  It is possible that patient is experiencing perimenopausal symptoms resulting in increased feeling of warmth and even hot flashes in the middle the night.  We discussed strategies to help maintain cool and comfortable bedroom temperature.  She will continue with her prescribed sleep schedule of 11:30 PM-6:45 AM with stimulus control.        Follow-up: 2 weeks      Mateus Meyers PsyD, JONATHAN, DBSM  Diplomate, Behavioral Sleep Medicine  [facility]                        "

## 2020-05-19 ENCOUNTER — VIRTUAL VISIT (OUTPATIENT)
Dept: SLEEP MEDICINE | Facility: CLINIC | Age: 45
End: 2020-05-19
Payer: COMMERCIAL

## 2020-05-19 DIAGNOSIS — F51.04 CHRONIC INSOMNIA: Primary | ICD-10-CM

## 2020-05-19 PROCEDURE — 98968 PH1 ASSMT&MGMT NQHP 21-30: CPT | Performed by: PSYCHOLOGIST

## 2020-05-26 ENCOUNTER — VIRTUAL VISIT (OUTPATIENT)
Dept: PEDIATRICS | Facility: CLINIC | Age: 45
End: 2020-05-26
Payer: COMMERCIAL

## 2020-05-26 DIAGNOSIS — E55.9 VITAMIN D DEFICIENCY: ICD-10-CM

## 2020-05-26 DIAGNOSIS — N92.0 MENORRHAGIA WITH REGULAR CYCLE: ICD-10-CM

## 2020-05-26 DIAGNOSIS — N92.6 ABNORMAL MENSES: Primary | ICD-10-CM

## 2020-05-26 PROCEDURE — 99214 OFFICE O/P EST MOD 30 MIN: CPT | Mod: TEL | Performed by: INTERNAL MEDICINE

## 2020-05-26 ASSESSMENT — ANXIETY QUESTIONNAIRES
2. NOT BEING ABLE TO STOP OR CONTROL WORRYING: NEARLY EVERY DAY
GAD7 TOTAL SCORE: 15
3. WORRYING TOO MUCH ABOUT DIFFERENT THINGS: NEARLY EVERY DAY
IF YOU CHECKED OFF ANY PROBLEMS ON THIS QUESTIONNAIRE, HOW DIFFICULT HAVE THESE PROBLEMS MADE IT FOR YOU TO DO YOUR WORK, TAKE CARE OF THINGS AT HOME, OR GET ALONG WITH OTHER PEOPLE: SOMEWHAT DIFFICULT
1. FEELING NERVOUS, ANXIOUS, OR ON EDGE: NEARLY EVERY DAY
5. BEING SO RESTLESS THAT IT IS HARD TO SIT STILL: NOT AT ALL
7. FEELING AFRAID AS IF SOMETHING AWFUL MIGHT HAPPEN: SEVERAL DAYS
6. BECOMING EASILY ANNOYED OR IRRITABLE: MORE THAN HALF THE DAYS

## 2020-05-26 ASSESSMENT — PATIENT HEALTH QUESTIONNAIRE - PHQ9
SUM OF ALL RESPONSES TO PHQ QUESTIONS 1-9: 10
5. POOR APPETITE OR OVEREATING: NEARLY EVERY DAY

## 2020-05-26 NOTE — PROGRESS NOTES
"Felicia Bermudez is a 44 year old female who is being evaluated via a billable telephone visit.      The patient has been notified of following:     \"This telephone visit will be conducted via a call between you and your physician/provider. We have found that certain health care needs can be provided without the need for a physical exam.  This service lets us provide the care you need with a short phone conversation.  If a prescription is necessary we can send it directly to your pharmacy.  If lab work is needed we can place an order for that and you can then stop by our lab to have the test done at a later time.    Telephone visits are billed at different rates depending on your insurance coverage. During this emergency period, for some insurers they may be billed the same as an in-person visit.  Please reach out to your insurance provider with any questions.    If during the course of the call the physician/provider feels a telephone visit is not appropriate, you will not be charged for this service.\"    Patient has given verbal consent for Telephone visit?  Yes    What phone number would you like to be contacted at? [phone]    How would you like to obtain your AVS? Does not want after visit summary     Subjective     Felicia Bermudez is a 44 year old female who presents via phone visit today for the following health issues:    HPI     Menopausal Sx       Duration: 1.5 month     Description (location/character/radiation): feeling hot and cold at night, trouble concentrating    Intensity:  mild    Accompanying signs and symptoms: mood changes, body aches, blurry vision      History (similar episodes/previous evaluation): None    Precipitating or alleviating factors: Heavy periods for about 3 days, LMP: 05/16    Therapies tried and outcome: None     Believes that she may be going through menopause.     Feels like cannot concentrate; gets hot and cold day and night.      Struggling with weight, despite exercising 5 " times per week.  Has trouble concentrating and retaining information.      Has been going on for about 1 year.      Has been still having menses :  3 heavy days, and then 4 lighter days, about once monthly, but sometimes twice.      Has been taking iron supplements, as needed atarax, and vitamin D.     Had endometrial ablation, but menses never normalized.      Has been getting 5-6 hours per sleep; doing cognitive behavioral therapy at sleep center.  Headaches are improving.          Patient Active Problem List   Diagnosis     Urticaria     Multiple food allergies     PUD (peptic ulcer disease)     Abnormal Pap smear of cervix     High risk HPV infection     Abnormal weight gain     Insomnia, unspecified type     Menorrhagia with regular cycle     Anxiety     Vitamin D deficiency     Dyspepsia     Migraine without status migrainosus, not intractable     Adenitis     Past Surgical History:   Procedure Laterality Date     DILATION AND CURETTAGE, HYSTEROSCOPY, ABLATE ENDOMETRIUM NOVASURE, COMBINED N/A 3/16/2018    Procedure: COMBINED DILATION AND CURETTAGE, HYSTEROSCOPY, ABLATE ENDOMETRIUM NOVASURE;  Hysteroscopy, Dilation and Curettage, unable to perform Endometrial Ablation with Novasure secondary to uterine perforation, diagnostic laparoscopy, Bilateral Laparoscopic Tubal Ligation ;  Surgeon: Danilo White MD;  Location: [facility] OR     EYE SURGERY      REmoval of mass left eye     LAPAROSCOPIC TUBAL LIGATION Bilateral 3/16/2018    Procedure: LAPAROSCOPIC TUBAL LIGATION;;  Surgeon: Danilo White MD;  Location: [facility] OR     LAPAROSCOPY DIAGNOSTIC (GYN)       SURGICAL HISTORY OF -       2010 turbinate surgery       Social History     Tobacco Use     Smoking status: Never Smoker     Smokeless tobacco: Never Used   Substance Use Topics     Alcohol use: No     Alcohol/week: 0.0 standard drinks     Family History   Problem Relation Age of Onset     Neurologic Disorder Mother 57        brain anuerysm     Hypertension  Father      Other Cancer Father 70        Testicle Cancer     Other - See Comments Daughter 21        rare blood disorders     Polycythemia Daughter          Current Outpatient Medications   Medication Sig Dispense Refill     hydrOXYzine (ATARAX) 25 MG tablet Take 1 tablet (25 mg) by mouth every 4 hours as needed For hives 30 tablet 11     IBUPROFEN PO Take 400 mg by mouth every 6 hours as needed        Multiple Vitamins-Minerals (MULTIVITAMIN OR)        Allergies   Allergen Reactions     Bactrim [Sulfamethoxazole W/Trimethoprim]      Tongue swelling     Ambien      Tongue swelling     Amoxicillin      Tongue swelling     Augmentin      Tongue swelling     Avocado Other (See Comments) and Hives     Selling of mouth.     Cucumber Extract Other (See Comments)     Tongue swelling     Iodine Hives     Latex Hives     Lip swelling     Watermelon [Citrullus Vulgaris] Hives     itching     BP Readings from Last 3 Encounters:   02/03/20 124/82   04/29/19 127/77   04/25/19 108/64    Wt Readings from Last 3 Encounters:   05/18/20 71.7 kg (158 lb)   05/04/20 72.6 kg (160 lb)   02/03/20 75.3 kg (166 lb)                    Reviewed and updated as needed this visit by Provider         Review of Systems   CONSTITUTIONAL: NEGATIVE for fever, chills, change in weight  INTEGUMENTARY/SKIN: NEGATIVE for worrisome rashes, moles or lesions  EYES: NEGATIVE for vision changes or irritation  ENT/MOUTH: NEGATIVE for ear, mouth and throat problems  RESP: NEGATIVE for significant cough or SOB  BREAST: NEGATIVE for masses, tenderness or discharge  CV: NEGATIVE for chest pain, palpitations or peripheral edema  GI: NEGATIVE for nausea, abdominal pain, heartburn, or change in bowel habits  : NEGATIVE for frequency, dysuria, or hematuria  MUSCULOSKELETAL: NEGATIVE for significant arthralgias or myalgia  NEURO: NEGATIVE for weakness, dizziness or paresthesias  ENDOCRINE: NEGATIVE for temperature intolerance, skin/hair changes  HEME: NEGATIVE for  bleeding problems  PSYCHIATRIC: NEGATIVE for changes in mood or affect       Objective   Reported vitals:  There were no vitals taken for this visit.   healthy, alert and no distress  PSYCH: Alert and oriented times 3; coherent speech, normal   rate and volume, able to articulate logical thoughts, able   to abstract reason, no tangential thoughts, no hallucinations   or delusions  Her affect is normal  RESP: No cough, no audible wheezing, able to talk in full sentences  Remainder of exam unable to be completed due to telephone visits    Diagnostic Test Results:  Labs reviewed in Epic        Assessment/Plan:  1. Abnormal menses  The patient has concerns about early menopause contributing to her multitude of symptoms which include insomnia, lack of focus, heat intolerance, and her abnormal menstrual cycle.  However she does have significant bleeding that has never gotten better even despite her endometrial ablation back in 2018, and her last labs from January and February do show significant anemia.  She has been on iron for the last several months, but I believe that a lot of her symptoms are most likely due to a combination of insomnia, and anemia.  Most likely her anemia is due to her significant hyper menorrhagia.  Nevertheless I will add an estradiol and an LH and FSH to determine if she is in menopause.  - CBC with platelets and differential; Future  - TSH with free T4 reflex; Future  - **Iron and iron binding capacity FUTURE 2mo; Future  - Ferritin; Future  - Comprehensive metabolic panel (BMP + Alb, Alk Phos, ALT, AST, Total. Bili, TP); Future  - Estradiol; Future  - Lutropin; Future  - Follicle stimulating hormone; Future    2. Menorrhagia with regular cycle  If she does indeed have no evidence of menopause, and if indeed she is anemic despite being on iron tablets and despite her history of endometrial ablation, I will recommend that she see OB/GYN for a consideration of a hysterectomy.    3. Vitamin D  deficiency  We will check her vitamin D levels today to see if she has any vitamin D deficiency contributing to her fatigue.  Of note, her headaches are better, and her insomnia is better.  She has been following with the sleep specialists.  - Vitamin D Deficiency; Future    No follow-ups on file.      Phone call duration:  20 minutes    Jose Daniel Arredondo MD

## 2020-05-27 ASSESSMENT — ANXIETY QUESTIONNAIRES: GAD7 TOTAL SCORE: 15

## 2020-05-28 DIAGNOSIS — E55.9 VITAMIN D DEFICIENCY: ICD-10-CM

## 2020-05-28 DIAGNOSIS — N92.6 ABNORMAL MENSES: ICD-10-CM

## 2020-05-28 LAB
BASOPHILS # BLD AUTO: 0 10E9/L (ref 0–0.2)
BASOPHILS NFR BLD AUTO: 0.3 %
DIFFERENTIAL METHOD BLD: NORMAL
EOSINOPHIL # BLD AUTO: 0.2 10E9/L (ref 0–0.7)
EOSINOPHIL NFR BLD AUTO: 2.7 %
ERYTHROCYTE [DISTWIDTH] IN BLOOD BY AUTOMATED COUNT: 13.9 % (ref 10–15)
ESTRADIOL SERPL-MCNC: 101 PG/ML
FSH SERPL-ACNC: 6.5 IU/L
HCT VFR BLD AUTO: 38.3 % (ref 35–47)
HGB BLD-MCNC: 12.4 G/DL (ref 11.7–15.7)
LH SERPL-ACNC: 7.1 IU/L
LYMPHOCYTES # BLD AUTO: 2.9 10E9/L (ref 0.8–5.3)
LYMPHOCYTES NFR BLD AUTO: 42.5 %
MCH RBC QN AUTO: 27.4 PG (ref 26.5–33)
MCHC RBC AUTO-ENTMCNC: 32.4 G/DL (ref 31.5–36.5)
MCV RBC AUTO: 85 FL (ref 78–100)
MONOCYTES # BLD AUTO: 0.6 10E9/L (ref 0–1.3)
MONOCYTES NFR BLD AUTO: 9 %
NEUTROPHILS # BLD AUTO: 3.1 10E9/L (ref 1.6–8.3)
NEUTROPHILS NFR BLD AUTO: 45.5 %
PLATELET # BLD AUTO: 188 10E9/L (ref 150–450)
RBC # BLD AUTO: 4.52 10E12/L (ref 3.8–5.2)
WBC # BLD AUTO: 6.7 10E9/L (ref 4–11)

## 2020-05-28 PROCEDURE — 82306 VITAMIN D 25 HYDROXY: CPT | Performed by: INTERNAL MEDICINE

## 2020-05-28 PROCEDURE — 83001 ASSAY OF GONADOTROPIN (FSH): CPT | Performed by: INTERNAL MEDICINE

## 2020-05-28 PROCEDURE — 82670 ASSAY OF TOTAL ESTRADIOL: CPT | Performed by: INTERNAL MEDICINE

## 2020-05-28 PROCEDURE — 82728 ASSAY OF FERRITIN: CPT | Performed by: INTERNAL MEDICINE

## 2020-05-28 PROCEDURE — 36415 COLL VENOUS BLD VENIPUNCTURE: CPT | Performed by: INTERNAL MEDICINE

## 2020-05-28 PROCEDURE — 83002 ASSAY OF GONADOTROPIN (LH): CPT | Performed by: INTERNAL MEDICINE

## 2020-05-28 PROCEDURE — 83540 ASSAY OF IRON: CPT | Performed by: INTERNAL MEDICINE

## 2020-05-28 PROCEDURE — 80050 GENERAL HEALTH PANEL: CPT | Performed by: INTERNAL MEDICINE

## 2020-05-28 PROCEDURE — 83550 IRON BINDING TEST: CPT | Performed by: INTERNAL MEDICINE

## 2020-05-29 LAB
ALBUMIN SERPL-MCNC: 3.3 G/DL (ref 3.4–5)
ALP SERPL-CCNC: 56 U/L (ref 40–150)
ALT SERPL W P-5'-P-CCNC: 27 U/L (ref 0–50)
ANION GAP SERPL CALCULATED.3IONS-SCNC: 5 MMOL/L (ref 3–14)
AST SERPL W P-5'-P-CCNC: 19 U/L (ref 0–45)
BILIRUB SERPL-MCNC: 0.2 MG/DL (ref 0.2–1.3)
BUN SERPL-MCNC: 7 MG/DL (ref 7–30)
CALCIUM SERPL-MCNC: 8.7 MG/DL (ref 8.5–10.1)
CHLORIDE SERPL-SCNC: 107 MMOL/L (ref 94–109)
CO2 SERPL-SCNC: 26 MMOL/L (ref 20–32)
CREAT SERPL-MCNC: 0.61 MG/DL (ref 0.52–1.04)
DEPRECATED CALCIDIOL+CALCIFEROL SERPL-MC: 35 UG/L (ref 20–75)
FERRITIN SERPL-MCNC: 13 NG/ML (ref 12–150)
GFR SERPL CREATININE-BSD FRML MDRD: >90 ML/MIN/{1.73_M2}
GLUCOSE SERPL-MCNC: 107 MG/DL (ref 70–99)
IRON SATN MFR SERPL: 28 % (ref 15–46)
IRON SERPL-MCNC: 88 UG/DL (ref 35–180)
POTASSIUM SERPL-SCNC: 3.8 MMOL/L (ref 3.4–5.3)
PROT SERPL-MCNC: 7 G/DL (ref 6.8–8.8)
SODIUM SERPL-SCNC: 138 MMOL/L (ref 133–144)
TIBC SERPL-MCNC: 311 UG/DL (ref 240–430)
TSH SERPL DL<=0.005 MIU/L-ACNC: 2 MU/L (ref 0.4–4)

## 2020-06-01 VITALS — HEIGHT: 62 IN | WEIGHT: 158 LBS | BODY MASS INDEX: 29.08 KG/M2

## 2020-06-01 ASSESSMENT — MIFFLIN-ST. JEOR: SCORE: 1319.93

## 2020-06-01 NOTE — PATIENT INSTRUCTIONS
Followup appointment on August 25 at 930 AM.  Continue with your current sleep schedule and habits.  I'm glad you are staying connected to friends.  Continue to meditate and wide down in the evening before bed during these stressful times.  Getting up at the same time every morning is usually the most important thing.    Mateus Nino, Óscar  ,LP, [facility]

## 2020-06-01 NOTE — PROGRESS NOTES
"Felicia Bermudez is a 44 year old female who is being evaluated via a billable video visit.      The patient has been notified of following:     \"This video visit will be conducted via a call between you and your physician/provider. We have found that certain health care needs can be provided without the need for an in-person physical exam.  This service lets us provide the care you need with a video conversation.  If a prescription is necessary we can send it directly to your pharmacy.  If lab work is needed we can place an order for that and you can then stop by our lab to have the test done at a later time.    Video visits are billed at different rates depending on your insurance coverage.  Please reach out to your insurance provider with any questions.    If during the course of the call the physician/provider feels a video visit is not appropriate, you will not be charged for this service.\"    Patient has given verbal consent for Video visit? Yes    How would you like to obtain your AVS? Shanhar    Patient would like the video invitation sent by: Send to e-mail at: [email or web address]    Will anyone else be joining your video visit? No      Video-Visit Details    Type of service:  Video Visit    Video Start Time: 8:35 AM  Video End Time: 9:17 AM    Originating Location (pt. Location): Home    Distant Location (provider location):  [facility]     Platform used for Video Visit: [employer]    SLEEP MEDICINE VIRTUAL VIDEO VISIT  Sleep Psychology     Patient Name: Felicia Bermudez  MRN:  [id number]  Date of Service: Jun 2, 2020       Subjective Report     Felicia Bermudez  returns for a telehealth video visit to discuss progress in implementing behavioral strategies for the management of insomnia.  Patient consent for initiation of video visit was obtained and documented prior to initiation of visit.     Felicia reports that despite current stressors in [place] her sleep continues to be fairly good " "overall.  She estimates she is getting about 6 hours of sleep and is not reporting daytime sleepiness.  We reviewed strategies to wind down in the evening, manage stress and maintain stimulus control..     .     Sleep Data:     Source of Data: CBT-I     Sleep Latency: Somewhat extended at 40 min.  Times Awakened: 2  Wake Time After Sleep Onset: Less than 30 min.  Total Sleep Time: 6hours 0 min.  Sleep Efficiency: 80-85 %    Total score - Simpson: 0 .    XIMENA Total Score: 9       Interventions     Strategies and recommendations including stimulus control were discussed today.       Vital Signs     Ht 1.575 m (5' 2\")   Wt 71.7 kg (158 lb)   BMI 28.90 kg/m       Mental Status     Speech/Language:  Normal  Thought Process: Intact  Associations:  Normal  Thought Content: Normal    Diagnostic Impressions and Plan       1. Chronic insomnia  Overall patient sleep continues to be improved despite recent stressors within [place] due to pandemic and social unrest.  Patient has implemented a relaxing bedtime routine and meditates in the evening.  We discussed the psychophysiologic aspects of reducing arousal in the evening and its benefits on sleep quality.  Maintenance visit at 3 months scheduled for August 25 at 9:30 AM        Follow-up: 3 weeks      Mateus Meyers PsyD, LP, DBSM  Diplomate, Behavioral Sleep Medicine  [facility] [place] [facility] [place]                          "

## 2020-06-02 ENCOUNTER — VIRTUAL VISIT (OUTPATIENT)
Dept: SLEEP MEDICINE | Facility: CLINIC | Age: 45
End: 2020-06-02
Payer: COMMERCIAL

## 2020-06-02 DIAGNOSIS — F51.04 CHRONIC INSOMNIA: Primary | ICD-10-CM

## 2020-06-02 PROCEDURE — 90834 PSYTX W PT 45 MINUTES: CPT | Mod: GT | Performed by: PSYCHOLOGIST

## 2020-06-04 ENCOUNTER — TELEPHONE (OUTPATIENT)
Dept: PEDIATRICS | Facility: CLINIC | Age: 45
End: 2020-06-04

## 2020-06-04 ASSESSMENT — ANXIETY QUESTIONNAIRES
IF YOU CHECKED OFF ANY PROBLEMS ON THIS QUESTIONNAIRE, HOW DIFFICULT HAVE THESE PROBLEMS MADE IT FOR YOU TO DO YOUR WORK, TAKE CARE OF THINGS AT HOME, OR GET ALONG WITH OTHER PEOPLE: SOMEWHAT DIFFICULT
GAD7 TOTAL SCORE: 15
6. BECOMING EASILY ANNOYED OR IRRITABLE: NOT AT ALL
5. BEING SO RESTLESS THAT IT IS HARD TO SIT STILL: NOT AT ALL
1. FEELING NERVOUS, ANXIOUS, OR ON EDGE: NEARLY EVERY DAY
7. FEELING AFRAID AS IF SOMETHING AWFUL MIGHT HAPPEN: NEARLY EVERY DAY
2. NOT BEING ABLE TO STOP OR CONTROL WORRYING: NEARLY EVERY DAY
3. WORRYING TOO MUCH ABOUT DIFFERENT THINGS: NEARLY EVERY DAY

## 2020-06-04 ASSESSMENT — PATIENT HEALTH QUESTIONNAIRE - PHQ9
SUM OF ALL RESPONSES TO PHQ QUESTIONS 1-9: 17
5. POOR APPETITE OR OVEREATING: NEARLY EVERY DAY

## 2020-06-04 NOTE — TELEPHONE ENCOUNTER
Patient Returning Call  Reason for call:  Patient wants to know what the next steps will be. She got her lab results but she says she has a lot of body pain, is very emotional, and cannot concentrate. Please call her back regarding symptoms.  Information relayed to patient:  Nurse will call her back.  Patient has additional questions:  Yes  What are your questions/concerns:  Stated above  Okay to leave a detailed message?: Yes at Home number on file [phone] (home)

## 2020-06-04 NOTE — TELEPHONE ENCOUNTER
Called patient.     Per patient:  Saw Dr. Arredondo recently, labs were done.     Feels emotional, has trouble focusing, body pains mostly in neck and shoulder, congestion.   Has been having all of these symptoms for a year.     Has had issues finding depression medications in the past.   PHQ-9 and MALLORIE-7 completed.   Denies thoughts of hurting self, hurting others, or suicide.     Has heavy periods typically.   Comes and goes every two weeks.   Denies current menstrual bleeding.    Takes hydroxyzine sometimes for hives.     Advised patient to schedule video visit with Dr. Arredondo.  Patient agreed. Video visit with Dr. Arredondo scheduled for 6/5/20.     Relayed recommendation from Dr. Arredondo for patient to see OBGYN for issues with bleeding.   Patient reports that she will schedule an appointment if she has trouble in the future.     Chris Pires RN on 6/4/2020 at 3:24 PM

## 2020-06-04 NOTE — TELEPHONE ENCOUNTER
"  Telephone call placed to patient, left a voice message with request for return call.    When patient calls back:  Triage patient symptoms.   Give patient recommendations from Dr. Arredondo lab note from 5/28/20 below:     \"Your thyroid stimulating hormone (TSH), complete blood count electrolytes, liver, and kidney panels, and iron levels all looked great, as did your estrogen levels; no sign of menopause at this point.     If your bleeding persists, I would consider follow up with OBGYN to discuss other options.   Our OBGYNs here at [facility] are at .\"    Chris Pires RN on 6/4/2020 at 2:14 PM    "

## 2020-06-05 ENCOUNTER — VIRTUAL VISIT (OUTPATIENT)
Dept: PEDIATRICS | Facility: CLINIC | Age: 45
End: 2020-06-05
Payer: COMMERCIAL

## 2020-06-05 DIAGNOSIS — F43.21 ADJUSTMENT DISORDER WITH DEPRESSED MOOD: ICD-10-CM

## 2020-06-05 DIAGNOSIS — R41.840 ATTENTION DISTURBANCE: Primary | ICD-10-CM

## 2020-06-05 PROCEDURE — 99214 OFFICE O/P EST MOD 30 MIN: CPT | Mod: GT | Performed by: INTERNAL MEDICINE

## 2020-06-05 RX ORDER — BUPROPION HYDROCHLORIDE 150 MG/1
150 TABLET ORAL EVERY MORNING
Qty: 15 TABLET | Refills: 0 | Status: SHIPPED | OUTPATIENT
Start: 2020-06-05 | End: 2020-07-03

## 2020-06-05 RX ORDER — BUPROPION HYDROCHLORIDE 300 MG/1
300 TABLET ORAL EVERY MORNING
Qty: 30 TABLET | Refills: 0 | Status: SHIPPED | OUTPATIENT
Start: 2020-06-05 | End: 2020-07-03

## 2020-06-05 ASSESSMENT — ANXIETY QUESTIONNAIRES: GAD7 TOTAL SCORE: 15

## 2020-06-05 NOTE — PROGRESS NOTES
"  Felicia Bermudez is a 44 year old female who is being evaluated via a billable video visit.      The patient has been notified of following:     \"This video visit will be conducted via a call between you and your physician/provider. We have found that certain health care needs can be provided without the need for an in-person physical exam.  This service lets us provide the care you need with a video conversation.  If a prescription is necessary we can send it directly to your pharmacy.  If lab work is needed we can place an order for that and you can then stop by our lab to have the test done at a later time.    Video visits are billed at different rates depending on your insurance coverage.  Please reach out to your insurance provider with any questions.    If during the course of the call the physician/provider feels a video visit is not appropriate, you will not be charged for this service.\"    Patient has given verbal consent for Video visit? Yes    How would you like to obtain your AVS? Mail a copy    Patient would like the video invitation sent by: Text to cell phone: [phone]    Will anyone else be joining your video visit? No    Subjective     Felicia Bermudez is a 44 year old female who presents today via video visit for the following health issues:    HPI  Abnormal Mood Symptoms      Duration: since December - started on pristiq by doctor in [place] for sleep concerns patient feels that medication caused her to have depression sx - was crying a lot stopped pristiq in february    Description:  Depression: YES  Anxiety: no   Panic attacks: no      Accompanying signs and symptoms: see PHQ-9 and MALLORIE scores    History (similar episodes/previous evaluation): None    Precipitating or alleviating factors: None    Therapies tried and outcome: none      PHQ 12/27/2019 5/26/2020 6/4/2020   PHQ-9 Total Score 8 10 17   Q9: Thoughts of better off dead/self-harm past 2 weeks Not at all Not at all Not at all "         MALLORIE-7 SCORE 12/27/2019 5/26/2020 6/4/2020   Total Score 6 15 15     Video Start Time: 3:10 PM    Today would like to discuss her sleep and depression.    Ever since was on Pristique, her symptoms of depression have worsened continued.  Has crying, emotionality.  Feels confused and distracted. No motivation.  No suicidal ideation or homicidal ideation.      Was intially given pristique for insomnia, for 4 months.  Different doses did not make much change.      Sleeping now okay; gets 6.5 hours per night.    Does talk with counselor, Dr. Meyers, her sleep specialist, every 2 weeks.  Does meditation.     Does note some confusion and forgetfulness affecting her job.       Patient Active Problem List   Diagnosis     Urticaria     Multiple food allergies     PUD (peptic ulcer disease)     Abnormal Pap smear of cervix     High risk HPV infection     Abnormal weight gain     Insomnia, unspecified type     Menorrhagia with regular cycle     Anxiety     Vitamin D deficiency     Dyspepsia     Migraine without status migrainosus, not intractable     Adenitis     Past Surgical History:   Procedure Laterality Date     DILATION AND CURETTAGE, HYSTEROSCOPY, ABLATE ENDOMETRIUM NOVASURE, COMBINED N/A 3/16/2018    Procedure: COMBINED DILATION AND CURETTAGE, HYSTEROSCOPY, ABLATE ENDOMETRIUM NOVASURE;  Hysteroscopy, Dilation and Curettage, unable to perform Endometrial Ablation with Novasure secondary to uterine perforation, diagnostic laparoscopy, Bilateral Laparoscopic Tubal Ligation ;  Surgeon: Danilo White MD;  Location: [facility] OR     EYE SURGERY      REmoval of mass left eye     LAPAROSCOPIC TUBAL LIGATION Bilateral 3/16/2018    Procedure: LAPAROSCOPIC TUBAL LIGATION;;  Surgeon: Danilo White MD;  Location: [facility] OR     LAPAROSCOPY DIAGNOSTIC (GYN)       SURGICAL HISTORY OF -       2010 turbinate surgery       Social History     Tobacco Use     Smoking status: Never Smoker     Smokeless tobacco: Never Used    Substance Use Topics     Alcohol use: No     Alcohol/week: 0.0 standard drinks     Family History   Problem Relation Age of Onset     Neurologic Disorder Mother 57        brain anuerysm     Hypertension Father      Other Cancer Father 70        Testicle Cancer     Other - See Comments Daughter 21        rare blood disorders     Polycythemia Daughter          Current Outpatient Medications   Medication Sig Dispense Refill     hydrOXYzine (ATARAX) 25 MG tablet Take 1 tablet (25 mg) by mouth every 4 hours as needed For hives 30 tablet 11     IBUPROFEN PO Take 400 mg by mouth every 6 hours as needed        Multiple Vitamins-Minerals (MULTIVITAMIN OR)        Allergies   Allergen Reactions     Bactrim [Sulfamethoxazole W/Trimethoprim]      Tongue swelling     Ambien      Tongue swelling     Amoxicillin      Tongue swelling     Augmentin      Tongue swelling     Avocado Other (See Comments) and Hives     Selling of mouth.     Cucumber Extract Other (See Comments)     Tongue swelling     Iodine Hives     Latex Hives     Lip swelling     Watermelon [Citrullus Vulgaris] Hives     itching     BP Readings from Last 3 Encounters:   02/03/20 124/82   04/29/19 127/77   04/25/19 108/64    Wt Readings from Last 3 Encounters:   06/01/20 71.7 kg (158 lb)   05/18/20 71.7 kg (158 lb)   05/04/20 72.6 kg (160 lb)                    Reviewed and updated as needed this visit by Provider         Review of Systems   CONSTITUTIONAL: NEGATIVE for fever, chills, change in weight  INTEGUMENTARY/SKIN: NEGATIVE for worrisome rashes, moles or lesions  EYES: NEGATIVE for vision changes or irritation  ENT/MOUTH: NEGATIVE for ear, mouth and throat problems  RESP: NEGATIVE for significant cough or SOB  BREAST: NEGATIVE for masses, tenderness or discharge  CV: NEGATIVE for chest pain, palpitations or peripheral edema  GI: NEGATIVE for nausea, abdominal pain, heartburn, or change in bowel habits  : NEGATIVE for frequency, dysuria, or  "hematuria  MUSCULOSKELETAL: NEGATIVE for significant arthralgias or myalgia  NEURO: NEGATIVE for weakness, dizziness or paresthesias  ENDOCRINE: NEGATIVE for temperature intolerance, skin/hair changes  HEME: NEGATIVE for bleeding problems  PSYCHIATRIC: NEGATIVE for changes in mood or affect      Objective    There were no vitals taken for this visit.  Estimated body mass index is 28.9 kg/m  as calculated from the following:    Height as of 6/1/20: 1.575 m (5' 2\").    Weight as of 6/1/20: 71.7 kg (158 lb).  Physical Exam     GENERAL: Healthy, alert and no distress  EYES: Eyes grossly normal to inspection.  No discharge or erythema, or obvious scleral/conjunctival abnormalities.  RESP: No audible wheeze, cough, or visible cyanosis.  No visible retractions or increased work of breathing.    SKIN: Visible skin clear. No significant rash, abnormal pigmentation or lesions.  NEURO: Cranial nerves grossly intact.  Mentation and speech appropriate for age.  PSYCH: Mentation appears normal, affect normal/bright, judgement and insight intact, normal speech and appearance well-groomed.      Diagnostic Test Results:  Labs reviewed in Epic        Assessment & Plan     1. Attention disturbance    Patient Instructions   It was good talking with you earlier today.  Here's a summary of what we discussed:    1)  Let's start on 150 mg of wellbutrin XL for 15 days.  After complete, begin 300 mg daily.    2)  Follow up in 1-2 months for a recheck.    3) let me know before then if you get any side effects.     Jose Daniel Arredondo MD  Internal Medicine and Pediatrics        - buPROPion (WELLBUTRIN XL) 150 MG 24 hr tablet; Take 1 tablet (150 mg) by mouth every morning for 15 days  Dispense: 15 tablet; Refill: 0  - buPROPion (WELLBUTRIN XL) 300 MG 24 hr tablet; Take 1 tablet (300 mg) by mouth every morning Begin after the 150 mg for 15 days is complete.  Dispense: 30 tablet; Refill: 0    2. Adjustment disorder with depressed mood  I do not believe " that the pristiq is related to current symptoms, but likely accompanied her recent descent.  Will continue with counseling and begin slow trial of wellbutrin to see if this also might help her focusing issues at work, which may simply be a result of her depression; no history of ADHD.   - buPROPion (WELLBUTRIN XL) 150 MG 24 hr tablet; Take 1 tablet (150 mg) by mouth every morning for 15 days  Dispense: 15 tablet; Refill: 0  - buPROPion (WELLBUTRIN XL) 300 MG 24 hr tablet; Take 1 tablet (300 mg) by mouth every morning Begin after the 150 mg for 15 days is complete.  Dispense: 30 tablet; Refill: 0       See Patient Instructions    Return in about 2 months (around 8/5/2020) for Followup of today's problem.    Jose Daniel Arredondo MD  [facility]      Video-Visit Details    Type of service:  Video Visit    Video End Time:3:27 PM    Originating Location (pt. Location): Home    Distant Location (provider location):  [facility]     Platform used for Video Visit: ClaribelWell    No follow-ups on file.       Jose Daniel Arredondo MD

## 2020-06-05 NOTE — PATIENT INSTRUCTIONS
It was good talking with you earlier today.  Here's a summary of what we discussed:    1)  Let's start on 150 mg of wellbutrin XL for 15 days.  After complete, begin 300 mg daily.    2)  Follow up in 1-2 months for a recheck.    3) let me know before then if you get any side effects.     Jose Daniel Arredondo MD  Internal Medicine and Pediatrics

## 2020-07-02 NOTE — PROGRESS NOTES
Pre visit planning.     Current visit is one month follow up from Virtual visit with Dr. Arredondo on 5/26/20.     From 5/26/20 Virtual Visit with Dr. Arredondo.   Abnormal menses:  - CBC with platelets and differential; Future  - TSH with free T4 reflex; Future  - **Iron and iron binding capacity FUTURE 2mo; Future  - Ferritin; Future  - Comprehensive metabolic panel (BMP + Alb, Alk Phos, ALT, AST, Total. Bili, TP); Future  - Estradiol; Future  - Lutropin; Future  - Follicle stimulating hormone; Future.  Labs completed and resulted from 5/28/20.    Menorrhagia with regular cycle.  If she does indeed have no evidence of menopause, and if indeed she is anemic despite being on iron tablets and despite her history of endometrial ablation,  I will recommend that she see OB/GYN for a consideration of a hysterectomy.  Has not seen OB/GYN within [place] since 5/26/20.    Chris Pires RN on 7/2/2020 at 10:25 AM

## 2020-07-03 ENCOUNTER — VIRTUAL VISIT (OUTPATIENT)
Dept: PEDIATRICS | Facility: CLINIC | Age: 45
End: 2020-07-03
Payer: COMMERCIAL

## 2020-07-03 DIAGNOSIS — K27.9 PUD (PEPTIC ULCER DISEASE): ICD-10-CM

## 2020-07-03 DIAGNOSIS — L50.9 URTICARIA: ICD-10-CM

## 2020-07-03 DIAGNOSIS — G47.00 INSOMNIA, UNSPECIFIED TYPE: Primary | ICD-10-CM

## 2020-07-03 DIAGNOSIS — F32.A DEPRESSION, UNSPECIFIED DEPRESSION TYPE: ICD-10-CM

## 2020-07-03 PROBLEM — R10.13 DYSPEPSIA: Status: RESOLVED | Noted: 2019-03-18 | Resolved: 2020-07-03

## 2020-07-03 PROCEDURE — 99214 OFFICE O/P EST MOD 30 MIN: CPT | Mod: 95 | Performed by: INTERNAL MEDICINE

## 2020-07-03 RX ORDER — BUPROPION HYDROCHLORIDE 150 MG/1
150 TABLET ORAL EVERY MORNING
Qty: 90 TABLET | Refills: 3 | Status: SHIPPED | OUTPATIENT
Start: 2020-07-03 | End: 2020-10-08

## 2020-07-03 RX ORDER — HYDROXYZINE HYDROCHLORIDE 25 MG/1
25 TABLET, FILM COATED ORAL EVERY 4 HOURS PRN
Qty: 30 TABLET | Refills: 11 | Status: SHIPPED | OUTPATIENT
Start: 2020-07-03 | End: 2022-09-01

## 2020-07-03 ASSESSMENT — PATIENT HEALTH QUESTIONNAIRE - PHQ9
5. POOR APPETITE OR OVEREATING: SEVERAL DAYS
SUM OF ALL RESPONSES TO PHQ QUESTIONS 1-9: 3

## 2020-07-03 ASSESSMENT — ANXIETY QUESTIONNAIRES
3. WORRYING TOO MUCH ABOUT DIFFERENT THINGS: SEVERAL DAYS
6. BECOMING EASILY ANNOYED OR IRRITABLE: NOT AT ALL
IF YOU CHECKED OFF ANY PROBLEMS ON THIS QUESTIONNAIRE, HOW DIFFICULT HAVE THESE PROBLEMS MADE IT FOR YOU TO DO YOUR WORK, TAKE CARE OF THINGS AT HOME, OR GET ALONG WITH OTHER PEOPLE: NOT DIFFICULT AT ALL
2. NOT BEING ABLE TO STOP OR CONTROL WORRYING: NOT AT ALL
5. BEING SO RESTLESS THAT IT IS HARD TO SIT STILL: NOT AT ALL
7. FEELING AFRAID AS IF SOMETHING AWFUL MIGHT HAPPEN: NOT AT ALL
GAD7 TOTAL SCORE: 2
1. FEELING NERVOUS, ANXIOUS, OR ON EDGE: NOT AT ALL

## 2020-07-03 NOTE — PROGRESS NOTES
"Felicia Bermudez is a 44 year old female who is being evaluated via a billable video visit.      The patient has been notified of following:     \"This video visit will be conducted via a call between you and your physician/provider. We have found that certain health care needs can be provided without the need for an in-person physical exam.  This service lets us provide the care you need with a video conversation.  If a prescription is necessary we can send it directly to your pharmacy.  If lab work is needed we can place an order for that and you can then stop by our lab to have the test done at a later time.    Video visits are billed at different rates depending on your insurance coverage.  Please reach out to your insurance provider with any questions.    If during the course of the call the physician/provider feels a video visit is not appropriate, you will not be charged for this service.\"    Patient has given verbal consent for Video visit? Yes  How would you like to obtain your AVS? Mail a copy  Patient would like the video invitation sent by: Text to cell phone: [phone]  Will anyone else be joining your video visit? No    Subjective     Felicia Bermudez is a 44 year old female who presents today via video visit for the following health issues:    Has been on wellbutrin 150; no problems sleeping, normal concentration, less feelings of being overwhelmed; no sadness or fatigue.    When went up to 300 had some constipation, fatigue, and increased heart rate.    Had hemorrhoids and stomach ulcer; was told needed more frequent colonoscopy.     Sleeping better on the 150 than on the 300.   HPI     Depression and Anxiety Follow-Up    How are you doing with your depression since your last visit? Improved, getting back to work routine, pt states Wellbutrin has been helping a lot     How are you doing with your anxiety since your last visit?  Improved     Are you having other symptoms that might be associated with " depression or anxiety? No    Have you had a significant life event? No     Do you have any concerns with your use of alcohol or other drugs? No    Social History     Tobacco Use     Smoking status: Never Smoker     Smokeless tobacco: Never Used   Substance Use Topics     Alcohol use: No     Alcohol/week: 0.0 standard drinks     Drug use: No     PHQ 5/26/2020 6/4/2020 7/3/2020   PHQ-9 Total Score 10 17 3   Q9: Thoughts of better off dead/self-harm past 2 weeks Not at all Not at all Not at all     MALLORIE-7 SCORE 5/26/2020 6/4/2020 7/3/2020   Total Score 15 15 2       .    Suicide Assessment Five-step Evaluation and Treatment (SAFE-T)      How many servings of fruits and vegetables do you eat daily?  4 or more    On average, how many sweetened beverages do you drink each day (Examples: soda, juice, sweet tea, etc.  Do NOT count diet or artificially sweetened beverages)?   0    How many days per week do you exercise enough to make your heart beat faster? 4    How many minutes a day do you exercise enough to make your heart beat faster? 60 or more  How many days per week do you miss taking your medicatio    Video Start Time: 7:45    Still doing counseling with Dr. Meyers; sleep counselor.  Seeing every 3 months now.     Exercises 4 days per week and eating healthy.    Patient Active Problem List   Diagnosis     Urticaria     Multiple food allergies     PUD (peptic ulcer disease)     Abnormal Pap smear of cervix     High risk HPV infection     Abnormal weight gain     Insomnia, unspecified type     Menorrhagia with regular cycle     Anxiety     Vitamin D deficiency     Dyspepsia     Migraine without status migrainosus, not intractable     Adenitis     Past Surgical History:   Procedure Laterality Date     DILATION AND CURETTAGE, HYSTEROSCOPY, ABLATE ENDOMETRIUM NOVASURE, COMBINED N/A 3/16/2018    Procedure: COMBINED DILATION AND CURETTAGE, HYSTEROSCOPY, ABLATE ENDOMETRIUM NOVASURE;  Hysteroscopy, Dilation and Curettage,  unable to perform Endometrial Ablation with Novasure secondary to uterine perforation, diagnostic laparoscopy, Bilateral Laparoscopic Tubal Ligation ;  Surgeon: Danilo White MD;  Location: [facility] OR     EYE SURGERY      REmoval of mass left eye     LAPAROSCOPIC TUBAL LIGATION Bilateral 3/16/2018    Procedure: LAPAROSCOPIC TUBAL LIGATION;;  Surgeon: Danilo White MD;  Location: [facility] OR     LAPAROSCOPY DIAGNOSTIC (GYN)       SURGICAL HISTORY OF -       2010 turbinate surgery       Social History     Tobacco Use     Smoking status: Never Smoker     Smokeless tobacco: Never Used   Substance Use Topics     Alcohol use: No     Alcohol/week: 0.0 standard drinks     Family History   Problem Relation Age of Onset     Neurologic Disorder Mother 57        brain anuerysm     Hypertension Father      Other Cancer Father 70        Testicle Cancer     Other - See Comments Daughter 21        rare blood disorders     Polycythemia Daughter          Current Outpatient Medications   Medication Sig Dispense Refill     buPROPion (WELLBUTRIN XL) 150 MG 24 hr tablet Take 1 tablet (150 mg) by mouth every morning 90 tablet 3     hydrOXYzine (ATARAX) 25 MG tablet Take 1 tablet (25 mg) by mouth every 4 hours as needed For hives 30 tablet 11     IBUPROFEN PO Take 400 mg by mouth every 6 hours as needed        Multiple Vitamins-Minerals (MULTIVITAMIN OR)        Allergies   Allergen Reactions     Bactrim [Sulfamethoxazole W/Trimethoprim]      Tongue swelling     Ambien      Tongue swelling     Amoxicillin      Tongue swelling     Augmentin      Tongue swelling     Avocado Other (See Comments) and Hives     Selling of mouth.     Cucumber Extract Other (See Comments)     Tongue swelling     Iodine Hives     Latex Hives     Lip swelling     Watermelon [Citrullus Vulgaris] Hives     itching     BP Readings from Last 3 Encounters:   02/03/20 124/82   04/29/19 127/77   04/25/19 108/64    Wt Readings from Last 3 Encounters:   06/01/20 71.7 kg  (158 lb)   05/18/20 71.7 kg (158 lb)   05/04/20 72.6 kg (160 lb)                    Reviewed and updated as needed this visit by Provider         Review of Systems   CONSTITUTIONAL: NEGATIVE for fever, chills, change in weight  INTEGUMENTARY/SKIN: NEGATIVE for worrisome rashes, moles or lesions  EYES: NEGATIVE for vision changes or irritation  ENT/MOUTH: NEGATIVE for ear, mouth and throat problems  RESP: NEGATIVE for significant cough or SOB  BREAST: NEGATIVE for masses, tenderness or discharge  CV: NEGATIVE for chest pain, palpitations or peripheral edema  GI: NEGATIVE for nausea, abdominal pain, heartburn, or change in bowel habits  : NEGATIVE for frequency, dysuria, or hematuria  MUSCULOSKELETAL: NEGATIVE for significant arthralgias or myalgia  NEURO: NEGATIVE for weakness, dizziness or paresthesias  ENDOCRINE: NEGATIVE for temperature intolerance, skin/hair changes  HEME: NEGATIVE for bleeding problems  PSYCHIATRIC: NEGATIVE for changes in mood or affect      Objective             Physical Exam     GENERAL: Healthy, alert and no distress  EYES: Eyes grossly normal to inspection.  No discharge or erythema, or obvious scleral/conjunctival abnormalities.  RESP: No audible wheeze, cough, or visible cyanosis.  No visible retractions or increased work of breathing.    SKIN: Visible skin clear. No significant rash, abnormal pigmentation or lesions.  NEURO: Cranial nerves grossly intact.  Mentation and speech appropriate for age.  PSYCH: Mentation appears normal, affect normal/bright, judgement and insight intact, normal speech and appearance well-groomed.      Diagnostic Test Results:  Labs reviewed in Epic        Assessment & Plan     1. Insomnia, unspecified type  Doing well with wellbutin and counseling.  Follow up in 6 months.    buPROPion (WELLBUTRIN XL) 150 MG 24 hr tablet; Take 1 tablet (150 mg) by mouth every morning  Dispense: 90 tablet; Refill: 3    2. Depression, unspecified depression type  As above.      3. Urticaria  Using infrequently.   -  - hydrOXYzine (ATARAX) 25 MG tablet; Take 1 tablet (25 mg) by mouth every 4 hours as needed For hives  Dispense: 30 tablet; Refill: 11    4. PUD (peptic ulcer disease)  Has history of peptic ulcer disease and ? Hemorrhoids.  Will have patient get her last scope results to us and follow up in 6 months.  Hold on colonosocpy unless had actual polyps, which is currently unclear.   - Fecal colorectal cancer screen (FIT); Future       Patient Instructions   It was good talking with you earlier today.  Here's a summary of what we discussed:    1)  Let's decrase your wellbutrin back to 150.  Follow-up in 6 months (January).    2) Let's have you  a FIT test (stool test for hidden blood) and return to us.    3)  I refilled your as needed hydroxyzine.    Jose Daniel Arredondo MD  Internal Medicine and Pediatrics         Return in about 6 months (around 1/3/2021) for Followup of today's problem.    Jose Daniel Arredondo MD  [facility]      Video-Visit Details    Type of service:  Video Visit    Video End Time:8:05 AM    Originating Location (pt. Location): Home    Distant Location (provider location):  [facility]     Platform used for Video Visit: [id number]    No follow-ups on file.       Jose Daniel Arredondo MD

## 2020-07-03 NOTE — PATIENT INSTRUCTIONS
It was good talking with you earlier today.  Here's a summary of what we discussed:    1)  Let's decrase your wellbutrin back to 150.  Follow-up in 6 months (January).    2) Let's have you  a FIT test (stool test for hidden blood) and return to us.    3)  I refilled your as needed hydroxyzine.    Jose Daniel Arredondo MD  Internal Medicine and Pediatrics

## 2020-07-04 ASSESSMENT — ANXIETY QUESTIONNAIRES: GAD7 TOTAL SCORE: 2

## 2020-07-30 DIAGNOSIS — K27.9 PUD (PEPTIC ULCER DISEASE): ICD-10-CM

## 2020-07-30 LAB — HEMOCCULT STL QL IA: NEGATIVE

## 2020-07-30 PROCEDURE — 82274 ASSAY TEST FOR BLOOD FECAL: CPT | Performed by: INTERNAL MEDICINE

## 2020-09-08 ENCOUNTER — TELEPHONE (OUTPATIENT)
Dept: PEDIATRICS | Facility: CLINIC | Age: 45
End: 2020-09-08

## 2020-09-08 NOTE — TELEPHONE ENCOUNTER
General Call:     Who is calling:  Patient    Reason for Call:  Info for work    What are your questions or concerns:  Patient needs what her last cholesterol result was and what her last bp reading was for her work.    Date of last appointment with provider: 7/3/20    Okay to leave a detailed message:Yes at Cell number on file:    Telephone Information:   Mobile [phone]        Kianna Chester,

## 2020-09-08 NOTE — TELEPHONE ENCOUNTER
Called patient and relayed most recent cholesterol results and BP readings.  Danyelle Burns CMA

## 2020-10-07 ENCOUNTER — TELEPHONE (OUTPATIENT)
Dept: PEDIATRICS | Facility: CLINIC | Age: 45
End: 2020-10-07

## 2020-10-07 DIAGNOSIS — L50.9 URTICARIA: ICD-10-CM

## 2020-10-07 DIAGNOSIS — F41.9 ANXIETY: Primary | ICD-10-CM

## 2020-10-07 NOTE — TELEPHONE ENCOUNTER
She can take 1/2 tab for next 2 weeks (75 mg total) and then stop.      We can follow up with her at my next available appointment.    Jose Daniel Arredondo MD  Internal Medicine and Pediatrics

## 2020-10-07 NOTE — TELEPHONE ENCOUNTER
Called and gave message and she is concerned about cutting in half.  Informed her to call us if unable to cut in half.  Katelyn Hawley LPN

## 2020-10-07 NOTE — TELEPHONE ENCOUNTER
She has been taking Wellbutrin 150mg for 3 months. She is having some side effects that she does not like.  She has SOB, heart racing and she feels like it is irritating her stomach. It has gradually gotten worse as she is taking it. Denies chest pain. She said she had similar problems when she increased from 150-300. Did ok for a bit when she went back down to 150, but know she does not like how it is making her feel. She wonders if she should try a different medication.     She does not have insurance at the moment so she can't pay to come in for appt.     Can you change her to something else?  Katie Ordaz, RN on 10/7/2020 at 3:39 PM

## 2020-10-07 NOTE — TELEPHONE ENCOUNTER
Dr. Arredondo,     The Pt is unable to split her pills as they are not scored and she was advised by her pharmacist not to split them as they are extended release.   When she attempted to split the pills, they broke into multiple pieces.     Katharine pended a 75 mg tablet (not extended release) with 7 tabs total. Please advise.     Tracy Parsons, RN   [facility] -- Triage Nurse

## 2020-10-07 NOTE — TELEPHONE ENCOUNTER
buPROPion (WELLBUTRIN XL) 150 MG 24 hr tablet is making PT's heart race, SOB, giving her stomach issues, she also feels distracted. Needs to place refill order as her last tablet was yesterday, but wants to be on something else.

## 2020-10-07 NOTE — TELEPHONE ENCOUNTER
Patient calling back.  Unable to cut in half.  Wants to speak to a nurse.  She can be reached at [phone]

## 2020-10-08 RX ORDER — BUPROPION HYDROCHLORIDE 75 MG/1
75 TABLET ORAL DAILY
Qty: 14 TABLET | Refills: 0 | Status: SHIPPED | OUTPATIENT
Start: 2020-10-08 | End: 2020-10-23

## 2020-12-20 ENCOUNTER — HEALTH MAINTENANCE LETTER (OUTPATIENT)
Age: 45
End: 2020-12-20

## 2021-02-28 ENCOUNTER — HEALTH MAINTENANCE LETTER (OUTPATIENT)
Age: 46
End: 2021-02-28

## 2021-10-03 ENCOUNTER — HEALTH MAINTENANCE LETTER (OUTPATIENT)
Age: 46
End: 2021-10-03

## 2021-11-12 ENCOUNTER — HOSPITAL ENCOUNTER (OUTPATIENT)
Dept: MAMMOGRAPHY | Facility: CLINIC | Age: 46
Discharge: HOME | End: 2021-11-12
Attending: INTERNAL MEDICINE
Payer: COMMERCIAL

## 2021-11-12 DIAGNOSIS — Z12.31 VISIT FOR SCREENING MAMMOGRAM: ICD-10-CM

## 2021-11-12 PROCEDURE — 77063 BREAST TOMOSYNTHESIS BI: CPT

## 2022-01-23 ENCOUNTER — HEALTH MAINTENANCE LETTER (OUTPATIENT)
Age: 47
End: 2022-01-23

## 2022-02-11 NOTE — MR AVS SNAPSHOT
"              After Visit Summary   6/22/2018    Felicia Bermudez    MRN: [id number]           Patient Information     Date Of Birth          1975        Visit Information        Provider Department      6/22/2018 4:15 PM Jeanette Hudson NP [facility]        Today's Diagnoses     Anxiety    -  1       Follow-ups after your visit        Your next 10 appointments already scheduled     Jun 25, 2018  5:45 PM CDT   SHORT with Leena Dsouza CNM   [facility] ([facility])    [address] [id number] [phone]              Who to contact     If you have questions or need follow up information about today's clinic visit or your schedule please contact [facility] directly at [phone].  Normal or non-critical lab and imaging results will be communicated to you by [employer], letter or phone within 4 business days after the clinic has received the results. If you do not hear from us within 7 days, please contact the clinic through MyChart or phone. If you have a critical or abnormal lab result, we will notify you by phone as soon as possible.  Submit refill requests through IfOnly or call your pharmacy and they will forward the refill request to us. Please allow 3 business days for your refill to be completed.          Additional Information About Your Visit        MyChart Information     IfOnly lets you send messages to your doctor, view your test results, renew your prescriptions, schedule appointments and more. To sign up, go to [email or web address] . Click on \"Log in\" on the left side of the screen, which will take you to the Welcome page. Then click on \"Sign up Now\" on the right side of the page.     You will be asked to enter the access code listed below, as well as some personal information. Please follow the directions to create your username and password.     Your access code is: " [email or web address]  Expires: 2018  4:37 PM     Your access code will  in 90 days. If you need help or a new code, please call your [facility] clinic or [phone].        Care EveryWhere ID     This is your Care EveryWhere ID. This could be used by other organizations to access your [facility] medical records  [id number]        Your Vitals Were     Temperature Last Period Breastfeeding?             98.6  F (37  C) (Oral) 2018 No          Blood Pressure from Last 3 Encounters:   18 118/72   18 100/62   18 122/60    Weight from Last 3 Encounters:   18 159 lb (72.1 kg)   18 157 lb (71.2 kg)   18 154 lb (69.9 kg)              Today, you had the following     No orders found for display         Today's Medication Changes          These changes are accurate as of 18  4:52 PM.  If you have any questions, ask your nurse or doctor.               Start taking these medicines.        Dose/Directions    FLUoxetine 10 MG capsule   Commonly known as:  PROzac   Used for:  Anxiety   Started by:  Jeanette Hudson NP        Dose:  10 mg   Take 1 capsule (10 mg) by mouth daily   Quantity:  30 capsule   Refills:  0            Where to get your medicines      [facility] [address] [place] [address] [address] [facility] [address]     Phone:  [phone]     FLUoxetine 10 MG capsule                Primary Care Provider Office Phone # Fax #    Jose Daniel Arredondo [phone] [address] [place] [address]        Equal Access to Services     [facility] GUNNAR AH: Hadcherise Henriquez, mata leigh, stacia mcleanaldemetri orosco. So [facility] [phone].    ATENCIÓN: Si habla español, tiene a paige disposición servicios gratuitos de asistencia lingüística. Llame al [phone].    We comply with applicable federal civil  rights laws and [place] laws. We do not discriminate on the basis of race, color, national origin, age, disability, sex, sexual orientation, or gender identity.            Thank you!     Thank you for choosing [facility]  for your care. Our goal is always to provide you with excellent care. Hearing back from our patients is one way we can continue to improve our services. Please take a few minutes to complete the written survey that you may receive in the mail after your visit with us. Thank you!             Your Updated Medication List - Protect others around you: Learn how to safely use, store and throw away your medicines at www.disposemymeds.org.          This list is accurate as of 6/22/18  4:52 PM.  Always use your most recent med list.                   Brand Name Dispense Instructions for use Diagnosis    busPIRone 5 MG tablet    BUSPAR    30 tablet    Take 1 tablet (5 mg) by mouth 2 times daily    Anxiety       cetirizine 10 MG tablet    zyrTEC    30 tablet    Take 1 tablet (10 mg) by mouth every evening    Multiple allergies       FLUoxetine 10 MG capsule    PROzac    30 capsule    Take 1 capsule (10 mg) by mouth daily    Anxiety       HYDROXYZINE HCL PO      Take 20 mg by mouth every 4 hours as needed For hives        IBUPROFEN PO      Take 400 mg by mouth every 6 hours as needed        MULTIVITAMIN PO           vitamin D 66329 UNIT capsule    ERGOCALCIFEROL    4 capsule    Take 1 capsule (50,000 Units) by mouth every 7 days for 12 doses    Vitamin D deficiency          ETOH use/Substance misuse

## 2022-02-22 ENCOUNTER — OFFICE VISIT (OUTPATIENT)
Dept: URGENT CARE | Facility: URGENT CARE | Age: 47
End: 2022-02-22
Payer: COMMERCIAL

## 2022-02-22 VITALS
OXYGEN SATURATION: 100 % | TEMPERATURE: 98.4 F | RESPIRATION RATE: 16 BRPM | HEART RATE: 67 BPM | DIASTOLIC BLOOD PRESSURE: 78 MMHG | SYSTOLIC BLOOD PRESSURE: 123 MMHG

## 2022-02-22 DIAGNOSIS — H65.93 MIDDLE EAR EFFUSION, BILATERAL: ICD-10-CM

## 2022-02-22 DIAGNOSIS — J01.90 ACUTE NON-RECURRENT SINUSITIS, UNSPECIFIED LOCATION: Primary | ICD-10-CM

## 2022-02-22 PROCEDURE — 99214 OFFICE O/P EST MOD 30 MIN: CPT | Performed by: PHYSICIAN ASSISTANT

## 2022-02-22 RX ORDER — LEVOFLOXACIN 750 MG/1
750 TABLET, FILM COATED ORAL DAILY
Qty: 5 TABLET | Refills: 0 | Status: SHIPPED | OUTPATIENT
Start: 2022-02-22 | End: 2022-02-27

## 2022-02-22 NOTE — PATIENT INSTRUCTIONS
1.  Take antibiotic according to instructions, with food to prevent stomach upset. If you are prone to stomach upset with antibiotics, I recommend adding a probiotic to this regimen.  Culturelle is a trusted brand.  2.  I recommend using Mucinex to help thin mucus secretions.  Adding a nasal steroid spray such as Flonase can also be helpful with clearing sinus congestion.  3.  Take Tylenol 650mg every 4 hours or ibuprofen 600mg every 6 hours by mouth for pain/fever.  Do not exceed 4000mg of acetaminophen or 2400mg of ibuprofen from any source in a 24 hour period.  Taking Tylenol and ibuprofen together may be helpful in reducing pain.   4.  Follow-up if you not having any improvement in your symptoms over the next 5 days.    Sinusitis  The sinuses are air-filled spaces within the bones of the face. They connect to the inside of the nose. Sinusitis is an inflammation of the tissue that lines the sinuses. Sinusitis can occur during a cold. It can also happen due to allergies to pollens and other particles in the air. Sinusitis can cause symptoms of sinus congestion and a feeling of fullness. A sinus infection causes fever, headache, and facial pain. There is often green or yellow fluid draining from the nose or into the back of the throat (post-nasal drip). You have been given antibiotics to treat this condition.  Home care    Take the full course of antibiotics as instructed. Do not stop taking them, even when you feel better.    Drink plenty of water, hot tea, and other liquids. This may help thin nasal mucus. It also may help your sinuses drain fluids.    Heat may help soothe painful areas of your face. Use a towel soaked in hot water. Or,  the shower and direct the warm spray onto your face. Using a vaporizer along with a menthol rub at night may also help soothe symptoms.     An expectorant with guaifenesin may help thin nasal mucus and help your sinuses drain fluids.    You can use an over-the-counter  decongestant, unless a similar medicine was prescribed to you. Nasal sprays work the fastest. Use one that contains phenylephrine or oxymetazoline. First blow your nose gently. Then use the spray. Do not use these medicines more often than directed on the label. If you do, your symptoms may get worse. You may also take pills that contain pseudoephedrine. Don t use products that combine multiple medicines. This is because side effects may be increased. Read labels. You can also ask the pharmacist for help. (People with high blood pressure should not use decongestants. They can raise blood pressure.)    Over-the-counter antihistamines may help if allergies contributed to your sinusitis.      Do not use nasal rinses or irrigation during an acute sinus infection, unless your healthcare provider tells you to. Rinsing may spread the infection to other areas in your sinuses.    Use acetaminophen or ibuprofen to control pain, unless another pain medicine was prescribed to you. If you have chronic liver or kidney disease or ever had a stomach ulcer, talk with your healthcare provider before using these medicines. (Aspirin should never be taken by anyone under age 18 who is ill with a fever. It may cause severe liver damage.)    Don't smoke. This can make symptoms worse.  Follow-up care  Follow up with your healthcare provider or our staff if you are better in 1 week.  When to seek medical advice  Call your healthcare provider if any of these occur:    Facial pain or headache that gets worse    Stiff neck    Unusual drowsiness or confusion    Swelling of your forehead or eyelids    Vision problems, such as blurred or double vision    Fever of 100.4 F (38 C) or higher, or as directed by your healthcare provider    Seizure    Breathing problems    Symptoms don't go away in 10 days  Prevention  Here are steps you can take to help prevent an infection:    Keep good hand washing habits.    Don t have close contact with people who  have sore throats, colds, or other upper respiratory infections.    Don t smoke, and stay away from secondhand smoke.    Stay up to date with of your vaccines.  Date Last Reviewed: 11/1/2017 2000-2017 [employer]. [address] [place] [address], [place] [address]. All rights reserved. This information is not intended as a substitute for professional medical care. Always follow your healthcare professional's instructions.

## 2022-02-22 NOTE — PROGRESS NOTES
Assessment/Plan:    Given sinus tenderness and prolonged symptoms, suspect acute bacterial sinusitis; will treat with antibiotics. Pt has extensive allergy list and does not feel that azithromycin helps her symptoms. Will Rx Levaquin, potential adverse effects discussed with pt.   No sign of AOM/OE; pt has bilateral middle ear effusion. Continue Flonase.   Due to recurrent sinus issues, pt would like to see a different ENT provider for a second opinion. Referral placed.    See patient instructions below.  At the end of the encounter, I discussed results, diagnosis, medications. Discussed red flags for immediate return to clinic/ER, as well as indications for follow up if no improvement. Patient understood and agreed to plan. Patient was stable for discharge.      ICD-10-CM    1. Acute non-recurrent sinusitis, unspecified location  J01.90 levofloxacin (LEVAQUIN) 750 MG tablet     Otolaryngology Referral   2. Middle ear effusion, bilateral  H65.93          Return in about 1 week (around 3/1/2022) for Follow up w/ primary care provider if not better.    LARISA Rose, COLLEEN  [facility]    ------------------------------------------------------------------------------------------------------------------------------------------------------------------------  HPI:  Felicia Bermudez is a 46 year old female who presents for evaluation of sinus pressure & nasal congestion onset 2 weeks ago, with bilateral ears feeling plugged & ear pain for the past 4 days. Pt states she has chronic nasal congestion/sinus issues and was recently treated for sinusitis with azithromycin about 2 months ago but felt the symptoms never fully resolved. She has seen allergists and ENT for her sinus issues, had sinus surgery, but nothing seems to help. She uses Flonase daily. Patient reports no fever/chills, myalgias, cough, sore throat, loss of sense of taste or smell, headache, chest pain, shortness of breath,  abdominal pain, nausea, vomiting, diarrhea, rash, or any other symptoms. No known sick contacts/COVID exposure.       Past Medical History:   Diagnosis Date     Abnormal weight gain 2/28/2017     Adenitis 4/30/2019     Anxiety 6/9/2018     Arthritis      Dyspepsia 3/18/2019     High risk HPV infection 08/03/15    NIL, +HPV 18, plan colp     History of colposcopy 11/16/15    No bx taken     Hives     multiple allergies - food, environmental     Insomnia      Migraine without status migrainosus, not intractable 4/29/2019       Vitals:    02/22/22 1716   BP: 123/78   Pulse: 67   Resp: 16   Temp: 98.4  F (36.9  C)   SpO2: 100%       Physical Exam  Vitals and nursing note reviewed.   HENT:      Right Ear: A middle ear effusion is present. Tympanic membrane is not erythematous or bulging.      Left Ear: A middle ear effusion is present. Tympanic membrane is not erythematous or bulging.      Nose:      Right Sinus: Maxillary sinus tenderness present.      Left Sinus: Maxillary sinus tenderness present.      Mouth/Throat:      Mouth: Mucous membranes are moist.      Pharynx: Oropharynx is clear.   Cardiovascular:      Rate and Rhythm: Normal rate and regular rhythm.      Heart sounds: Normal heart sounds.   Pulmonary:      Effort: Pulmonary effort is normal.      Breath sounds: Normal breath sounds.   Neurological:      Mental Status: She is alert.         Labs/Imaging:  No results found for this or any previous visit (from the past 24 hour(s)).      Patient Instructions   1.  Take antibiotic according to instructions, with food to prevent stomach upset. If you are prone to stomach upset with antibiotics, I recommend adding a probiotic to this regimen.  Culturelle is a trusted brand.  2.  I recommend using Mucinex to help thin mucus secretions.  Adding a nasal steroid spray such as Flonase can also be helpful with clearing sinus congestion.  3.  Take Tylenol 650mg every 4 hours or ibuprofen 600mg every 6 hours by mouth for  pain/fever.  Do not exceed 4000mg of acetaminophen or 2400mg of ibuprofen from any source in a 24 hour period.  Taking Tylenol and ibuprofen together may be helpful in reducing pain.   4.  Follow-up if you not having any improvement in your symptoms over the next 5 days.    Sinusitis  The sinuses are air-filled spaces within the bones of the face. They connect to the inside of the nose. Sinusitis is an inflammation of the tissue that lines the sinuses. Sinusitis can occur during a cold. It can also happen due to allergies to pollens and other particles in the air. Sinusitis can cause symptoms of sinus congestion and a feeling of fullness. A sinus infection causes fever, headache, and facial pain. There is often green or yellow fluid draining from the nose or into the back of the throat (post-nasal drip). You have been given antibiotics to treat this condition.  Home care    Take the full course of antibiotics as instructed. Do not stop taking them, even when you feel better.    Drink plenty of water, hot tea, and other liquids. This may help thin nasal mucus. It also may help your sinuses drain fluids.    Heat may help soothe painful areas of your face. Use a towel soaked in hot water. Or,  the shower and direct the warm spray onto your face. Using a vaporizer along with a menthol rub at night may also help soothe symptoms.     An expectorant with guaifenesin may help thin nasal mucus and help your sinuses drain fluids.    You can use an over-the-counter decongestant, unless a similar medicine was prescribed to you. Nasal sprays work the fastest. Use one that contains phenylephrine or oxymetazoline. First blow your nose gently. Then use the spray. Do not use these medicines more often than directed on the label. If you do, your symptoms may get worse. You may also take pills that contain pseudoephedrine. Don t use products that combine multiple medicines. This is because side effects may be increased. Read  labels. You can also ask the pharmacist for help. (People with high blood pressure should not use decongestants. They can raise blood pressure.)    Over-the-counter antihistamines may help if allergies contributed to your sinusitis.      Do not use nasal rinses or irrigation during an acute sinus infection, unless your healthcare provider tells you to. Rinsing may spread the infection to other areas in your sinuses.    Use acetaminophen or ibuprofen to control pain, unless another pain medicine was prescribed to you. If you have chronic liver or kidney disease or ever had a stomach ulcer, talk with your healthcare provider before using these medicines. (Aspirin should never be taken by anyone under age 18 who is ill with a fever. It may cause severe liver damage.)    Don't smoke. This can make symptoms worse.  Follow-up care  Follow up with your healthcare provider or our staff if you are better in 1 week.  When to seek medical advice  Call your healthcare provider if any of these occur:    Facial pain or headache that gets worse    Stiff neck    Unusual drowsiness or confusion    Swelling of your forehead or eyelids    Vision problems, such as blurred or double vision    Fever of 100.4 F (38 C) or higher, or as directed by your healthcare provider    Seizure    Breathing problems    Symptoms don't go away in 10 days  Prevention  Here are steps you can take to help prevent an infection:    Keep good hand washing habits.    Don t have close contact with people who have sore throats, colds, or other upper respiratory infections.    Don t smoke, and stay away from secondhand smoke.    Stay up to date with of your vaccines.  Date Last Reviewed: 11/1/2017 2000-2017 [employer]. [address] [place] [address], [place] [address]. All rights reserved. This information is not intended as a substitute for professional medical care. Always follow your healthcare professional's instructions.

## 2022-09-01 ENCOUNTER — OFFICE VISIT (OUTPATIENT)
Dept: OBGYN | Facility: CLINIC | Age: 47
End: 2022-09-01
Payer: COMMERCIAL

## 2022-09-01 ENCOUNTER — TELEPHONE (OUTPATIENT)
Dept: OBGYN | Facility: CLINIC | Age: 47
End: 2022-09-01

## 2022-09-01 VITALS
DIASTOLIC BLOOD PRESSURE: 62 MMHG | BODY MASS INDEX: 27.49 KG/M2 | WEIGHT: 161 LBS | HEIGHT: 64 IN | SYSTOLIC BLOOD PRESSURE: 106 MMHG

## 2022-09-01 DIAGNOSIS — N90.813 FEMALE GENITAL INFIBULATION: ICD-10-CM

## 2022-09-01 DIAGNOSIS — Z11.3 ROUTINE SCREENING FOR STI (SEXUALLY TRANSMITTED INFECTION): Primary | ICD-10-CM

## 2022-09-01 DIAGNOSIS — F51.01 PRIMARY INSOMNIA: ICD-10-CM

## 2022-09-01 DIAGNOSIS — N93.9 ABNORMAL UTERINE BLEEDING: ICD-10-CM

## 2022-09-01 DIAGNOSIS — N94.10 DYSPAREUNIA, FEMALE: ICD-10-CM

## 2022-09-01 DIAGNOSIS — R10.2 PELVIC PAIN IN FEMALE: ICD-10-CM

## 2022-09-01 DIAGNOSIS — Z12.4 SCREENING FOR CERVICAL CANCER: ICD-10-CM

## 2022-09-01 DIAGNOSIS — L50.9 URTICARIA: ICD-10-CM

## 2022-09-01 PROBLEM — N92.1 METRORRHAGIA: Status: ACTIVE | Noted: 2022-09-01

## 2022-09-01 PROBLEM — N95.9 MENOPAUSAL AND POSTMENOPAUSAL DISORDER: Status: ACTIVE | Noted: 2022-09-01

## 2022-09-01 PROBLEM — D50.9 IRON DEFICIENCY ANEMIA: Status: ACTIVE | Noted: 2022-09-01

## 2022-09-01 PROBLEM — G93.32 CHRONIC FATIGUE SYNDROME: Status: ACTIVE | Noted: 2022-09-01

## 2022-09-01 PROBLEM — N92.0 MENORRHAGIA: Status: ACTIVE | Noted: 2017-09-12

## 2022-09-01 PROBLEM — G47.9 DIFFICULTY SLEEPING: Status: ACTIVE | Noted: 2022-09-01

## 2022-09-01 PROBLEM — N92.6 ABNORMAL MENSTRUAL CYCLE: Status: ACTIVE | Noted: 2022-09-01

## 2022-09-01 PROCEDURE — 87624 HPV HI-RISK TYP POOLED RSLT: CPT | Performed by: OBSTETRICS & GYNECOLOGY

## 2022-09-01 PROCEDURE — G0145 SCR C/V CYTO,THINLAYER,RESCR: HCPCS | Performed by: OBSTETRICS & GYNECOLOGY

## 2022-09-01 PROCEDURE — 99204 OFFICE O/P NEW MOD 45 MIN: CPT | Performed by: OBSTETRICS & GYNECOLOGY

## 2022-09-01 PROCEDURE — 87491 CHLMYD TRACH DNA AMP PROBE: CPT | Performed by: OBSTETRICS & GYNECOLOGY

## 2022-09-01 PROCEDURE — 87591 N.GONORRHOEAE DNA AMP PROB: CPT | Performed by: OBSTETRICS & GYNECOLOGY

## 2022-09-01 PROCEDURE — 87340 HEPATITIS B SURFACE AG IA: CPT | Performed by: OBSTETRICS & GYNECOLOGY

## 2022-09-01 PROCEDURE — 36415 COLL VENOUS BLD VENIPUNCTURE: CPT | Performed by: OBSTETRICS & GYNECOLOGY

## 2022-09-01 PROCEDURE — 86803 HEPATITIS C AB TEST: CPT | Performed by: OBSTETRICS & GYNECOLOGY

## 2022-09-01 PROCEDURE — 87389 HIV-1 AG W/HIV-1&-2 AB AG IA: CPT | Performed by: OBSTETRICS & GYNECOLOGY

## 2022-09-01 PROCEDURE — 86780 TREPONEMA PALLIDUM: CPT | Performed by: OBSTETRICS & GYNECOLOGY

## 2022-09-01 RX ORDER — ERGOCALCIFEROL (VITAMIN D2) 50 MCG
CAPSULE ORAL
COMMUNITY
End: 2023-08-29

## 2022-09-01 RX ORDER — IRON,CARBONYL/ASCORBIC ACID 100-250 MG
TABLET ORAL
COMMUNITY
End: 2023-08-29

## 2022-09-01 RX ORDER — HYDROXYZINE HYDROCHLORIDE 25 MG/1
25 TABLET, FILM COATED ORAL EVERY 4 HOURS PRN
Qty: 30 TABLET | Refills: 11 | Status: SHIPPED | OUTPATIENT
Start: 2022-09-01 | End: 2023-10-16

## 2022-09-01 RX ORDER — ASCORBIC ACID 100 MG
TABLET,CHEWABLE ORAL
COMMUNITY
End: 2022-11-11

## 2022-09-01 RX ORDER — MEDROXYPROGESTERONE ACETATE 10 MG
TABLET ORAL
COMMUNITY
Start: 2021-11-11 | End: 2022-11-11

## 2022-09-01 RX ORDER — OMEGA-3 FATTY ACIDS/FISH OIL 300-1000MG
CAPSULE ORAL
COMMUNITY
End: 2023-08-29

## 2022-09-01 ASSESSMENT — ANXIETY QUESTIONNAIRES
1. FEELING NERVOUS, ANXIOUS, OR ON EDGE: SEVERAL DAYS
GAD7 TOTAL SCORE: 4
7. FEELING AFRAID AS IF SOMETHING AWFUL MIGHT HAPPEN: NOT AT ALL
GAD7 TOTAL SCORE: 4
2. NOT BEING ABLE TO STOP OR CONTROL WORRYING: SEVERAL DAYS
5. BEING SO RESTLESS THAT IT IS HARD TO SIT STILL: NOT AT ALL
IF YOU CHECKED OFF ANY PROBLEMS ON THIS QUESTIONNAIRE, HOW DIFFICULT HAVE THESE PROBLEMS MADE IT FOR YOU TO DO YOUR WORK, TAKE CARE OF THINGS AT HOME, OR GET ALONG WITH OTHER PEOPLE: NOT DIFFICULT AT ALL
3. WORRYING TOO MUCH ABOUT DIFFERENT THINGS: SEVERAL DAYS
6. BECOMING EASILY ANNOYED OR IRRITABLE: NOT AT ALL

## 2022-09-01 ASSESSMENT — PATIENT HEALTH QUESTIONNAIRE - PHQ9
5. POOR APPETITE OR OVEREATING: SEVERAL DAYS
SUM OF ALL RESPONSES TO PHQ QUESTIONS 1-9: 0

## 2022-09-01 NOTE — TELEPHONE ENCOUNTER
Seen today in clinic w Dr Diaz  Hydroxyzine 25mg Rx sent - only Rx - pt received this    Asking about the medication her and Joe discussed in visit today - Quetiapine and has not been sent. Asking if Dr Diaz does not send it if we could ask her PCP Dr Arredondo to refill it for her. She has not seen Dr Arredondo in over 2 years so she would need a visit with him before receives any refills.    Pt hoping Joe approves refill and message her either way about the Rx.    Sangeetha Lomas RN on 9/1/2022 at 2:32 PM

## 2022-09-01 NOTE — TELEPHONE ENCOUNTER
It's actually not Quetiapine it is a new medication for insomnia. I am still looking into it. I will likely send it for her but will do a limited supply as she has many intolerances and this medication was just approved this year!

## 2022-09-01 NOTE — PROGRESS NOTES
"  SUBJECTIVE:                                                   Felicia Bermudez is a 46 year old female who presents to clinic today for the following health issue(s):  Patient presents with:  Consult    HPI:  Felicia presents with \"many\" complaints. She has a history of abnormal uterine bleeding with a failed attempt at an endometrial ablation in 2018. She had a bilateral salpingectomy and reports menses that are heavy every few weeks. She has life disrupting bleeding that limits her activities. She has used the IUD in the past but states it caused an abnormal pap smear. Then she used a progesterone implant and she states that it caused breast cysts. She tried OCPs for many years and it controlled her menses. She has insertional and deep dyspareunia. She has female genital infibulation. She states that her  left her due to dyspareunia. She has had a prior laparoscopy and was negative for endometriosis. She had recent work up with [facility] and had a negative pelvic ultrasound.  She has persistent insomnia and has tried and not responded to every single indicated medication. Most of them gave her anxiety or did not work. She heard about Quyviviq and would like to try it.    Patient's last menstrual period was 2022 (exact date)..     Patient is sexually active, .  Using tubal ligation for contraception.    reports that she has never smoked. She has never used smokeless tobacco.    STD testing offered?  Declined    Health maintenance updated:  no    Today's PHQ-2 Score:   PHQ-2 (  [employer]) 2022   Q1: Little interest or pleasure in doing things 0   Q2: Feeling down, depressed or hopeless 0   PHQ-2 Score 0   PHQ-2 Total Score (12-17 Years)- Positive if 3 or more points; Administer PHQ-A if positive -   Q1: Little interest or pleasure in doing things -   Q2: Feeling down, depressed or hopeless -   PHQ-2 Score -     Today's PHQ-9 Score:   PHQ-9 SCORE 2022   PHQ-9 Total Score 0     Today's " MALLORIE-7 Score:   MALLORIE-7 SCORE 9/1/2022   Total Score 4       Problem list and histories reviewed & adjusted, as indicated.  Additional history: as documented.    Patient Active Problem List   Diagnosis     Urticaria     Multiple food allergies     PUD (peptic ulcer disease)     Abnormal Pap smear of cervix     High risk HPV infection     Abnormal weight gain     Insomnia, unspecified type     Menorrhagia     Anxiety     Vitamin D deficiency     Migraine without status migrainosus, not intractable     Adenitis     Abnormal menstrual cycle     Abnormal uterine bleeding     Abnormal vaginal bleeding     Chronic fatigue syndrome     Menopausal and postmenopausal disorder     Difficulty sleeping     Iron deficiency anemia     Metrorrhagia     Past Surgical History:   Procedure Laterality Date     DILATION AND CURETTAGE, HYSTEROSCOPY, ABLATE ENDOMETRIUM NOVASURE, COMBINED N/A 3/16/2018    Procedure: COMBINED DILATION AND CURETTAGE, HYSTEROSCOPY, ABLATE ENDOMETRIUM NOVASURE;  Hysteroscopy, Dilation and Curettage, unable to perform Endometrial Ablation with Novasure secondary to uterine perforation, diagnostic laparoscopy, Bilateral Laparoscopic Tubal Ligation ;  Surgeon: Danilo White MD;  Location: [facility] OR     EYE SURGERY      REmoval of mass left eye     LAPAROSCOPIC TUBAL LIGATION Bilateral 3/16/2018    Procedure: LAPAROSCOPIC TUBAL LIGATION;;  Surgeon: Danilo White MD;  Location: [facility] OR     LAPAROSCOPY DIAGNOSTIC (GYN)       SURGICAL HISTORY OF -       2010 turbinate surgery      Social History     Tobacco Use     Smoking status: Never Smoker     Smokeless tobacco: Never Used   Substance Use Topics     Alcohol use: No     Alcohol/week: 0.0 standard drinks      Problem (# of Occurrences) Relation (Name,Age of Onset)    Diabetes (2) Father, Paternal Grandmother    Hypertension (1) Father    Neurologic Disorder (1) Mother (57): brain anuerysm    Other - See Comments (1) Daughter (21): rare blood disorders    Other  "Cancer (1) Father (70): Testicle Cancer    Polycythemia (1) Daughter            Current Outpatient Medications   Medication Sig     buPROPion (WELLBUTRIN) 75 MG tablet Take 1 tablet (75 mg) by mouth daily     hydrOXYzine (ATARAX) 25 MG tablet Take 1 tablet (25 mg) by mouth every 4 hours as needed for itching For hives     ibuprofen (ADVIL/MOTRIN) 200 MG capsule      IBUPROFEN PO Take 400 mg by mouth every 6 hours as needed      Iron-Vitamin C 100-250 MG TABS      medroxyPROGESTERone (PROVERA) 10 MG tablet 1 tab     Multiple Vitamins-Minerals (MULTIVITAMIN ADULT, MINERALS, PO)      Multiple Vitamins-Minerals (MULTIVITAMIN OR)      Phenol-Glycerin (VICKS 44 SORE THROAT MT)      Vitamin D2, Ergocalciferol, 50 MCG (2000 UT) CAPS      Ascorbic Acid (VITAMIN C) 100 MG CHEW      No current facility-administered medications for this visit.     Allergies   Allergen Reactions     Bactrim [Sulfamethoxazole W/Trimethoprim]      Tongue swelling     Ambien      Tongue swelling     Amoxicillin      Tongue swelling     Avocado Other (See Comments) and Hives     Selling of mouth.     Cucumber Extract Other (See Comments)     Tongue swelling     Doxycycline Hives and Itching     Iodine Hives     Latex Hives     Lip swelling     Sulfa Drugs Hives     Watermelon [Citrullus Vulgaris] Hives     itching     Zolpidem Unknown     Augmentin Rash     Tongue swelling     Diagnostic X-Ray Materials Rash     Eszopiclone Swelling and Rash     Tongue swelling       ROS:  12 point review of systems negative other than symptoms noted below or in the HPI.   Constitutional: Fatigue  Head: Nasal Congestion  Gastrointestinal: Bloating  Genitourinary: Cramps, Vaginal dryness  Neurologic: Headaches  Psychiatric: Anxiety  No urinary frequency or dysuria, bladder or kidney problems      OBJECTIVE:     /62   Ht 1.613 m (5' 3.5\")   Wt 73 kg (161 lb)   LMP 08/21/2022 (Exact Date)   Breastfeeding No   BMI 28.07 kg/m    Body mass index is 28.07 " kg/m .    Exam:  Constitutional:  Appearance: Well nourished, well developed alert, in no acute distress  Skin: General Inspection:  No rashes present, no lesions present, no areas of discoloration.  Neurologic:  Mental Status:  Oriented X3.  Normal strength and tone, sensory exam grossly normal, mentation intact and speech normal.    Psychiatric:  Mentation appears normal and affect normal/bright.  Pelvic Exam:  External Genitalia:     Infibulation present from clitoris to just above the urethral orifice  Vagina:     Normal vaginal vault without central or paravaginal defects, no discharge present, no inflammatory lesions present, no masses present  Bladder:     Nontender to palpation  Urethra:   Urethral Body:  Urethra palpation normal, urethra structural support normal   Urethral Meatus:  No erythema or lesions present  Cervix:     Appearance healthy, no lesions present, nontender to palpation, no bleeding present  Uterus:     Uterus: firm, normal sized and nontender, anteverted in position.   Adnexa:     Bilateral adnexal tenderness present, no adnexal masses present  Perineum:     Perineum within normal limits, no evidence of trauma, no rashes or skin lesions present  Anus:     external hemorrhoids present    Genitalia and Groin:     No rashes present, no lesions present, no areas of discoloration, no masses present         ASSESSMENT/PLAN:                                                        ICD-10-CM    1. Routine screening for STI (sexually transmitted infection)  Z11.3 HIV Antigen Antibody Combo     Treponema Abs w Reflex to RPR and Titer     Hepatitis C antibody     Hepatitis B surface antigen     Neisseria gonorrhoeae PCR     Chlamydia trachomatis PCR     Chlamydia trachomatis PCR     Neisseria gonorrhoeae PCR     HIV Antigen Antibody Combo     Treponema Abs w Reflex to RPR and Titer     Hepatitis C antibody     Hepatitis B surface antigen   2. Female genital infibulation  N90.813    3. Urticaria  L50.9  hydrOXYzine (ATARAX) 25 MG tablet   4. Abnormal uterine bleeding  N93.9    5. Screening for cervical cancer  Z12.4 Pap screen with HPV - recommended age 30 - 65 years   6. Primary insomnia  F51.01    7. Dyspareunia, female  N94.10    8. Pelvic pain in female  R10.2      We discussed surgical management of her infibulation and a hysterectomy for the heavy menses refractory to medical management. She is more bothered by her bleeding and would like to think about a hysterectomy further. She would like to start with the progesterone IUD as it worked before and will return for a Mirena IUD placement. I encouraged sending her records from [facility].   I looked up the requested sleep aid and since it is a controlled substance I recommend seeing her PCP for the medication.    Shelly Diaz MD  [facility] [place]

## 2022-09-01 NOTE — TELEPHONE ENCOUNTER
Returned patient's call updating with Dr Diaz's response--see previous note.  Patient verbalized understanding and is in agreement with plan.  Hawk Baez RN on 9/1/2022 at 4:25 PM

## 2022-09-02 ENCOUNTER — E-VISIT (OUTPATIENT)
Dept: PEDIATRICS | Facility: CLINIC | Age: 47
End: 2022-09-02
Payer: COMMERCIAL

## 2022-09-02 DIAGNOSIS — G47.09 OTHER INSOMNIA: Primary | ICD-10-CM

## 2022-09-02 LAB
C TRACH DNA SPEC QL NAA+PROBE: NEGATIVE
HBV SURFACE AG SERPL QL IA: NONREACTIVE
HCV AB SERPL QL IA: NONREACTIVE
HIV 1+2 AB+HIV1 P24 AG SERPL QL IA: NONREACTIVE
N GONORRHOEA DNA SPEC QL NAA+PROBE: NEGATIVE
T PALLIDUM AB SER QL: NONREACTIVE

## 2022-09-02 PROCEDURE — 99422 OL DIG E/M SVC 11-20 MIN: CPT | Performed by: INTERNAL MEDICINE

## 2022-09-02 RX ORDER — DARIDOREXANT 25 MG/1
25 TABLET, FILM COATED ORAL
Qty: 15 TABLET | Refills: 0 | Status: SHIPPED | OUTPATIENT
Start: 2022-09-02 | End: 2022-11-03

## 2022-09-06 LAB
BKR LAB AP GYN ADEQUACY: NORMAL
BKR LAB AP GYN INTERPRETATION: NORMAL
BKR LAB AP HPV REFLEX: NORMAL
BKR LAB AP LMP: NORMAL
BKR LAB AP PREVIOUS ABNORMAL: NORMAL
PATH REPORT.COMMENTS IMP SPEC: NORMAL
PATH REPORT.COMMENTS IMP SPEC: NORMAL
PATH REPORT.RELEVANT HX SPEC: NORMAL

## 2022-09-07 LAB
HUMAN PAPILLOMA VIRUS 16 DNA: NEGATIVE
HUMAN PAPILLOMA VIRUS 18 DNA: NEGATIVE
HUMAN PAPILLOMA VIRUS FINAL DIAGNOSIS: NORMAL
HUMAN PAPILLOMA VIRUS OTHER HR: NEGATIVE

## 2022-09-10 ENCOUNTER — HEALTH MAINTENANCE LETTER (OUTPATIENT)
Age: 47
End: 2022-09-10

## 2022-09-12 ENCOUNTER — TELEPHONE (OUTPATIENT)
Dept: PEDIATRICS | Facility: CLINIC | Age: 47
End: 2022-09-12

## 2022-09-14 NOTE — TELEPHONE ENCOUNTER
Prior Authorization Retail Medication Request    Medication/Dose: Daridorexant HCl (QUVIVIQ) 25 MG TABS  ICD code (if different than what is on RX): Other insomnia [G47.09]  - Primary   Previously Tried and Failed:  Rationale:    Insurance Name:  Insurance ID:      Pharmacy Information (if different than what is on RX)  Name: Sally  Phone:

## 2022-09-16 NOTE — TELEPHONE ENCOUNTER
Central Prior Authorization Team   Phone: [phone]      CALLED INSURANCE AND PA IS STILL PENDING BUT SHOULD HEAR BACK TODAY

## 2022-09-20 NOTE — TELEPHONE ENCOUNTER
Central Prior Authorization Team   Phone: [phone]      PRIOR AUTHORIZATION DENIED    Medication: Daridorexant HCl (QUVIVIQ) 25 MG TABS--denied    Denial Date: 9/20/2022    Denial Rational:        Appeal Information:

## 2022-09-23 NOTE — TELEPHONE ENCOUNTER
Central Prior Authorization Team   Phone: [phone]      Medication Appeal Initiation    We have initiated an appeal for the requested medication:  Medication: Daridorexant HCl (QUVIVIQ) 25 MG TABS--denied--APPEALING  Appeal Start Date:  9/23/2022  Insurance Company: [employer] - Phone [phone] Fax [phone]  Comments:  FAXED APPEAL LETTER

## 2022-09-27 NOTE — TELEPHONE ENCOUNTER
Diagnosis, Referred by & from: Hemorrhoid, looking for removal options   Appt date: 1/2/2023   NOTES STATUS DETAILS   OFFICE NOTE from referring provider N/A    OFFICE NOTE from other specialist Internal [facility] - [place]:  9/1/22 - OBGYN OV with Dr. Joe Church - [place]:  7/3/20 - PCC OV with Dr. Arredondo  4/12/18 - PCC OV with Dr. Isabella Church - [place]:  2/3/20 - PCC OV with Dr. Rocha   DISCHARGE SUMMARY from hospital N/A    DISCHARGE REPORT from the ER N/A    OPERATIVE REPORT [facility]:  3/16/18 - OP Note for Hysteroscopy, Dilation and Curettage, unable to perform Endometrial Ablation with Novasure secondary to uterine perforation, diagnostic laparoscopy, Bilateral Laparoscopic Tubal Ligation with Dr. White   MEDICATION LIST Internal    LABS N/A    DIAGNOSTIC PROCEDURES N/A    IMAGING (DISC & REPORT)      CT [facility]:  4/10/18 - CT Abd/Pelvis   ULTRASOUND  (ENDOANAL/ENDORECTAL) [facility]:  7/25/17 - US Pelvic  1/18/17 - US Pelvic

## 2022-09-28 NOTE — TELEPHONE ENCOUNTER
Central Prior Authorization Team   Phone: [phone]      PER INSURANCE IT IS STILL UNDER REVIEW

## 2022-09-29 NOTE — TELEPHONE ENCOUNTER
Central Prior Authorization Team   Phone: [phone]      MEDICATION APPEAL DENIED    Medication: Daridorexant HCl (QUVIVIQ) 25 MG TABS--denied--APPEALING--DENIED    Denial Date: 9/29/2022    Denial Rational:          Second Level Appeal Information:        Second level appeals will be managed by the clinic staff and provider. Please contact the upadth Prior Authorization Team if additional information about the denial is needed.

## 2022-09-30 ENCOUNTER — TELEPHONE (OUTPATIENT)
Dept: PEDIATRICS | Facility: CLINIC | Age: 47
End: 2022-09-30

## 2022-11-03 ENCOUNTER — E-VISIT (OUTPATIENT)
Dept: PEDIATRICS | Facility: CLINIC | Age: 47
End: 2022-11-03
Payer: COMMERCIAL

## 2022-11-03 DIAGNOSIS — G47.09 OTHER INSOMNIA: ICD-10-CM

## 2022-11-03 PROCEDURE — 99422 OL DIG E/M SVC 11-20 MIN: CPT | Performed by: INTERNAL MEDICINE

## 2022-11-03 RX ORDER — DARIDOREXANT 25 MG/1
25 TABLET, FILM COATED ORAL
Qty: 15 TABLET | Refills: 0 | Status: SHIPPED | OUTPATIENT
Start: 2022-11-03 | End: 2022-11-17

## 2022-11-08 ENCOUNTER — TELEPHONE (OUTPATIENT)
Dept: PEDIATRICS | Facility: CLINIC | Age: 47
End: 2022-11-08

## 2022-11-08 NOTE — TELEPHONE ENCOUNTER
I have no other ideas.  She'll have to make an appointment with me or with sleep specialist.  Does she want us to refer her?

## 2022-11-08 NOTE — TELEPHONE ENCOUNTER
General Call      Reason for Call:  Patient would like less expensive medicine    What are your questions or concerns:  Patient did not  Quviviq because it is too expensive. She said she would like something else prescribed that is not so much money.     Date of last appointment with provider: 11/3/22    Could we send this information to you in [place] or would you prefer to receive a phone call?:   Patient would prefer a phone call   Okay to leave a detailed message?: Yes at Home number on file [phone] (home)

## 2022-11-11 ENCOUNTER — OFFICE VISIT (OUTPATIENT)
Dept: PEDIATRICS | Facility: CLINIC | Age: 47
End: 2022-11-11
Payer: COMMERCIAL

## 2022-11-11 VITALS
SYSTOLIC BLOOD PRESSURE: 132 MMHG | BODY MASS INDEX: 28.51 KG/M2 | WEIGHT: 163.5 LBS | DIASTOLIC BLOOD PRESSURE: 64 MMHG | HEART RATE: 80 BPM

## 2022-11-11 DIAGNOSIS — F41.9 ANXIETY: Primary | ICD-10-CM

## 2022-11-11 PROCEDURE — 99214 OFFICE O/P EST MOD 30 MIN: CPT | Performed by: INTERNAL MEDICINE

## 2022-11-11 RX ORDER — DULOXETIN HYDROCHLORIDE 20 MG/1
20 CAPSULE, DELAYED RELEASE ORAL 2 TIMES DAILY
Qty: 30 CAPSULE | Refills: 1 | Status: SHIPPED | OUTPATIENT
Start: 2022-11-11 | End: 2022-11-17 | Stop reason: SINTOL

## 2022-11-11 NOTE — PATIENT INSTRUCTIONS
Let's begin cymbalta, 20 mg only.     Follow up by evisit or phone visit or in person in 1 month with an update.    In meantime, call your counselor again.    I've placed a referral to a psychiatrist to get their help in treatment.    Ongoing: deal with thoughts and responsibilties early evening.  Turn off screens, read books or listen to music to keep your thoughts on calming things.

## 2022-11-11 NOTE — PROGRESS NOTES
"  Assessment & Plan     Anxiety    Symptoms ongoing, with insomnia.  Multiple meds tried and failed.  Trial of cymbalta, with follow up at psych.    Patient Instructions   Let's begin cymbalta, 20 mg only.     Follow up by evisit or phone visit or in person in 1 month with an update.    In meantime, call your counselor again.    I've placed a referral to a psychiatrist to get their help in treatment.    Ongoing: deal with thoughts and responsibilties early evening.  Turn off screens, read books or listen to music to keep your thoughts on calming things.      - Adult Mental Health  Referral; Future  - DULoxetine (CYMBALTA) 20 MG capsule; Take 1 capsule (20 mg) by mouth 2 times daily             BMI:   Estimated body mass index is 28.51 kg/m  as calculated from the following:    Height as of 9/1/22: 1.613 m (5' 3.5\").    Weight as of this encounter: 74.2 kg (163 lb 8 oz).   Weight management plan: Patient was referred to their PCP to discuss a diet and exercise plan.    Patient Instructions   Let's begin cymbalta, 20 mg only.     Follow up by evisit or phone visit or in person in 1 month with an update.    In meantime, call your counselor again.    I've placed a referral to a psychiatrist to get their help in treatment.    Ongoing: deal with thoughts and responsibilties early evening.  Turn off screens, read books or listen to music to keep your thoughts on calming things.       Return in about 4 weeks (around 12/9/2022) for medicine followup, with me, using a MyChart eVisit.    Jose Daniel Arredondo MD  [facility] JOSE Duarte is a 47 year old, presenting for the following health issues:  No chief complaint on file.      HPI   Still struggles with sleep.  Gets anxiety during the evening.  THinks about day to day things, Tries to \"figure things out\" before bedtime.      Goes to bed at 11:30.  Falls asleep 2-3 AM.  Wakes up 7:30, and works at home.  No naps.      Drinks caffiene.    Has " tried some CBD at night, but caused sneezing allergies.  Has also tried: valerian root, melatonin, Benadryl (diphenhydramine), hydroxyzine, lavender pills, potassium.    Has tried lunesta and ambien, but both caused hives.      WHen tried Quviviq, it calmed her down.  Could get 3-4 hours of good sleep Too expensive.  15 pills is $260.     No snoring.  Sleep study: no obstructive sleep apnea.      IN past has done cognitive behavioral therapy in [place].  Journals.  Stopped with counseling in Dec.  Found it helpful overall.     In past has been on pristigue, fluoxetine, buspar, trazodone, mirtazapine, amitriptyline, wellbutrin.              Review of Systems   CONSTITUTIONAL: NEGATIVE for fever, chills, change in weight  INTEGUMENTARY/SKIN: NEGATIVE for worrisome rashes, moles or lesions  EYES: NEGATIVE for vision changes or irritation  ENT/MOUTH: NEGATIVE for ear, mouth and throat problems  RESP: NEGATIVE for significant cough or SOB  BREAST: NEGATIVE for masses, tenderness or discharge  CV: NEGATIVE for chest pain, palpitations or peripheral edema  GI: NEGATIVE for nausea, abdominal pain, heartburn, or change in bowel habits  : NEGATIVE for frequency, dysuria, or hematuria  MUSCULOSKELETAL: NEGATIVE for significant arthralgias or myalgia  NEURO: NEGATIVE for weakness, dizziness or paresthesias  ENDOCRINE: NEGATIVE for temperature intolerance, skin/hair changes  HEME: NEGATIVE for bleeding problems  PSYCHIATRIC: NEGATIVE for changes in mood or affect      Objective    There were no vitals taken for this visit.  There is no height or weight on file to calculate BMI.  Physical Exam   GENERAL: healthy, alert and no distress  EYES: Eyes grossly normal to inspection, PERRL and conjunctivae and sclerae normal  HENT: ear canals and TM's normal, nose and mouth without ulcers or lesions  NECK: no adenopathy, no asymmetry, masses, or scars and thyroid normal to palpation  RESP: lungs clear to auscultation - no rales, rhonchi  or wheezes  BREAST: normal without masses, tenderness or nipple discharge and no palpable axillary masses or adenopathy  CV: regular rate and rhythm, normal S1 S2, no S3 or S4, no murmur, click or rub, no peripheral edema and peripheral pulses strong  ABDOMEN: soft, nontender, no hepatosplenomegaly, no masses and bowel sounds normal  MS: no gross musculoskeletal defects noted, no edema  SKIN: no suspicious lesions or rashes  NEURO: Normal strength and tone, mentation intact and speech normal  PSYCH: mentation appears normal, affect normal/bright

## 2022-11-17 ENCOUNTER — TELEPHONE (OUTPATIENT)
Dept: BEHAVIORAL HEALTH | Facility: CLINIC | Age: 47
End: 2022-11-17

## 2022-11-17 ENCOUNTER — VIRTUAL VISIT (OUTPATIENT)
Dept: PSYCHIATRY | Facility: CLINIC | Age: 47
End: 2022-11-17
Attending: INTERNAL MEDICINE
Payer: COMMERCIAL

## 2022-11-17 DIAGNOSIS — F41.9 ANXIETY: ICD-10-CM

## 2022-11-17 DIAGNOSIS — F51.04 PSYCHOPHYSIOLOGICAL INSOMNIA: Primary | ICD-10-CM

## 2022-11-17 PROCEDURE — 99205 OFFICE O/P NEW HI 60 MIN: CPT | Mod: 95 | Performed by: NURSE PRACTITIONER

## 2022-11-17 RX ORDER — DOXEPIN 3 MG/1
3-6 TABLET, FILM COATED ORAL AT BEDTIME
Qty: 30 TABLET | Refills: 0 | Status: SHIPPED | OUTPATIENT
Start: 2022-11-17 | End: 2022-11-25 | Stop reason: DRUGHIGH

## 2022-11-17 NOTE — PATIENT INSTRUCTIONS
**For crisis resources, please see the information at the end of this document**     Thank you for coming to the [facility] [place] [facility].    TREATMENT PLAN:  Medications:   DISCONTINUE duloxetine 20 mg at bedtime. (Patient already discontinued after 1 use).  START doxepin 3 mg at bedtime. May increase to 6 mg if tolerated. Take separately of food (at least 3 hours).  Continue all other medications per primary care provider.     Consults / Referrals:   - Recommend therapy. To schedule individual or family therapy, call [facility] at [phone]. Consider past provider.    Psychoeducation:  - SLEEP HYGIENE: establish a sleep routine, limit screen time 1 hour prior to bed, use bed for sleep only, take sleep/medications on time (including sleepy time tea, trazadone or herbal treatments such as melatonin), aroma therapy, limit caffeine/sugar, yoga, guided imagery, stretch, meditation, limit naps to 20 minutes, make a temperature change in the room, white noise, be mindful of slowing down breathing, take a warm bath/shower, frequently wash sheets, and journaling.       MEDICATION REFILLS:  If you need any refills please call your pharmacy and they will contact us. Our fax number for refills is [phone]. Please allow three business for refill processing. If you need to  your refill at a new pharmacy, please contact the new pharmacy directly. The new pharmacy will help you get your medications transferred.       Financial Assistance [phone]  [facility] Billing [phone]  Central Billing Office, [facility]: [phone]  [place] [facility] [phone]  Medical Records [phone]  [place] Patient Bill of Rights [email or web address]       MENTAL HEALTH CRISIS RESOURCES:  For a emergency help, please call 911 or go to the nearest Emergency Department.     Emergency Walk-In Options:   EmPATH Unit @  [facility] ([place]): [phone] - Specialized mental health emergency area designed to be calming  [facility] ([place]): [phone]  [facility] ([place]): [phone]  [facility] ([place]): [phone]    [facility] Crisis Information:   [place]: [phone]  [place]: [phone]  [place] ([place]) - Adult: [phone]     Child: [phone]  [place] - Adult: [phone]     Child: [phone]  [place]: [phone]  List of all [facility] resources:   https://[place].gov/dhs/people-we-serve/adults/health-care/mental-health/resources/crisis-contacts.jsp    National Crisis Information:   National Suicide & Crisis Lifeline: Call 598        For online chat options, visit https://suicidepreventionlifeline.org/chat/  Poison Control Center: 5-766-520-6003  Poison Control Center: 7-888-357-8530  Trans Lifeline: 0-901-157-5573 - Hotline for transgender people of all ages  The [employer]: 6-115-039-4037 - Hotline for LGBT youth     For Non-Emergency Support:   Fast Tracker: Mental Health & Substance Use Disorder Resources -   https://www.fasttrackermn.org/       Again thank you for choosing [facility] [place] [facility] and please let us know how we can best partner with you to improve you and your family's health.    You may be receiving a survey regarding this appointment. We would love to have your feedback, both positive and negative. The survey is done by an external company, so your answers are anonymous.

## 2022-11-17 NOTE — Clinical Note
Please call to schedule patient for follow-up in 2 weeks.  Thank you!   LANE Purdy, ELLIOT-BC [facility] ([facility]) [facility]

## 2022-11-17 NOTE — PROGRESS NOTES
"Felicia is a 47 year old who is being evaluated via a billable video visit.      How would you like to obtain your AVS? MyChart  If the video visit is dropped, the invitation should be resent by: Text to cell phone: [phone]  Will anyone else be joining your video visit? Mehnaz Martinez         PSYCHIATRIC  DIAGNOSTIC  EVALUATION                                                                    Name:  Felicia Bermudez  : 1975     Telemedicine Visit: The patient's condition can be safely assessed and treated via synchronous audio and visual telemedicine encounter.      Reason for Telemedicine Visit: COVID 19 pandemic and the social and physical recommendations by the CDC and [facility].      Originating Site (Patient Location): Patient's home     Distant Site (Provider Location): Provider Remote Setting     Consent:  The patient/guardian has verbally consented to: the potential risks and benefits of telemedicine (video visit or phone) versus in person care; bill my insurance or make self-payment for services provided; and responsibility for payment of non-covered services.     Mode of Communication:  D[employer] video platform      As the provider I attest to compliance with applicable laws and regulations related to telemedicine.    Source of Referral:  Primary Care Provider: Jose Daniel Arredondo MD   Current Psychotherapist: none     PCP Clinical Question for referral: \"ongoing anxiety and sleep issues; multiple failed regimens.  Genesight testing done.\"    The [facility] psychiatry providers act as a specialty service for Primary Care Providers in the [facility] who seek to optimize medications for unstable patients. Once medications have been optimized, [facility] providers discharge the patient back to the referring Primary Care Provider for ongoing medication management. This type of system allows [facility] to serve a high volume of patients.     Identifying Data:  Patient is a 47 year old,  Black or  " "American Not  or  female  who presents for initial visit with me.    Patient prefers to be called: \"Felicia\".  Patient is currently employed full time.     HPI  Patient presents for initial psychiatric evaluation with the [facility] ([facility]) for evaluation of anxiety and insomnia.      RECORDS AVAILABLE FOR REVIEW: EHR records through [employer]  and [employer] testing scans from 2019.       Chief Complaint:  \"Sleep problem/ trouble relaxing and anxiousness\"    Patient is 47F with history of chronic insomnia and more recent anxiety, referred by PCP for possible medication management given history of multiple medication trials that are typically ineffective, but most frequently discontinued due to significant side effects. Reports sleep issues started > 20 years ago after son was born. Anxiety is newer in last few years without obvious trigger. Reports moderate anxiety symptoms during the day, but become significant at bedtime when she tries for sleep: \"Can't calm or shut down my brain. Reviewing everything from the day.\" Onset can take ~3 hours, getting ~3 hours sleep, waking unrested. Denies any daytime sleeping. Caffeine: only 1-2 cups before 12PM.  Past sleep study that was negative per patient / chart and r/o ANGELA, and patient completed brief CBT-Insomnia appointments (?2) that were helpful but not significant enough change and has not continued. Med trials with PCP initially focused on sleep or benzodiazepines but had allergic reactions. Recent Quiviviq was helpful for 3-4 hours restful sleep but too expensive ($200 / 15 pills). Other past trials:  - Last week duloxetine 20 m day and was sick for the entire weekend, 3 days. \"couldn't eat, drink, stomach hurting. N/V first day and tense, shaking and just went away yesterday.\" Took at bedtime and felt very awake, energized, \"agitated.\"   - Pristiq: really bad HA, dizziness, stomach and could not sleep - wide awake. Took for 1 " "month.   - Fluoxetine: made me more agitated, tried at bedtime - moved to AM but could not focus, couldn't sit still.  - Mirtazapine: kept me up, but during the day made very anxious.   - Trazodone: agitated, tense and no help.   - Amitryptyline: \"made me drowsy at first but then up and nothing happened.\" then sleepy during the day.   - Gabapentin: \"felt like my bones were hurting\", severe muscle tension and pain.   - Quetiapine: \"triggered my migraines\" and felt stuffed up head cold. < week.   - Additionally has hydroxyzine she uses for hives, and does feel drowsy but does not sleep well (usually 3-4 hours) and the next day dry mouth, HA, shakiness, forgetful.      Patient completed Animated Speech testing in 2019 that did show several significant gene-drug interactions that may explain SE.    Denies significant depression, but fatigue can lead to low mood at times. Does endorse forgetfulness, difficulty concentrating, and past provider tried stimulant (did not dx: ADHD) during day but lead to significant agitation for pt without benefit to sleep. Denies SI/SIB/HI. No history of yasmine, psychosis, substance use.       Current Medications:    Current Outpatient Medications:      Daridorexant HCl (QUVIVIQ) 25 MG TABS, Take 25 mg by mouth nightly as needed (insomnia), Disp: 15 tablet, Rfl: 0     DULoxetine (CYMBALTA) 20 MG capsule, Take 1 capsule (20 mg) by mouth 2 times daily, Disp: 30 capsule, Rfl: 1     hydrOXYzine (ATARAX) 25 MG tablet, Take 1 tablet (25 mg) by mouth every 4 hours as needed for itching For hives, Disp: 30 tablet, Rfl: 11     ibuprofen (ADVIL/MOTRIN) 200 MG capsule, , Disp: , Rfl:      Iron-Vitamin C 100-250 MG TABS, , Disp: , Rfl:      Multiple Vitamins-Minerals (MULTIVITAMIN ADULT, MINERALS, PO), , Disp: , Rfl:      norethindrone-ethinyl estradiol (JUNEL FE 1/20) 1-20 MG-MCG tablet, Take 1 tablet by mouth daily, Disp: 84 tablet, Rfl: 3     Vitamin D2, Ergocalciferol, 50 MCG (2000 UT) CAPS, , Disp: , " "Rfl:     Medication side effects: Yes: hydroxyzine: HA, fogginess, dry mouth the next day    The [place] Prescription Monitoring Program has been reviewed and there are no concerns about diversionary activity for controlled substances at this time.   10/07/2022  1   09/02/2022  Quviviq 25 MG Tablet  15.00  15 Da Yeh   [id number]   Cedric (8196)   0/0  0.50 LME  Comm Ins   [place]         Psychiatric Review of Symptoms:  Depression:  sleep changes (insomnia or hypersomnia), fatigue of loss of energy and difficulty concentrating or indecisiveness Self rates as 2 / 10, where 0 is none at all and 10 being severe depression.  SI/SIB/HI:  Denies  Anxiety:  restlessness / feeling keyed up,  easily fatigued,  difficulty concentrating or mind going blank, muscle tension and sleep disturbances (difficulty falling / staying asleep, or restless / unsatisfying) Can be associated with shaking. Feels \"can't relax, wanting to get up can't sit.\" Can increase in performance situations (\"heart really fast, breath up\").  Self rates as with Cymbalta 10, normally 5-6 / 10, where 0 is none at all and 10 being severe anxiety.  Energy: typically low during day if poor sleep.  Appetite or weight changes: denies  Irritability / mood swings: rare  Opal / impulsivity / racing thoughts / speech: denies  Panic (description): denies  OCD: denies  Skin picking / hair pulling: denies  Psychosis/AH/VH/delusions: denies  Paranoia: denies  Eating DO: denies  Trauma sx: (not evaluated at this time)      PHQ-9 scores:   PHQ-9 SCORE 6/4/2020 7/3/2020 9/1/2022   PHQ-9 Total Score 17 3 0       MALLORIE-7 scores:    MALLORIE-7 SCORE 6/4/2020 7/3/2020 9/1/2022   Total Score 15 2 4       Medical Review of Systems:  10 systems (general, cardiovascular, respiratory, eyes, ENT, endocrine, GI, , M/S, neurological) were reviewed. Most pertinent finding(s) is/are: none. The remaining systems are all unremarkable    Vitals:   There were no vitals taken for this visit.    Pulse " Readings from Last 5 Encounters:   11/11/22 80   02/22/22 67   02/03/20 61   04/29/19 72   04/25/19 80     Wt Readings from Last 5 Encounters:   11/11/22 74.2 kg (163 lb 8 oz)   09/01/22 73 kg (161 lb)   06/01/20 71.7 kg (158 lb)   05/18/20 71.7 kg (158 lb)   05/04/20 72.6 kg (160 lb)     BP Readings from Last 5 Encounters:   11/11/22 132/64   09/01/22 106/62   02/22/22 123/78   02/03/20 124/82   04/29/19 127/77       Psychiatric History:   Hospitalizations: None  History of Commitment? No   Past Treatment: counseling and medication(s) from physician / PCP  History of Suicidal Ideation: denies  Suicide Attempts: No   History of Violence/Aggression: No   Self-injurious Behavior: Denies  Electroconvulsive Therapy (ECT) or Transcranial Magnetic Stimulation (TMS): No   GeneSight Genetic Testing: No     Past psychotropic medication trials include but are not limited to:   (see HPI)    Substance Use History:  Current Use of Drugs/Alcohol: Denies   Past Use of Drugs/Alcohol: Denies  Patient reports no problems as a result of their drinking / drug use.   Patient has not received chemical dependency treatment in the past  Recovery Programming Involvement: Not Applicable    Tobacco use: No  Caffeine:  Yes  1-2 drinks before 12 PM.    Based on the clinical interview, there  are not indications of drug or alcohol abuse. Continue to monitor.   Discussed effect of substance use on overall health.   CAGE-AID Score today was: 0     Family History:   Patient reported family history includes:   Family History   Problem Relation Age of Onset     Neurologic Disorder Mother 57        brain anuerysm     Hypertension Father      Other Cancer Father 70        Testicle Cancer     Diabetes Father      Diabetes Paternal Grandmother      Other - See Comments Daughter 21        rare blood disorders     Polycythemia Daughter       Mental Illness History: denies known history  Substance Abuse History: Denies  Suicide History: Denies  Medications  that helped: Denies     Social History:   Birth place: [place]  Childhood: Yes intact home  Siblings: 6 siblings. 2 brothers here ([place], [place]), the rest of family in [place]  Highest education level was some college.   Employment Status: full time - community health worker.  Relationship status: single  Current Living situation: just her and son.  Feels safe at home.  Children: two: son and daughter (23, 27 y/o)  Firearms/Weapons Access:  Yes No access (son's gun)    Service: No  Support: friends    Legal History:  No: Patient denies any legal history    Significant Losses / Trauma / Abuse / Neglect Issues:  There are no indications or report of: significant losses, trauma, abuse or neglect - not assessed at this time, further eval needed.   Issues of possible neglect are not present.     Past Medical History:  Reviewed per Electronic Medical Record Today    Past Medical History:   Diagnosis Date     Abnormal weight gain 2/28/2017     Adenitis 4/30/2019     Anxiety 6/9/2018     Arthritis      Dyspepsia 3/18/2019     High risk HPV infection 08/03/15    NIL, +HPV 18, plan colp     History of colposcopy 11/16/15    No bx taken     Hives     multiple allergies - food, environmental     Insomnia      Migraine without status migrainosus, not intractable 4/29/2019      Surgery:   Past Surgical History:   Procedure Laterality Date     DILATION AND CURETTAGE, HYSTEROSCOPY, ABLATE ENDOMETRIUM NOVASURE, COMBINED N/A 3/16/2018    Procedure: COMBINED DILATION AND CURETTAGE, HYSTEROSCOPY, ABLATE ENDOMETRIUM NOVASURE;  Hysteroscopy, Dilation and Curettage, unable to perform Endometrial Ablation with Novasure secondary to uterine perforation, diagnostic laparoscopy, Bilateral Laparoscopic Tubal Ligation ;  Surgeon: Danilo White MD;  Location: [facility] OR     EYE SURGERY      REmoval of mass left eye     LAPAROSCOPIC TUBAL LIGATION Bilateral 3/16/2018    Procedure: LAPAROSCOPIC TUBAL LIGATION;;  Surgeon: Danilo White  MD KASHIF;  Location: [facility] OR     LAPAROSCOPY DIAGNOSTIC (GYN)       SURGICAL HISTORY OF -       2010 turbinate surgery     Food and Medicine Allergies:     Allergies   Allergen Reactions     Bactrim [Sulfamethoxazole W/Trimethoprim]      Tongue swelling     Ambien      Tongue swelling     Amoxicillin      Tongue swelling     Avocado Other (See Comments) and Hives     Selling of mouth.     Cucumber Extract Other (See Comments)     Tongue swelling     Doxycycline Hives and Itching     Iodine Hives     Latex Hives     Lip swelling     Sulfa Drugs Hives     Watermelon [Citrullus Vulgaris] Hives     itching     Zolpidem Unknown     Augmentin Rash     Tongue swelling     Diagnostic X-Ray Materials Rash     Eszopiclone Swelling and Rash     Tongue swelling     Seizures or Head Injury: reports as a baby may have had head injury  Diet: No Restrictions  Exercise: work out 3-4 times, walk 5 miles when warmer and stretch when home or video on youtube. Does help with sleep in done in AM.  Supplements: vitamin D, magnesium rarely  Pregnant: denies    Mental Status Exam:  Alertness: alert  and oriented   Appearance: adequately groomed  Behavior/Demeanor: cooperative, pleasant and calm, with good eye contact   Speech: normal and regular rate and rhythm  Language: intact and no problems  Psychomotor: normal or unremarkable  Mood: description consistent with euthymia  Affect: full range and appropriate; was congruent to mood; was congruent to content  Thought Process/Associations: unremarkable  Thought Content:  Reports none;  Denies suicidal ideation, violent ideation and delusions  Perception:  Reports none;  Denies auditory hallucinations and visual hallucinations  Insight: intact  Judgment: intact  Cognition: does  appear grossly intact; formal cognitive testing was not done  Recent and Remote Memory: Intact to interview. Not formally assessed. No amnesia.  Attention Span and Concentration: normal  Fund of Knowledge:   "appropriate  Gait and Station: unremarkable    Strengths and Opportunities:   Felicia Bermudez identified the following strengths or resources that may help she succeed in counseling: exercise routine, friends / good social support, family support and positive work environment.     Things that may interfere with the patient's success include:  none noted at this time.    Protective Factors: future oriented, social support, motivation and readiness for change and displaying help seeking behavior    Static Risk Factors: history of MH diagnoses and/or treatment    Dynamic Risk Factors: insomnia and anxiety    There are no language or communication issues or need for modification in treatment.   There are ethnic, cultural or Buddhism factors that may be relevant for therapy: \"Roman Catholic\"  Client identified their preferred language to be English.  Client does not  need the assistance of an  or other support involved in therapy.    Suicide Risk Assessment:  Based on review of above risk and protective factors and today's exam, pt is not at elevated risk of harm to self or others. She does not meet criteria for a 72 hr hold and remains appropriate for ongoing outpatient care. The patient convincingly denies suicidality today. There was no deceit detected, and the patient presented in a manner that was believable. Local community safety resources reviewed and sent to patient to use if needed.    Recommended that patient call 911 or go to the local ED should there be a change in any of these risk factors.    DSM5  Diagnosis:  300.00 (F41.9) Unspecified Anxiety Disorder  780.52 (G47.00) Insomnia Disorder   With non-sleep disorder mental comorbidity  Persistent      Medical Comorbidities Include:   Patient Active Problem List    Diagnosis Date Noted     Abnormal menstrual cycle 09/01/2022     Priority: Medium     Abnormal uterine bleeding 09/01/2022     Priority: Medium     Abnormal vaginal bleeding 09/01/2022     " Priority: Medium     Chronic fatigue syndrome 09/01/2022     Priority: Medium     Menopausal and postmenopausal disorder 09/01/2022     Priority: Medium     Difficulty sleeping 09/01/2022     Priority: Medium     Iron deficiency anemia 09/01/2022     Priority: Medium     Metrorrhagia 09/01/2022     Priority: Medium     Adenitis 04/30/2019     Priority: Medium     Migraine without status migrainosus, not intractable 04/29/2019     Priority: Medium     Vitamin D deficiency 09/20/2018     Priority: Medium     Anxiety 06/09/2018     Priority: Medium     Menorrhagia 09/12/2017     Priority: Medium     Insomnia, unspecified type 04/13/2017     Priority: Medium     Abnormal weight gain 02/28/2017     Priority: Medium     Abnormal Pap smear of cervix 08/03/2015     Priority: Medium              High risk HPV infection 08/03/2015     Priority: Medium     Previous HPV per patient on PAP in 2013 - was told to have yearly PAPs by Juhi MOON  08/03/15 NIL, +HPV 18, plan colp  11/16/15 [place] Done. No bx taken. Dx pap NIL with Neg HPV. Plan: cotest in 1 year  01/13/17 Dx pap= NIL, Neg HPV. 3 yr co-test per [facility]  9/1/22 NIL pap, neg HR HPV. Plan 3 year cotest       Urticaria 09/16/2014     Priority: Medium     Problem list name updated by automated process. Provider to review       Multiple food allergies 09/16/2014     Priority: Medium     PUD (peptic ulcer disease) 09/16/2014     Priority: Medium       DIFFERENTIAL DIAGNOSIS: R/O insomnia caused by other medical condition     Medical comorbidities impacting or contributing to clinical picture: Menopausal and postmenopausal disorder   Known issue that I take into account for their medical decisions, no current exacerbations or new concerns.    Impression:  Felicia Bermudez is a 47 year old Black or , female who presents for initial visit with [facility] Service ([facility]) for medication management. She presents today with CC: insomnia that is often  associated with anxiety, and at times can lead to low mood. She has extensive history of treatment of insomnia, including sleep study that was negative, brief CBT-I, and extensive medication trials that are most often discontinued for intolerable side effects with limited benefit to insomnia. Discussed limited remaining medications, particularly in context of her GeneSight testing, but she is open to trial of Silenor 3 mg (may increase to 6 mg if tolerating). Additionally discussed possibility of clonidine or guanfacine off label, or recommendation to return to CBT for insomnia. Patient is agreeable to plan. Follow-up in 2 weeks.     Medication side effects and alternatives reviewed. Health promotion activities recommended and reviewed today. All questions addressed. Education and counseling completed regarding risks and benefits of medications and psychotherapy options. Recommend therapy for additional support.     Treatment Plan:    DISCONTINUE duloxetine 20 mg at bedtime. (Patient already discontinued after 1 use).    START doxepin 3 mg at bedtime. May increase to 6 mg if tolerated. Take separately of food (at least 3 hours).    Continue all other medications per primary care provider.     Consider reestablishing therapy with CBT-Insomnia provider.    Safety plan reviewed. To the Emergency Department as needed or call after hours crisis line at [phone] or [phone]. [place] Crisis Text Line: Text [place] to 489004 or  Suicide LifeLine Chat: suicidepreventionlifeline.org/chat    Schedule an appointment with me in 2 weeks or sooner as needed.  Call [facility] at [phone] to schedule.    Follow up with primary care provider as planned or sooner if needed for acute medical concerns.    Call the psychiatric nurse line with medication questions or concerns at [phone].    MyChart may be used to communicate with your provider, but this is not intended to be used for emergencies.    Patient  Education:  Medication side effects and alternatives reviewed. Health promotion activities recommended and reviewed today. All questions addressed. Education and counseling completed regarding risks and benefits of medications and psychotherapy options.  Consent provided by patient/guardian  Call the psychiatric nurse line with medication questions or concerns at [phone].  MyChart may be used to communicate with your provider, but this is not intended to be used for emergencies.  SLEEP HYGIENE: establish a sleep routine, limit screen time 1 hour prior to bed, use bed for sleep only, take sleep/medications on time (including sleepy time tea, trazadone or herbal treatments such as melatonin), aroma therapy, limit caffeine/sugar, yoga, guided imagery, stretch, meditation, limit naps to 20 minutes, make a temperature change in the room, white noise, be mindful of slowing down breathing, take a warm bath/shower, frequently wash sheets, and journaling.   Medlineplus.gov is information for patients.  It is run by the [employer] of Medicine and it contains information about all disorders, diseases and all medications.      Community Resources:    National Suicide Prevention Lifeline: 225.871.3804 (TTY: 480.713.8062). Call anytime for help.  (www.suicidepreventionlifeline.org)  National [place] on Mental Illness (www.blue.org): 143.481.9684 or 326-776-0502.   Mental Health Association (www.mentalhealth.org): 379.582.6924 or 212-958-5844.  [place] Crisis Text Line: Text [place] to 820985  Suicide LifeLine Chat: suicidepreDipexium Pharmaceuticalsline.org/chat    Administrative Billin minutes spent on the date of the encounter doing chart review and reviewing referral documents, history and exam of patient, documentation and further activities as noted above.    Video/Phone Start Time: 1:40 PM  Video/Phone End Time: 2:35 PM      Patient Status:  [facility] MD/DO/NP/PA providers offer care a specialty service for Primary Care  Providers in the [facility] that seek to optimize psychotropic medications for unstable patients.  Once medications have been optimized, our providers discharge the patient back to the referring Primary Care Provider for ongoing medication management.  This type of system allows our providers to serve a high volume of patients.   Patient will continue to be seen for ongoing consultation and stabilization.    Signed:   Amie Vega, MSN, APRN, PMHNP-BC  Coll[facility]ative Care Psychiatry Service ([facility])  [facility]    Chart documentation done in part with Dragon Voice Recognition software.  Although reviewed after completion, some word and grammatical errors may remain.

## 2022-11-25 ENCOUNTER — MYC MEDICAL ADVICE (OUTPATIENT)
Dept: PSYCHIATRY | Facility: CLINIC | Age: 47
End: 2022-11-25

## 2022-11-25 DIAGNOSIS — F51.04 PSYCHOPHYSIOLOGICAL INSOMNIA: Primary | ICD-10-CM

## 2022-11-25 RX ORDER — DOXEPIN HYDROCHLORIDE 10 MG/1
10 CAPSULE ORAL AT BEDTIME
Qty: 30 CAPSULE | Refills: 0 | Status: SHIPPED | OUTPATIENT
Start: 2022-11-25 | End: 2022-12-26

## 2022-12-02 ENCOUNTER — VIRTUAL VISIT (OUTPATIENT)
Dept: PSYCHIATRY | Facility: CLINIC | Age: 47
End: 2022-12-02
Payer: COMMERCIAL

## 2022-12-02 DIAGNOSIS — F51.04 PSYCHOPHYSIOLOGICAL INSOMNIA: Primary | ICD-10-CM

## 2022-12-02 DIAGNOSIS — F41.9 ANXIETY: ICD-10-CM

## 2022-12-02 PROCEDURE — 99214 OFFICE O/P EST MOD 30 MIN: CPT | Mod: 95 | Performed by: NURSE PRACTITIONER

## 2022-12-02 ASSESSMENT — ANXIETY QUESTIONNAIRES
IF YOU CHECKED OFF ANY PROBLEMS ON THIS QUESTIONNAIRE, HOW DIFFICULT HAVE THESE PROBLEMS MADE IT FOR YOU TO DO YOUR WORK, TAKE CARE OF THINGS AT HOME, OR GET ALONG WITH OTHER PEOPLE: SOMEWHAT DIFFICULT
5. BEING SO RESTLESS THAT IT IS HARD TO SIT STILL: NOT AT ALL
2. NOT BEING ABLE TO STOP OR CONTROL WORRYING: NOT AT ALL
7. FEELING AFRAID AS IF SOMETHING AWFUL MIGHT HAPPEN: NOT AT ALL
GAD7 TOTAL SCORE: 1
8. IF YOU CHECKED OFF ANY PROBLEMS, HOW DIFFICULT HAVE THESE MADE IT FOR YOU TO DO YOUR WORK, TAKE CARE OF THINGS AT HOME, OR GET ALONG WITH OTHER PEOPLE?: SOMEWHAT DIFFICULT
1. FEELING NERVOUS, ANXIOUS, OR ON EDGE: NOT AT ALL
4. TROUBLE RELAXING: SEVERAL DAYS
6. BECOMING EASILY ANNOYED OR IRRITABLE: NOT AT ALL
GAD7 TOTAL SCORE: 1
7. FEELING AFRAID AS IF SOMETHING AWFUL MIGHT HAPPEN: NOT AT ALL
GAD7 TOTAL SCORE: 1
3. WORRYING TOO MUCH ABOUT DIFFERENT THINGS: NOT AT ALL

## 2022-12-02 ASSESSMENT — PATIENT HEALTH QUESTIONNAIRE - PHQ9
SUM OF ALL RESPONSES TO PHQ QUESTIONS 1-9: 3
SUM OF ALL RESPONSES TO PHQ QUESTIONS 1-9: 3
10. IF YOU CHECKED OFF ANY PROBLEMS, HOW DIFFICULT HAVE THESE PROBLEMS MADE IT FOR YOU TO DO YOUR WORK, TAKE CARE OF THINGS AT HOME, OR GET ALONG WITH OTHER PEOPLE: SOMEWHAT DIFFICULT

## 2022-12-02 NOTE — PATIENT INSTRUCTIONS
**For crisis resources, please see the information at the end of this document**     Thank you for coming to the [facility].    TREATMENT PLAN:  Medications:   CONTINUE doxepin 10 mg at bedtime. May try 20 mg (2x 10 mg tablet) and monitor for AM grogginess. No script needed.  Continue all other medications per primary care provider.   Follow Up:  Schedule an appointment with me in 3 weeks or sooner as needed. Call [facility] at [phone] to schedule.  Follow up with primary care provider as planned or for acute medical concerns.  Call the psychiatric nurse line with medication questions or concerns at [phone].  Piccsyhart may be used to communicate with your provider, but this is not intended to be used for emergencies.      MEDICATION REFILLS:  If you need any refills please call your pharmacy and they will contact us. Our fax number for refills is [phone]. Please allow three business for refill processing. If you need to  your refill at a new pharmacy, please contact the new pharmacy directly. The new pharmacy will help you get your medications transferred.       Financial Assistance [phone]  [facility] Billing [phone]  Central Billing Office, [facility]: [phone]  [facility] Billing [phone]  Medical Records [phone]  [facility] Patient Bill of Rights [email or web address]       MENTAL HEALTH CRISIS RESOURCES:  For a emergency help, please call 911 or go to the nearest Emergency Department.     Emergency Walk-In Options:   EmPATH Unit @ [facility] ([place]): [phone] - Specialized mental health emergency area designed to be calming  [facility] ([place]): [phone]  [facility] ([place]): [phone]  [facility]): [phone]    [facility] Crisis Information:   [place]:  [phone]  [place]: [phone]  [place] ([place]) - Adult: [phone]     Child: [phone]  [place] - Adult: [phone]     Child: [phone]  [place]: [phone]  List of all [facility] resources:   https://[place].gov/dhs/people-we-serve/adults/health-care/mental-health/resources/crisis-contacts.jsp    National Crisis Information:   National Suicide & Crisis Lifeline: Call 988        For online chat options, visit https://suicidepreventionlifeline.org/chat/  Poison Control Center: 9-761-869-6868  Poison Control Center: 7-498-534-0922  Trans Lifeline: 6-651-940-2862 - Hotline for transgender people of all ages  The [employer] Project: 1-558-714-6335 - Hotline for LGBT youth     For Non-Emergency Support:   Fast Tracker: Mental Health & Substance Use Disorder Resources -   https://www.fasttrackermn.org/       Again thank you for choosing [facility] [place] [facility] and please let us know how we can best partner with you to improve you and your family's health.    You may be receiving a survey regarding this appointment. We would love to have your feedback, both positive and negative. The survey is done by an external company, so your answers are anonymous.

## 2022-12-02 NOTE — Clinical Note
Please call to schedule patient for follow-up in 3 weeks.  Thank you!   LANE Purdy, ELLIOT-BC Collaborative Care Psychiatry Service (CCPS) [facility]

## 2022-12-02 NOTE — PROGRESS NOTES
"Felicia is a 47 year old who is being evaluated via a billable video visit.      How would you like to obtain your AVS? MyChart  If the video visit is dropped, the invitation should be resent by: Send to e-mail at: [email or web address]  Will anyone else be joining your video visit? No           PSYCHIATRIC MEDICATION FOLLOW UP APPT     Name: Felicia Bermudez   : 1975               Telemedicine Visit: The patient's condition can be safely assessed and treated via synchronous audio and visual telemedicine encounter.      Reason for Telemedicine Visit: COVID 19 pandemic and the social and physical recommendations by the CDC and [facility].      Originating Site (Patient Location): Patient's home     Distant Site (Provider Location): Provider Remote Setting     Consent:  The patient/guardian has verbally consented to: the potential risks and benefits of telemedicine (video visit or phone) versus in person care; bill my insurance or make self-payment for services provided; and responsibility for payment of non-covered services.     Mode of Communication:  Meridian-IQ video platform      As the provider I attest to compliance with applicable laws and regulations related to telemedicine.         Source of Referral / Care Team:  Primary Care Provider: Jose Daniel Arredondo MD   Therapist: none     PCP Clinical Question for referral: \"ongoing anxiety and sleep issues; multiple failed regimens.  Genesight testing done.\"    The [facility] psychiatry providers act as a specialty service for Primary Care Providers in the [facility] who seek to optimize medications for unstable patients. Once medications have been optimized, [facility] providers discharge the patient back to the referring Primary Care Provider for ongoing medication management. This type of system allows [facility] to serve a high volume of patients.     Patient Identification:  Patient is a 47 year old,  Black or  Not  or  female  who presents " "for initial visit with me.    Patient prefers to be called: \"Felicia\".  Patient is currently employed full time.     Patient attended the session alone.    RECORDS AVAILABLE FOR REVIEW: EHR records through [facility] .       Interim History:  I last saw Felicia Bermudez for outpatient psychiatry Consultation 2 weeks ago on 11/17/22. During that appointment, we initiated doxepin 3 mg for insomnia, as hoping to avoid significant SE patient has had with antidepressants in the past. However, insurance denied, and sent doxepin 10 mg 1 week ago. Today, patient reports ADHERING to prescribed medications over last 6 nights. She reports initial GI upset first day but taking w/ food has improved (still +constipation), as well as AM grogginess that lasts throughout the following day. She reports possible improvement in sleep 2-4 hours after taking, will sleep off and on from 2-7AM but \"feel like sleeping but not sleeping, dreamish place\". Reports it is an improvement from sleeping previously but next day tiredness is worse. Reports noticed improvement in night time anxiety and ruminations since starting medication (\"feeling calmer\"), still rates day to day anxiety moderately high. No panic. Denies depression, no SI/SIB/HI. No psychosis or yasmine.     Initial Impression:  11/17/22:  Felicia Bermudez is a 47 year old Black or , female who presents for initial visit with Collaborative Care Psychiatry Service (CCPS) for medication management. She presents today with CC: insomnia that is often associated with anxiety, and at times can lead to low mood. She has extensive history of treatment of insomnia, including sleep study that was negative, brief CBT-I, and extensive medication trials that are most often discontinued for intolerable side effects with limited benefit to insomnia. Discussed limited remaining medications, particularly in context of her GageIn testing, but she is open to trial of Silenor 3 mg (may increase to 6 " "mg if tolerating). Additionally discussed possibility of clonidine or guanfacine off label, or recommendation to return to CBT for insomnia. Patient is agreeable to plan. Follow-up in 2 weeks.         Current medications include:   Current Outpatient Medications   Medication Sig     doxepin (SINEQUAN) 10 MG capsule Take 1 capsule (10 mg) by mouth At Bedtime     hydrOXYzine (ATARAX) 25 MG tablet Take 1 tablet (25 mg) by mouth every 4 hours as needed for itching For hives     ibuprofen (ADVIL/MOTRIN) 200 MG capsule      Iron-Vitamin C 100-250 MG TABS      Multiple Vitamins-Minerals (MULTIVITAMIN ADULT, MINERALS, PO)      norethindrone-ethinyl estradiol (JUNEL FE 1/20) 1-20 MG-MCG tablet Take 1 tablet by mouth daily     Vitamin D2, Ergocalciferol, 50 MCG (2000 UT) CAPS      No current facility-administered medications for this visit.         Side effects: Yes: daytime somnolence and constipation    The [place] Prescription Monitoring Program has been reviewed and there are no concerns about diversionary activity for controlled substances at this time.     Psychiatric ROS:  Felicia Bermudez reports mood has been: \"calmer\"  Depression has been: self rates as 0/10, where 0 is none at all and 10 being severe depression. Was 2/10 at last appt.   SI/SIB/HI: denies all.   Anxiety has been: self rates as 5/ 10, where 0 is none at all and 10 being severe anxiety. Was 5-6/10 at last appt and reports \"has been better\". Still endorses some restlessness / feeling keyed up,  easily fatigued,  difficulty concentrating or mind going blank, muscle tension and sleep disturbances (difficulty falling / staying asleep, or restless / unsatisfying).  Sleep has been: maybe very small improvement. Sleep onset is calmer if not easier.  Energy has been: lower throughout day.  Appetite has been: denies   Opal sxs: denies  Psychosis sxs: denies   ADHD/ADD sxs: did report history of forgetfulness, difficulty concentrating    PHQ9 and GAD7 scores " were reviewed today.   PHQ-9 scores:   PHQ-9 SCORE 7/3/2020 9/1/2022 12/2/2022   PHQ-9 Total Score MyChart - - 3 (Minimal depression)   PHQ-9 Total Score 3 0 3       MALLORIE-7 scores:    MALLORIE-7 SCORE 7/3/2020 9/1/2022 12/2/2022   Total Score - - 1 (minimal anxiety)   Total Score 2 4 1         Current stressors include: Symptoms  Coping mechanisms and supports include: Exercise and Friends      Past Medical/Surgical History:  Past Medical History:   Diagnosis Date     Abnormal weight gain 2/28/2017     Adenitis 4/30/2019     Anxiety 6/9/2018     Arthritis      Dyspepsia 3/18/2019     High risk HPV infection 08/03/15    NIL, +HPV 18, plan colp     History of colposcopy 11/16/15    No bx taken     Hives     multiple allergies - food, environmental     Insomnia      Migraine without status migrainosus, not intractable 4/29/2019      has a past medical history of Abnormal weight gain (2/28/2017), Adenitis (4/30/2019), Anxiety (6/9/2018), Arthritis, Dyspepsia (3/18/2019), High risk HPV infection (08/03/15), History of colposcopy (11/16/15), Hives, Insomnia, and Migraine without status migrainosus, not intractable (4/29/2019).    She has no past medical history of Chronic infection, Complication of anesthesia, History of blood transfusion, Malignant hyperthermia, or Sleep apnea.    Medication allergies:    Allergies   Allergen Reactions     Bactrim [Sulfamethoxazole W/Trimethoprim]      Tongue swelling     Ambien      Tongue swelling     Amoxicillin      Tongue swelling     Avocado Other (See Comments) and Hives     Selling of mouth.     Cucumber Extract Other (See Comments)     Tongue swelling     Doxycycline Hives and Itching     Iodine Hives     Latex Hives     Lip swelling     Sulfa Drugs Hives     Watermelon [Citrullus Vulgaris] Hives     itching     Zolpidem Unknown     Augmentin Rash     Tongue swelling     Diagnostic X-Ray Materials Rash     Eszopiclone Swelling and Rash     Tongue swelling       Social History:  Birth  place: [place]  Childhood: Yes intact home  Siblings: 6 siblings. 2 brothers here ([place], [place]), the rest of family in [place]  Highest education level was some college.   Employment Status: full time - community health worker.  Relationship status: single  Current Living situation: just her and son.  Feels safe at home.  Children: two: son and daughter (23, 25 y/o)  Firearms/Weapons Access:  Yes No access (son's gun)         Service: No  Support: friends    Current use of drugs or alcohol: Denies   Tobacco use: No    Vital Signs:   There were no vitals taken for this visit.    Labs:  Most recent laboratory results reviewed and no new labs.     Review of Systems:  10 systems (general, cardiovascular, respiratory, eyes, ENT, endocrine, GI, , M/S, neurological) were reviewed. Most pertinent finding(s) is/are: constipation. The remaining systems are all unremarkable.      Mental Status Exam:  Alertness: alert  and oriented   Appearance: adequately groomed  Behavior/Demeanor: cooperative, pleasant and calm, with good eye contact   Speech: normal and regular rate and rhythm  Language: intact and no problems  Psychomotor: normal or unremarkable  Mood: description consistent with euthymia  Affect: full range and appropriate; was congruent to mood; was congruent to content  Thought Process/Associations: unremarkable  Thought Content:  Reports none;  Denies suicidal ideation, violent ideation and delusions  Perception:  Reports none;  Denies auditory hallucinations and visual hallucinations  Insight: intact  Judgment: intact  Cognition: does  appear grossly intact; formal cognitive testing was not done  Recent and Remote Memory: Intact to interview. Not formally assessed. No amnesia.  Attention Span and Concentration: normal  Fund of Knowledge:  appropriate  Gait and Station: unremarkable    Suicide Risk Assessment:  Today Felicia Bermudez reports no suicidal ideation. There are notable risk factors for self-harm,  including anxiety. However, risk is mitigated by commitment to family, sobriety, absence of past attempts, history of seeking help when needed and denies suicidal intent or plan. Therefore, based on all available evidence including the factors cited above, Felicia Bermudez does not appear to be at imminent risk for self-harm, does not meet criteria for a 72-hr hold, and therefore remains appropriate for ongoing outpatient level of care.  A thorough assessment of risk factors related to suicide and self-harm have been reviewed and are noted above. The patient convincingly denies suicidality on several occasions. Local community safety resources printed and reviewed for patient to use if needed. There was no deceit detected, and the patient presented in a manner that was believable.     DSM5 Diagnosis:  300.00 (F41.9) Unspecified Anxiety Disorder  780.52 (G47.00) Insomnia Disorder   With non-sleep disorder mental comorbidity  Persistent    Medical comorbidities include:   Patient Active Problem List    Diagnosis Date Noted     Abnormal menstrual cycle 09/01/2022     Priority: Medium     Abnormal uterine bleeding 09/01/2022     Priority: Medium     Abnormal vaginal bleeding 09/01/2022     Priority: Medium     Chronic fatigue syndrome 09/01/2022     Priority: Medium     Menopausal and postmenopausal disorder 09/01/2022     Priority: Medium     Difficulty sleeping 09/01/2022     Priority: Medium     Iron deficiency anemia 09/01/2022     Priority: Medium     Metrorrhagia 09/01/2022     Priority: Medium     Adenitis 04/30/2019     Priority: Medium     Migraine without status migrainosus, not intractable 04/29/2019     Priority: Medium     Vitamin D deficiency 09/20/2018     Priority: Medium     Anxiety 06/09/2018     Priority: Medium     Menorrhagia 09/12/2017     Priority: Medium     Psychophysiological insomnia 04/13/2017     Priority: Medium     Abnormal weight gain 02/28/2017     Priority: Medium     Abnormal Pap smear of  cervix 08/03/2015     Priority: Medium              High risk HPV infection 08/03/2015     Priority: Medium     Previous HPV per patient on PAP in 2013 - was told to have yearly PAPs by Juhi MOON  08/03/15 NIL, +HPV 18, plan colp  11/16/15 [place] Done. No bx taken. Dx pap NIL with Neg HPV. Plan: cotest in 1 year  01/13/17 Dx pap= NIL, Neg HPV. 3 yr co-test per [facility]  9/1/22 NIL pap, neg HR HPV. Plan 3 year cotest       Urticaria 09/16/2014     Priority: Medium     Problem list name updated by automated process. Provider to review       Multiple food allergies 09/16/2014     Priority: Medium     PUD (peptic ulcer disease) 09/16/2014     Priority: Medium       Psychosocial & Contextual Factors:  Occupational Difficulties    DIFFERENTIAL DIAGNOSIS: R/O insomnia caused by other medical condition     Medical comorbidities impacting or contributing to clinical picture: menopausal disorder, Vit D deficiency   Known issue that I take into account for their medical decisions, no current exacerbations or new concerns.    Impression:  Felicia Bermudez is a 47 year old Black or , female who presents for return visit with  Collaborative Care Psychiatry Service (CCPS) for medication management. CC at initial visit: insomnia associated with anxiety. She has extensive history of treatment of insomnia, including sleep study that was negative, brief CBT-I, and extensive medication trials that are most often discontinued for intolerable side effects with limited benefit to insomnia. Recommended trial of low dose doxepin, and Silenor PA denied so initiated 10 mg at bedtime 1 week ago. She denies intolerable side effects at this time, but does endorse AM grogginess and daytime somnolence.  Has noticed improvement in nighttime anxiety and ruminations since starting meds. Denies depression, psychosis, yasmine, SI/SIB/HI, no substance use. Has improved sleep only slightly, and recommending taking medication earlier in evening,  may trial increasing to 20 mg if AM grogginess begins to improve. Patient agreeable to plan. Follow-up in 3 weeks.    Medication side effects and alternatives were reviewed. Health promotion activities recommended and reviewed today. All questions addressed. Education and counseling completed regarding risks and benefits of medications and psychotherapy options. Recommend therapy for additional support.       Treatment Plan:    CONTINUE doxepin 10 mg at bedtime. May try 20 mg (2x 10 mg tablet) and monitor for AM grogginess. No script needed.    Continue all other medications per primary care provider.     Safety plan reviewed. To the Emergency Department as needed or call after hours crisis line at [phone] or [phone]. [place] Crisis Text Line. Text [place] to 774444 or Suicide LifeLine Chat: suicidepreIsis Biopolymerline.org/chat    Schedule an appointment with me in 3 weeks or sooner as needed. Call [facility] at [phone] to schedule.    Follow up with primary care provider as planned or for acute medical concerns.    Call the psychiatric nurse line with medication questions or concerns at [phone].    Extended Stay Americahart may be used to communicate with your provider, but this is not intended to be used for emergencies.    Patient Education:  Medication side effects and alternatives reviewed. Health promotion activities recommended and reviewed today. All questions addressed. Education and counseling completed regarding risks and benefits of medications and psychotherapy options.  Consent provided by patient/guardian  Call the psychiatric nurse line with medication questions or concerns at [phone].  Extended Stay Americahart may be used to communicate with your provider, but this is not intended to be used for emergencies.  [employer].gov is information for patients.  It is run by the [employer] of Medicine and it contains information about all disorders, diseases and all medications.      Community  Resources:    National Suicide Prevention Lifeline: 463.214.2462 (TTY: 123.889.7005). Call anytime for help.  (www.suicidepreventionlifeline.org)  National [place] on Mental Illness (www.blue.org): 904.470.7773 or 277-018-9817.   Mental Health Association (www.mentalhealth.org): 915.644.2946 or 451-653-8151.  [place] Crisis Text Line: Text [place] to 205707  Suicide LifeLine Chat: suicidebitHound.org/chat    Administrative Billin minutes spent on the date of the encounter doing chart review and reviewing referral documents, history and exam of patient, documentation and further activities as noted above.    Video/Phone Start Time: 1:05 PM  Video/Phone End Time: 1:20 PM      Patient Status:  CCPS MD/DO/NP/PA providers offer care a specialty service for Primary Care Providers in the [facility] that seek to optimize psychotropic medications for unstable patients.  Once medications have been optimized, our providers discharge the patient back to the referring Primary Care Provider for ongoing medication management.  This type of system allows our providers to serve a high volume of patients.   Patient will continue to be seen for ongoing consultation and stabilization.    Signed:   Amie Vega, MSN, APRN, PMHNP-BC  Collaborative Care Psychiatry Service (CCPS)  [facility]    Chart documentation done in part with Dragon Voice Recognition software.  Although reviewed after completion, some word and grammatical errors may remain.  Answers for HPI/ROS submitted by the patient on 2022  If you checked off any problems, how difficult have these problems made it for you to do your work, take care of things at home, or get along with other people?: Somewhat difficult  PHQ9 TOTAL SCORE: 3  MALLORIE 7 TOTAL SCORE: 1

## 2022-12-26 DIAGNOSIS — F51.04 PSYCHOPHYSIOLOGICAL INSOMNIA: ICD-10-CM

## 2022-12-26 RX ORDER — DOXEPIN HYDROCHLORIDE 10 MG/1
10 CAPSULE ORAL AT BEDTIME
Qty: 30 CAPSULE | Refills: 0 | Status: SHIPPED | OUTPATIENT
Start: 2022-12-26 | End: 2023-08-29

## 2022-12-26 NOTE — TELEPHONE ENCOUNTER
Psych providers are covering for ERIKA Vega    Behavioral Access, please schedule this patient for a future visit with  ERIKA Vega . Patient is requesting a refill.     Date of Last Office Visit: 12/2/22. RTN in 3 weeks  Date of Next Office Visit: None. Scheduling attempting to contact pt  No shows since last visit: 0  Cancellations since last visit: 0    Medication requested:doxepin (SINEQUAN) 10 MG capsule  Date last ordered: 11`/25/22 Qty: 30 Refills: 0     doxepin (SINEQUAN) 10 MG capsule 30 capsule 0 11/25/2022  --   Sig - Route: Take 1 capsule (10 mg) by mouth At Bedtime - Oral   Sent to pharmacy as: Doxepin HCl 10 MG Oral Capsule (SINEquan)   Class: E-Prescribe         Review of [place] ?: NA      Lapse in medication adherence greater than 5 days?: no  If yes, call patient and gather details: NA  Medication refill request verified as identical to current order?: yes  Result of Last DAM, VPA, Li+ Level, CBC, or Carbamazepine Level (at or since last visit): N/A    Last visit treatment plan:        Treatment Plan:    CONTINUE doxepin 10 mg at bedtime. May try 20 mg (2x 10 mg tablet) and monitor for AM grogginess. No script needed.    Continue all other medications per primary care provider.     Safety plan reviewed. To the Emergency Department as needed or call after hours crisis line at [phone] or [phone]. [place] Crisis Text Line. Text [place] to 902808 or Suicide LifeLine Chat: suicidepreventionlifeline.org/chat    Schedule an appointment with me in 3 weeks or sooner as needed. Call [facility] at [phone] to schedule.    Follow up with primary care provider as planned or for acute medical concerns.    Call the psychiatric nurse line with medication questions or concerns at [phone].    MyChart may be used to communicate with your provider, but this is not intended to be used for emergencies.    []Medication refilled per  Medication Refill in Ambulatory Care  policy.  [x]Medication  unable to be refilled by RN due to criteria not met as indicated below:    []Eligibility - not seen in the last year   [x]Supervision - no future appointment   []Compliance - no shows, cancellations or lapse in therapy   []Verification - order discrepancy   []Controlled medication   [x]Medication not included in policy   []90-day supply request   [x]Other:LPN is processing request

## 2022-12-28 ENCOUNTER — MYC MEDICAL ADVICE (OUTPATIENT)
Dept: SURGERY | Facility: CLINIC | Age: 47
End: 2022-12-28

## 2023-01-02 ENCOUNTER — OFFICE VISIT (OUTPATIENT)
Dept: SURGERY | Facility: CLINIC | Age: 48
End: 2023-01-02
Payer: COMMERCIAL

## 2023-01-02 ENCOUNTER — PRE VISIT (OUTPATIENT)
Dept: SURGERY | Facility: CLINIC | Age: 48
End: 2023-01-02

## 2023-01-02 VITALS
OXYGEN SATURATION: 98 % | DIASTOLIC BLOOD PRESSURE: 62 MMHG | HEIGHT: 64 IN | BODY MASS INDEX: 27.78 KG/M2 | SYSTOLIC BLOOD PRESSURE: 115 MMHG | HEART RATE: 91 BPM | WEIGHT: 162.7 LBS

## 2023-01-02 DIAGNOSIS — K64.4 ANAL SKIN TAG: Primary | ICD-10-CM

## 2023-01-02 DIAGNOSIS — Z12.11 ENCOUNTER FOR SCREENING COLONOSCOPY: ICD-10-CM

## 2023-01-02 PROCEDURE — 99203 OFFICE O/P NEW LOW 30 MIN: CPT | Performed by: NURSE PRACTITIONER

## 2023-01-02 ASSESSMENT — ENCOUNTER SYMPTOMS
INSOMNIA: 1
PANIC: 0
DEPRESSION: 0
NERVOUS/ANXIOUS: 0
DECREASED CONCENTRATION: 0

## 2023-01-02 ASSESSMENT — PAIN SCALES - GENERAL: PAINLEVEL: NO PAIN (0)

## 2023-01-02 NOTE — PATIENT INSTRUCTIONS
Stay on daily fiber supplement  Try adding Miralax (can titrate up to three times a day if needed)

## 2023-01-02 NOTE — PROGRESS NOTES
"Colon and Rectal Surgery Consult Clinic Note    Date: 2023     Referring provider:  Referred Self, MD  No address on file     RE: Felicia Bermudez  : 1975  YARA: 2023    Felicia Bermudez is a very pleasant 47 year old female who presents today for external hemorrhoids.    HPI:  Has external hemorrhoids. Has some bleeding with constipation. Gets some pain during menses on the hemorrhoids. Some itching. No change in size. They are always external. No anorectal surgeries in the past.  Had a colonoscopy around 10 years ago.   Has had two vaginal births. No tearing or episiotomies. No fecal incontinence.    Physical Examination:  /62 (BP Location: Left arm, Patient Position: Sitting, Cuff Size: Adult Large)   Pulse 91   Ht 5' 4\"   Wt 162 lb 11.2 oz   SpO2 98%   BMI 27.93 kg/m    General: alert, oriented, in no acute distress, sitting comfortably  HEENT: mucous membranes moist    Perianal external examination: Exam was chaperoned by Aidan Ding EMT-P   Perianal skin: Intact with no excoriation or lichenification.  Lesions: No evidence of an external lesion, nodularity, or induration in the perianal region.  Eversion of buttocks: There was not evidence of an anal fissure. Details: N/A.  Skin tags or external hemorrhoids: Yes: moderate sized skin tag in the posterior position and small skin tag in the anterior position  .  Digital rectal examination: Was deferred    Anoscopy: Was deferred    Assessment/Plan: 47 year old female with anal skin tags. We discussed surgical excision of these. Discussed risks of surgery including pain, bleeding, infection, stenosis, and recurrent skin tags. She would like to hold off on surgery for now. Recommended a colonoscopy given her age and she is agreeable to this. Discussed the importance of long term management of constipation and advised adding a laxative in addition to her fiber supplement. She can follow up as needed.    Medical history:  Past Medical History: "   Diagnosis Date     Abnormal weight gain 2/28/2017     Adenitis 4/30/2019     Anxiety 6/9/2018     Arthritis      Dyspepsia 3/18/2019     High risk HPV infection 08/03/15    NIL, +HPV 18, plan colp     History of colposcopy 11/16/15    No bx taken     Hives     multiple allergies - food, environmental     Insomnia      Migraine without status migrainosus, not intractable 4/29/2019       Surgical history:  Past Surgical History:   Procedure Laterality Date     DILATION AND CURETTAGE, HYSTEROSCOPY, ABLATE ENDOMETRIUM NOVASURE, COMBINED N/A 3/16/2018    Procedure: COMBINED DILATION AND CURETTAGE, HYSTEROSCOPY, ABLATE ENDOMETRIUM NOVASURE;  Hysteroscopy, Dilation and Curettage, unable to perform Endometrial Ablation with Novasure secondary to uterine perforation, diagnostic laparoscopy, Bilateral Laparoscopic Tubal Ligation ;  Surgeon: Danilo White MD;  Location: [facility]     EYE SURGERY      REmoval of mass left eye     LAPAROSCOPIC TUBAL LIGATION Bilateral 3/16/2018    Procedure: LAPAROSCOPIC TUBAL LIGATION;;  Surgeon: Danilo White MD;  Location: [facility] OR     LAPAROSCOPY DIAGNOSTIC (GYN)       SURGICAL HISTORY OF -       2010 turbinate surgery       Problem list:  Patient Active Problem List    Diagnosis Date Noted     Abnormal menstrual cycle 09/01/2022     Priority: Medium     Abnormal uterine bleeding 09/01/2022     Priority: Medium     Abnormal vaginal bleeding 09/01/2022     Priority: Medium     Chronic fatigue syndrome 09/01/2022     Priority: Medium     Menopausal and postmenopausal disorder 09/01/2022     Priority: Medium     Difficulty sleeping 09/01/2022     Priority: Medium     Iron deficiency anemia 09/01/2022     Priority: Medium     Metrorrhagia 09/01/2022     Priority: Medium     Adenitis 04/30/2019     Priority: Medium     Migraine without status migrainosus, not intractable 04/29/2019     Priority: Medium     Vitamin D deficiency 09/20/2018     Priority: Medium     Anxiety 06/09/2018      Priority: Medium     Menorrhagia 09/12/2017     Priority: Medium     Psychophysiological insomnia 04/13/2017     Priority: Medium     Abnormal weight gain 02/28/2017     Priority: Medium     Abnormal Pap smear of cervix 08/03/2015     Priority: Medium              High risk HPV infection 08/03/2015     Priority: Medium     Previous HPV per patient on PAP in 2013 - was told to have yearly PAPs by Juhi MOON  08/03/15 NIL, +HPV 18, plan colp  11/16/15 [facility] Done. No bx taken. Dx pap NIL with Neg HPV. Plan: cotest in 1 year  01/13/17 Dx pap= NIL, Neg HPV. 3 yr co-test per [facility]  9/1/22 NIL pap, neg HR HPV. Plan 3 year cotest       Urticaria 09/16/2014     Priority: Medium     Problem list name updated by automated process. Provider to review       Multiple food allergies 09/16/2014     Priority: Medium     PUD (peptic ulcer disease) 09/16/2014     Priority: Medium       Medications:  Current Outpatient Medications   Medication Sig Dispense Refill     hydrOXYzine (ATARAX) 25 MG tablet Take 1 tablet (25 mg) by mouth every 4 hours as needed for itching For hives 30 tablet 11     ibuprofen (ADVIL/MOTRIN) 200 MG capsule        Iron-Vitamin C 100-250 MG TABS        Multiple Vitamins-Minerals (MULTIVITAMIN ADULT, MINERALS, PO)        norethindrone-ethinyl estradiol (JUNEL FE 1/20) 1-20 MG-MCG tablet Take 1 tablet by mouth daily 84 tablet 3     Vitamin D2, Ergocalciferol, 50 MCG (2000 UT) CAPS        doxepin (SINEQUAN) 10 MG capsule Take 1 capsule (10 mg) by mouth At Bedtime (Patient not taking: Reported on 1/2/2023) 30 capsule 0       Allergies:  Allergies   Allergen Reactions     Bactrim [Sulfamethoxazole W/Trimethoprim]      Tongue swelling     Ambien      Tongue swelling     Amoxicillin      Tongue swelling     Cucumber Extract Other (See Comments)     Tongue swelling     Doxycycline Hives and Itching     Iodine Hives     Latex Hives     Lip swelling     Sulfa Drugs Hives     Watermelon [Citrullus Vulgaris] Hives      "itching     Zolpidem Unknown     Augmentin Rash     Tongue swelling     Diagnostic X-Ray Materials Rash     Eszopiclone Swelling and Rash     Tongue swelling       Family history:  Family History   Problem Relation Age of Onset     Neurologic Disorder Mother 57        brain anuerysm     Hypertension Father      Other Cancer Father 70        Testicle Cancer     Diabetes Father      Diabetes Paternal Grandmother      Other - See Comments Daughter 21        rare blood disorders     Polycythemia Daughter        Social history:  Social History     Tobacco Use     Smoking status: Never     Smokeless tobacco: Never   Substance Use Topics     Alcohol use: No     Alcohol/week: 0.0 standard drinks    Marital status: single.  Nursing Notes:   Aidan Ding, EMT  1/2/2023  4:03 PM  Signed  Chief Complaint   Patient presents with     New Patient     Hemorrhoids       Vitals:    01/02/23 1559   BP: 115/62   BP Location: Left arm   Patient Position: Sitting   Cuff Size: Adult Large   Pulse: 91   SpO2: 98%   Weight: 162 lb 11.2 oz   Height: 5' 4\"       Body mass index is 27.93 kg/m .     Aidan Ding, EMT- P         20 minutes spent on the date of encounter (excluding time performing procedures) performing chart review, history and exam, documentation and further activities as noted above    LANE Plunkett, NP-C  Colon and Rectal Surgery   [facility]    This note was created using speech recognition software and may contain unintended word substitutions.    "

## 2023-01-02 NOTE — LETTER
"2023       RE: Felicia Bermudez  [address] [facility] [address]     Dear Colleague,    Thank you for referring your patient, Felicia Bermudez, to the [facility] [place] [facility]. Please see a copy of my visit note below.    Colon and Rectal Surgery Consult Clinic Note    Date: 2023     Referring provider:  Referred Self, MD  No address on file     RE: Felicia Bermudez  : 1975  YARA: 2023    Felicia Bermudez is a very pleasant 47 year old female who presents today for external hemorrhoids.    HPI:  Has external hemorrhoids. Has some bleeding with constipation. Gets some pain during menses on the hemorrhoids. Some itching. No change in size. They are always external. No anorectal surgeries in the past.  Had a colonoscopy around 10 years ago.   Has had two vaginal births. No tearing or episiotomies. No fecal incontinence.    Physical Examination:  /62 (BP Location: Left arm, Patient Position: Sitting, Cuff Size: Adult Large)   Pulse 91   Ht 5' 4\"   Wt 162 lb 11.2 oz   SpO2 98%   BMI 27.93 kg/m    General: alert, oriented, in no acute distress, sitting comfortably  HEENT: mucous membranes moist    Perianal external examination: Exam was chaperoned by Aidan Ding, EMT-P   Perianal skin: Intact with no excoriation or lichenification.  Lesions: No evidence of an external lesion, nodularity, or induration in the perianal region.  Eversion of buttocks: There was not evidence of an anal fissure. Details: N/A.  Skin tags or external hemorrhoids: Yes: moderate sized skin tag in the posterior position and small skin tag in the anterior position  .  Digital rectal examination: Was deferred    Anoscopy: Was deferred    Assessment/Plan: 47 year old female with anal skin tags. We discussed surgical excision of these. Discussed risks of surgery including pain, bleeding, infection, stenosis, and recurrent skin " tags. She would like to hold off on surgery for now. Recommended a colonoscopy given her age and she is agreeable to this. Discussed the importance of long term management of constipation and advised adding a laxative in addition to her fiber supplement. She can follow up as needed.    Medical history:  Past Medical History:   Diagnosis Date     Abnormal weight gain 2/28/2017     Adenitis 4/30/2019     Anxiety 6/9/2018     Arthritis      Dyspepsia 3/18/2019     High risk HPV infection 08/03/15    NIL, +HPV 18, plan colp     History of colposcopy 11/16/15    No bx taken     Hives     multiple allergies - food, environmental     Insomnia      Migraine without status migrainosus, not intractable 4/29/2019       Surgical history:  Past Surgical History:   Procedure Laterality Date     DILATION AND CURETTAGE, HYSTEROSCOPY, ABLATE ENDOMETRIUM NOVASURE, COMBINED N/A 3/16/2018    Procedure: COMBINED DILATION AND CURETTAGE, HYSTEROSCOPY, ABLATE ENDOMETRIUM NOVASURE;  Hysteroscopy, Dilation and Curettage, unable to perform Endometrial Ablation with Novasure secondary to uterine perforation, diagnostic laparoscopy, Bilateral Laparoscopic Tubal Ligation ;  Surgeon: Danilo White MD;  Location: [facility] OR     EYE SURGERY      REmoval of mass left eye     LAPAROSCOPIC TUBAL LIGATION Bilateral 3/16/2018    Procedure: LAPAROSCOPIC TUBAL LIGATION;;  Surgeon: Danilo White MD;  Location: [facility] OR     LAPAROSCOPY DIAGNOSTIC (GYN)       SURGICAL HISTORY OF -       2010 turbinate surgery       Problem list:  Patient Active Problem List    Diagnosis Date Noted     Abnormal menstrual cycle 09/01/2022     Priority: Medium     Abnormal uterine bleeding 09/01/2022     Priority: Medium     Abnormal vaginal bleeding 09/01/2022     Priority: Medium     Chronic fatigue syndrome 09/01/2022     Priority: Medium     Menopausal and postmenopausal disorder 09/01/2022     Priority: Medium     Difficulty sleeping 09/01/2022     Priority: Medium      Iron deficiency anemia 09/01/2022     Priority: Medium     Metrorrhagia 09/01/2022     Priority: Medium     Adenitis 04/30/2019     Priority: Medium     Migraine without status migrainosus, not intractable 04/29/2019     Priority: Medium     Vitamin D deficiency 09/20/2018     Priority: Medium     Anxiety 06/09/2018     Priority: Medium     Menorrhagia 09/12/2017     Priority: Medium     Psychophysiological insomnia 04/13/2017     Priority: Medium     Abnormal weight gain 02/28/2017     Priority: Medium     Abnormal Pap smear of cervix 08/03/2015     Priority: Medium              High risk HPV infection 08/03/2015     Priority: Medium     Previous HPV per patient on PAP in 2013 - was told to have yearly PAPs by Juhi MOON  08/03/15 NIL, +HPV 18, plan colp  11/16/15 [place] Done. No bx taken. Dx pap NIL with Neg HPV. Plan: cotest in 1 year  01/13/17 Dx pap= NIL, Neg HPV. 3 yr co-test per [facility]  9/1/22 NIL pap, neg HR HPV. Plan 3 year cotest       Urticaria 09/16/2014     Priority: Medium     Problem list name updated by automated process. Provider to review       Multiple food allergies 09/16/2014     Priority: Medium     PUD (peptic ulcer disease) 09/16/2014     Priority: Medium       Medications:  Current Outpatient Medications   Medication Sig Dispense Refill     hydrOXYzine (ATARAX) 25 MG tablet Take 1 tablet (25 mg) by mouth every 4 hours as needed for itching For hives 30 tablet 11     ibuprofen (ADVIL/MOTRIN) 200 MG capsule        Iron-Vitamin C 100-250 MG TABS        Multiple Vitamins-Minerals (MULTIVITAMIN ADULT, MINERALS, PO)        norethindrone-ethinyl estradiol (JUNEL FE 1/20) 1-20 MG-MCG tablet Take 1 tablet by mouth daily 84 tablet 3     Vitamin D2, Ergocalciferol, 50 MCG (2000 UT) CAPS        doxepin (SINEQUAN) 10 MG capsule Take 1 capsule (10 mg) by mouth At Bedtime (Patient not taking: Reported on 1/2/2023) 30 capsule 0       Allergies:  Allergies   Allergen Reactions     Bactrim [Sulfamethoxazole  "W/Trimethoprim]      Tongue swelling     Ambien      Tongue swelling     Amoxicillin      Tongue swelling     Cucumber Extract Other (See Comments)     Tongue swelling     Doxycycline Hives and Itching     Iodine Hives     Latex Hives     Lip swelling     Sulfa Drugs Hives     Watermelon [Citrullus Vulgaris] Hives     itching     Zolpidem Unknown     Augmentin Rash     Tongue swelling     Diagnostic X-Ray Materials Rash     Eszopiclone Swelling and Rash     Tongue swelling       Family history:  Family History   Problem Relation Age of Onset     Neurologic Disorder Mother 57        brain anuerysm     Hypertension Father      Other Cancer Father 70        Testicle Cancer     Diabetes Father      Diabetes Paternal Grandmother      Other - See Comments Daughter 21        rare blood disorders     Polycythemia Daughter        Social history:  Social History     Tobacco Use     Smoking status: Never     Smokeless tobacco: Never   Substance Use Topics     Alcohol use: No     Alcohol/week: 0.0 standard drinks    Marital status: single.  Nursing Notes:   Aidan Ding, EMT  1/2/2023  4:03 PM  Signed  Chief Complaint   Patient presents with     New Patient     Hemorrhoids       Vitals:    01/02/23 1559   BP: 115/62   BP Location: Left arm   Patient Position: Sitting   Cuff Size: Adult Large   Pulse: 91   SpO2: 98%   Weight: 162 lb 11.2 oz   Height: 5' 4\"       Body mass index is 27.93 kg/m .     Aidan Ding, EMT- P         20 minutes spent on the date of encounter (excluding time performing procedures) performing chart review, history and exam, documentation and further activities as noted above      This note was created using speech recognition software and may contain unintended word substitutions.        Again, thank you for allowing me to participate in the care of your patient.      Sincerely,    LANE Price CNP      "

## 2023-01-02 NOTE — NURSING NOTE
"Chief Complaint   Patient presents with     New Patient     Hemorrhoids       Vitals:    01/02/23 1559   BP: 115/62   BP Location: Left arm   Patient Position: Sitting   Cuff Size: Adult Large   Pulse: 91   SpO2: 98%   Weight: 162 lb 11.2 oz   Height: 5' 4\"       Body mass index is 27.93 kg/m .     Aidan Ding, EMT- P    "

## 2023-01-22 ENCOUNTER — HEALTH MAINTENANCE LETTER (OUTPATIENT)
Age: 48
End: 2023-01-22

## 2023-02-01 ENCOUNTER — HOSPITAL ENCOUNTER (OUTPATIENT)
Dept: MAMMOGRAPHY | Facility: CLINIC | Age: 48
Discharge: HOME | End: 2023-02-01
Attending: INTERNAL MEDICINE | Admitting: INTERNAL MEDICINE
Payer: COMMERCIAL

## 2023-02-01 DIAGNOSIS — Z12.31 ENCOUNTER FOR SCREENING MAMMOGRAM FOR MALIGNANT NEOPLASM OF BREAST: ICD-10-CM

## 2023-02-01 PROCEDURE — 77067 SCR MAMMO BI INCL CAD: CPT

## 2023-03-02 ENCOUNTER — TELEPHONE (OUTPATIENT)
Dept: GASTROENTEROLOGY | Facility: CLINIC | Age: 48
End: 2023-03-02
Payer: COMMERCIAL

## 2023-03-02 NOTE — TELEPHONE ENCOUNTER
Screening Questions  BLUE  KIND OF PREP RED  LOCATION [review exclusion criteria] GREEN  SEDATION TYPE        Y Are you active on mychart?       VON SERGIO MARWITZOrdering/Referring Provider?        R [facility] What type of coverage do you have?      N Have you had a positive covid test in the last 14 days?     28.2 1. BMI  [BMI 40+ - review exclusion criteria]    Y  2. Are you able to give consent for your medical care? [IF NO,RN REVIEW]          N  3. Are you taking any prescription pain medications on a routine schedule   (ex narcotics: oxycodone, roxicodone, oxycontin,  and percocet)? [RN Review]          3a. EXTENDED PREP What kind of prescription?     N 4. Do you have any chemical dependencies such as alcohol, street drugs, or methadone?        **If yes 3- 5 , please schedule with MAC sedation.**          IF YES TO ANY 6 - 10 - HOSPITAL SETTING ONLY.     N 6.   Do you need assistance transferring?     N 7.   Have you had a heart or lung transplant?    N 8.   Are you currently on dialysis?   N 9.   Do you use daily home oxygen?   N 10. Do you take nitroglycerin?   10a.  If yes, how often?     11. [FEMALES]  N Are you currently pregnant?    11a.  If yes, how many weeks? [ Greater than 12 weeks, OR NEEDED]    N 12. Do you have Pulmonary Hypertension? *NEED PAC APPT AT [facility] w/ [facility]*     N 13. [review exclusion criteria]  Do you have any implantable devices in your body (pacemaker, defib, LVAD)?    N 14. In the past 6 months, have you had any heart related issues including cardiomyopathy or heart attack?     14a.  If yes, did it require cardiac stenting if so when?     N 15. Have you had a stroke or Transient ischemic attack (TIA - aka  mini stroke ) within 6 months?      N 16. Do you have mod to severe Obstructive Sleep Apnea?  [Hospital only]    N 17. Do you have SEVERE AND UNCONTROLLED asthma? *NEED PAC APPT AT [facility] w/MAC*     18. Are you currently taking any blood thinners?     18a. No. Continue to 19.   18b. Yes/no  "Blood Thinner: No [CONTINUE TO #19]    N 19. Do you take the medication Phentermine?    19a. If yes, \"Hold for 7 days before procedure.  Please consult your prescribing provider if you have questions about holding this medication.\"     N  20. Do you have chronic kidney disease?      N  21. Do you have a diagnosis of diabetes?     N  22. On a regular basis do you go 3-5 days between bowel movements?      23. Preferred LOCAL Pharmacy for Pre Prescription    [ LIST ONLY ONE PHARMACY]     [id number] - [place], [place] - [address] [place] [address] [address] AT [facility] [address]    - CLOSING REMINDERS -    Informed patient they will need an adult    Cannot take any type of public or medical transportation alone    Conscious Sedation- Needs  for 6 hours after the procedure       MAC/General-Needs  for 24 hours after procedure    Pre-Procedure Covid test to be completed [[facility] PCR Testing Required]    Confirmed Nurse will call to complete assessment       - SCHEDULING DETAILS -  NO Hospital Setting Required? If yes, what is the exclusion?:    NANCY  Surgeon    5/1  Date of Procedure  Lower Endoscopy [Colonoscopy]  [facility]   [facility]ARD GOLYTELY-If you answer yes to questions #8, #20, #21Which Colonoscopy Prep was Sent?     MAC per order Sedation Type     Y, pt will schedule PAC / Pre-op Required                 "

## 2023-04-25 RX ORDER — BISACODYL 5 MG/1
TABLET, DELAYED RELEASE ORAL
Qty: 4 TABLET | Refills: 0 | Status: SHIPPED | OUTPATIENT
Start: 2023-04-25 | End: 2023-08-29

## 2023-04-30 ENCOUNTER — HEALTH MAINTENANCE LETTER (OUTPATIENT)
Age: 48
End: 2023-04-30

## 2023-04-30 RX ORDER — ONDANSETRON 2 MG/ML
4 INJECTION INTRAMUSCULAR; INTRAVENOUS
Status: CANCELLED | OUTPATIENT
Start: 2023-04-30

## 2023-04-30 RX ORDER — LIDOCAINE 40 MG/G
CREAM TOPICAL
Status: CANCELLED | OUTPATIENT
Start: 2023-04-30

## 2023-05-01 ENCOUNTER — HOSPITAL ENCOUNTER (OUTPATIENT)
Facility: CLINIC | Age: 48
Discharge: HOME | End: 2023-05-01
Attending: COLON & RECTAL SURGERY | Admitting: COLON & RECTAL SURGERY
Payer: COMMERCIAL

## 2023-05-01 VITALS
OXYGEN SATURATION: 100 % | SYSTOLIC BLOOD PRESSURE: 114 MMHG | BODY MASS INDEX: 28 KG/M2 | WEIGHT: 158 LBS | DIASTOLIC BLOOD PRESSURE: 63 MMHG | HEART RATE: 68 BPM | RESPIRATION RATE: 14 BRPM | HEIGHT: 63 IN

## 2023-05-01 DIAGNOSIS — Z12.11 ENCOUNTER FOR SCREENING COLONOSCOPY: Primary | ICD-10-CM

## 2023-05-01 LAB — COLONOSCOPY: NORMAL

## 2023-05-01 PROCEDURE — 250N000011 HC RX IP 250 OP 636: Performed by: COLON & RECTAL SURGERY

## 2023-05-01 PROCEDURE — 45378 DIAGNOSTIC COLONOSCOPY: CPT | Performed by: COLON & RECTAL SURGERY

## 2023-05-01 PROCEDURE — G0121 COLON CA SCRN NOT HI RSK IND: HCPCS | Performed by: COLON & RECTAL SURGERY

## 2023-05-01 PROCEDURE — G0500 MOD SEDAT ENDO SERVICE >5YRS: HCPCS | Performed by: COLON & RECTAL SURGERY

## 2023-05-01 RX ORDER — FENTANYL CITRATE 50 UG/ML
INJECTION, SOLUTION INTRAMUSCULAR; INTRAVENOUS PRN
Status: DISCONTINUED | OUTPATIENT
Start: 2023-05-01 | End: 2023-05-01 | Stop reason: HOSPADM

## 2023-05-01 ASSESSMENT — ACTIVITIES OF DAILY LIVING (ADL)
ADLS_ACUITY_SCORE: 35
ADLS_ACUITY_SCORE: 35

## 2023-05-01 NOTE — H&P
Colon & Rectal Surgery History and Physical  Pre-Endoscopy Procedure Note    History of Present Illness   I have been asked by Dr. Arredondo to evaluate this 47 year old female for colorectal cancer screening. She currently denies any abdominal pain, weight loss, bleeding per rectum, or recent change in bowel habits.    Past Medical History  Diagnosis Date     Abnormal weight gain 2/28/2017     Adenitis 4/30/2019     Anxiety 6/9/2018     Arthritis      Dyspepsia 3/18/2019     High risk HPV infection 08/03/15    NIL, +HPV 18, plan colp     Hives     multiple allergies - food, environmental     Insomnia      Migraine without status migrainosus, not intractable 4/29/2019       Past Surgical History  Procedure Laterality Date     DILATION AND CURETTAGE, HYSTEROSCOPY, ABLATE ENDOMETRIUM NOVASURE, COMBINED N/A 3/16/2018    Procedure: COMBINED DILATION AND CURETTAGE, HYSTEROSCOPY, ABLATE ENDOMETRIUM NOVASURE;  Hysteroscopy, Dilation and Curettage, unable to perform Endometrial Ablation with Novasure secondary to uterine perforation, diagnostic laparoscopy, Bilateral Laparoscopic Tubal Ligation ;  Surgeon: Danilo White MD;  Location: [facility] OR     EYE SURGERY      REmoval of mass left eye     LAPAROSCOPIC TUBAL LIGATION Bilateral 3/16/2018    Procedure: LAPAROSCOPIC TUBAL LIGATION;;  Surgeon: Danilo White MD;  Location: [facility] OR     LAPAROSCOPY DIAGNOSTIC (GYN)       SURGICAL HISTORY OF -       2010 turbinate surgery        Medications  Medication Sig     doxepin (SINEQUAN) 10 MG capsule Take 1 capsule (10 mg) by mouth At Bedtime      hydrOXYzine (ATARAX) 25 MG tablet Take 1 tablet (25 mg) by mouth every 4 hours as needed for itching For hives     ibuprofen (ADVIL/MOTRIN) 200 MG capsule      Iron-Vitamin C 100-250 MG TABS      Multiple Vitamins-Minerals (MULTIVITAMIN ADULT, MINERALS, PO)      norethindrone-ethinyl estradiol (JUNEL FE 1/20) 1-20 MG-MCG tablet Take 1 tablet by mouth daily     Vitamin D2, Ergocalciferol, 50  MCG (2000 UT) CAPS        Allergies  Allergen Reactions     Bactrim [Sulfamethoxazole-Trimethoprim]      Tongue swelling     Amoxicillin      Tongue swelling     Cucumber Extract      Tongue swelling     Doxycycline Hives and Itching     Iodine Hives     Latex Hives     Lip swelling     Sulfa Antibiotics Hives     Watermelon [Citrullus Vulgaris] Hives     itching     Zolpidem Unknown     Zolpidem Tartrate      Tongue swelling     Amoxicillin-Pot Clavulanate Rash     Tongue swelling     Eszopiclone Swelling and Rash     Tongue swelling     Iodinated Contrast Media Rash        Family History   Family history includes Diabetes in her father and paternal grandmother; Hypertension in her father; Neurologic Disorder (age of onset: 57) in her mother; Other Cancer (age of onset: 70) in her father; Polycythemia in her daughter.     Social History   She reports that she has never smoked. She has never used smokeless tobacco. She reports that she does not drink alcohol and does not use drugs.    Review of Systems   Constitutional:  No fever, weight change or fatigue.    Eyes:     No dry eyes or vision changes.   Ears/Nose/Throat/Neck:  No oral ulcers, sore throat or voice change.    Cardiovascular:   No palpitations, syncope, angina or edema.   Respiratory:    No chest pain, excessive sleepiness, shortness of breath or hemoptysis.    Gastrointestinal:   No abdominal pain, nausea, vomiting, diarrhea or heartburn.    Genitourinary:   No dysuria, hematuria, urinary retention or urinary frequency.   Musculoskeletal:  No joint swelling or arthralgias.    Dermatologic:  No skin rash or other skin changes.   Neurologic:    No focal weakness or numbness. No neuropathy.   Psychiatric:    No depression, anxiety, suicidal ideation, or paranoid ideation.   Endocrine:   No cold or heat intolerance, polydipsia, hirsutism, change in libido, or flushing.   Hematology/Lymphatic:  No bleeding or lymphadenopathy.    Allergy/Immunology:  No  "rhinitis or hives.     Physical Exam   Vitals:  /70, HR 74, height 1.6 m (5' 3\"), weight 71.7 kg (158 lb), SpO2 100 %, not currently breastfeeding.    General:  Alert and oriented to person, place and time   Airway: Normal oropharyngeal airway and neck mobility   Lungs:  Clear bilaterally   Heart:  Regular rate and rhythm   Abdomen: Soft, NT, ND, no masses   Extremities: Warm, good capillary refill    ASA Grade: II (mild systemic disease)    Impression: Cleared for use of conscious sedation for colorectal cancer screening    Plan: Proceed with colonoscopy     Page Neal MD  [facility]  [phone]  "

## 2023-08-29 ENCOUNTER — VIRTUAL VISIT (OUTPATIENT)
Dept: PEDIATRICS | Facility: CLINIC | Age: 48
End: 2023-08-29
Payer: COMMERCIAL

## 2023-08-29 DIAGNOSIS — F41.9 ANXIETY: Primary | ICD-10-CM

## 2023-08-29 PROBLEM — G47.9 DIFFICULTY SLEEPING: Status: RESOLVED | Noted: 2022-09-01 | Resolved: 2023-08-29

## 2023-08-29 PROBLEM — N92.1 METRORRHAGIA: Status: RESOLVED | Noted: 2022-09-01 | Resolved: 2023-08-29

## 2023-08-29 PROBLEM — N93.9 ABNORMAL VAGINAL BLEEDING: Status: RESOLVED | Noted: 2022-09-01 | Resolved: 2023-08-29

## 2023-08-29 PROBLEM — N93.9 ABNORMAL UTERINE BLEEDING: Status: RESOLVED | Noted: 2022-09-01 | Resolved: 2023-08-29

## 2023-08-29 PROBLEM — R63.5 ABNORMAL WEIGHT GAIN: Status: RESOLVED | Noted: 2017-02-28 | Resolved: 2023-08-29

## 2023-08-29 PROBLEM — N92.6 ABNORMAL MENSTRUAL CYCLE: Status: RESOLVED | Noted: 2022-09-01 | Resolved: 2023-08-29

## 2023-08-29 PROBLEM — I88.9 ADENITIS: Status: RESOLVED | Noted: 2019-04-30 | Resolved: 2023-08-29

## 2023-08-29 PROCEDURE — 99214 OFFICE O/P EST MOD 30 MIN: CPT | Mod: 95 | Performed by: INTERNAL MEDICINE

## 2023-08-29 RX ORDER — VENLAFAXINE HYDROCHLORIDE 37.5 MG/1
37.5 CAPSULE, EXTENDED RELEASE ORAL DAILY
Qty: 30 CAPSULE | Refills: 1 | Status: SHIPPED | OUTPATIENT
Start: 2023-08-29 | End: 2023-10-26

## 2023-08-29 ASSESSMENT — ANXIETY QUESTIONNAIRES
7. FEELING AFRAID AS IF SOMETHING AWFUL MIGHT HAPPEN: SEVERAL DAYS
GAD7 TOTAL SCORE: 6
GAD7 TOTAL SCORE: 6
3. WORRYING TOO MUCH ABOUT DIFFERENT THINGS: SEVERAL DAYS
2. NOT BEING ABLE TO STOP OR CONTROL WORRYING: SEVERAL DAYS
6. BECOMING EASILY ANNOYED OR IRRITABLE: SEVERAL DAYS
4. TROUBLE RELAXING: SEVERAL DAYS
IF YOU CHECKED OFF ANY PROBLEMS ON THIS QUESTIONNAIRE, HOW DIFFICULT HAVE THESE PROBLEMS MADE IT FOR YOU TO DO YOUR WORK, TAKE CARE OF THINGS AT HOME, OR GET ALONG WITH OTHER PEOPLE: SOMEWHAT DIFFICULT
1. FEELING NERVOUS, ANXIOUS, OR ON EDGE: SEVERAL DAYS
5. BEING SO RESTLESS THAT IT IS HARD TO SIT STILL: NOT AT ALL

## 2023-08-29 ASSESSMENT — PATIENT HEALTH QUESTIONNAIRE - PHQ9
10. IF YOU CHECKED OFF ANY PROBLEMS, HOW DIFFICULT HAVE THESE PROBLEMS MADE IT FOR YOU TO DO YOUR WORK, TAKE CARE OF THINGS AT HOME, OR GET ALONG WITH OTHER PEOPLE: SOMEWHAT DIFFICULT
SUM OF ALL RESPONSES TO PHQ QUESTIONS 1-9: 10
SUM OF ALL RESPONSES TO PHQ QUESTIONS 1-9: 10

## 2023-08-29 NOTE — PATIENT INSTRUCTIONS
"Continue the activities in\"Full Catastrophe Living\", by Rafita Boo, and begin the course described therein.     Call for counseling:  [facility] at [phone]      Let's begin a dose of effexor 37.5 mg daily.      Set up a follow up in 1 month or so.     Take hydroxyzine as needed.    In 1 month if not improved, we can consider increasing effexor dose, or adding in a nighttime dose of Clonidine.    Jose Daniel Arredondo MD  Internal Medicine and Pediatrics     "

## 2023-08-29 NOTE — PROGRESS NOTES
"Felicia is a 47 year old who is being evaluated via a billable telephone visit.      What phone number would you like to be contacted at? [phone]  How would you like to obtain your AVS? Antonio    Distant Location (provider location):  On-site    Assessment & Plan     Anxiety  Not able to afford psychiatry, or counseling.  Has never been on effexor, so will initiate this slowly.  Would then uptitrate and consider adding nighttime clonidine.          Patient Instructions   Continue the activities in\"Full Catastrophe Living\", by Rafita Boo, and begin the course described therein.     Call for counseling:  [facility] at [phone]      Let's begin a dose of effexor 37.5 mg daily.      Set up a follow up in 1 month or so.     Take hydroxyzine as needed.    In 1 month if not improved, we can consider increasing effexor dose, or adding in a nighttime dose of Clonidine.    Jose Daniel Arredondo MD  Internal Medicine and Pediatrics      - venlafaxine (EFFEXOR XR) 37.5 MG 24 hr capsule; Take 1 capsule (37.5 mg) by mouth daily             BMI:   Estimated body mass index is 27.99 kg/m  as calculated from the following:    Height as of 5/1/23: 1.6 m (5' 3\").    Weight as of 5/1/23: 71.7 kg (158 lb).       Depression Screening Follow Up        8/29/2023     3:07 PM   PHQ   PHQ-9 Total Score 10   Q9: Thoughts of better off dead/self-harm past 2 weeks Not at all         Follow Up Actions Taken  Crisis resource information provided in After Visit Summary         Jose Daniel Arredondo MD  [facility]    Georges Duarte is a 47 year old, presenting for the following health issues:  No chief complaint on file.      History of Present Illness       Mental Health Follow-up:  Patient presents to follow-up on Anxiety.    Patient's anxiety since last visit has been:  Worse  The patient is not having other symptoms associated with anxiety.  Any significant life events: No  Patient is feeling anxious or having " panic attacks.  Patient has no concerns about alcohol or drug use.    She eats 4 or more servings of fruits and vegetables daily.She consumes 1 sweetened beverage(s) daily.She exercises with enough effort to increase her heart rate 30 to 60 minutes per day.  She exercises with enough effort to increase her heart rate 7 days per week.   She is taking medications regularly.             Has been having more anxiety recently.  Since April.  Yesterday had shaking and heart beating fast.  Trouble relaxing, constantly tired.  Last 4 weeks has worsened.    No known triggers.  Does recall that during recent [place] trip was worried about traveling.     Sleeping is about the same.  Gets only 4 hours per night.  No napping.    No drugs or alcohol.  Did try some CBD, but this caused an allergic itchiness and runny nose.     No significant change in appetite and weight.  Does not overeat.      Only current med is hydroxyzine for hives.      In past has been on pristigue, fluoxetine, buspar, trazodone, mirtazapine, amitriptyline, wellbutrin.       Last visit with psych was in 11/22 and 12/22.  Silenor caused her to be unable to move her neck.  Doxepin caused neck and stomach problems.     11/17/22:    Felicia Bermudez is a 47 year old Black or , female who presents for initial visit with [facility] ([facility]) for medication management. She presents today with CC: insomnia that is often associated with anxiety, and at times can lead to low mood. She has extensive history of treatment of insomnia, including sleep study that was negative, brief CBT-I, and extensive medication trials that are most often discontinued for intolerable side effects with limited benefit to insomnia. Discussed limited remaining medications, particularly in context of her GeneSight testing, but she is open to trial of Silenor 3 mg (may increase to 6 mg if tolerating). Additionally discussed possibility of clonidine or  guanfacine off label, or recommendation to return to CBT for insomnia. Patient is agreeable to plan. Follow-up in 2 weeks.      12/22:  Impression:  Felicia Bermudez is a 47 year old Black or , female who presents for return visit with  [facility] ([facility]) for medication management. CC at initial visit: insomnia associated with anxiety. She has extensive history of treatment of insomnia, including sleep study that was negative, brief CBT-I, and extensive medication trials that are most often discontinued for intolerable side effects with limited benefit to insomnia. Recommended trial of low dose doxepin, and Silenor PA denied so initiated 10 mg at bedtime 1 week ago. She denies intolerable side effects at this time, but does endorse AM grogginess and daytime somnolence.  Has noticed improvement in nighttime anxiety and ruminations since starting meds. Denies depression, psychosis, yasmine, SI/SIB/HI, no substance use. Has improved sleep only slightly, and recommending taking medication earlier in evening, may trial increasing to 20 mg if AM grogginess begins to improve. Patient agreeable to plan. Follow-up in 3 weeks.     Medication side effects and alternatives were reviewed. Health promotion activities recommended and reviewed today. All questions addressed. Education and counseling completed regarding risks and benefits of medications and psychotherapy options. Recommend therapy for additional support.         Treatment Plan:  CONTINUE doxepin 10 mg at bedtime. May try 20 mg (2x 10 mg tablet) and monitor for AM grogginess. No script needed.  Continue all other medications per primary care provider.   Safety plan reviewed. To the Emergency Department as needed or call after hours crisis line at [phone] or [phone]. [place] Crisis Text Line. Text [place] to 028941 or Suicide LifeLine Chat: suicidepreventionlifeline.org/chat  Schedule an appointment with me in 3 weeks or  sooner as needed. Call [facility] at [phone] to schedule.  Follow up with primary care provider as planned or for acute medical concerns.  Call the psychiatric nurse line with medication questions or concerns at [phone].  [email or web address] may be used to communicate with your provider, but this is not intended to be used for emergencies.         Review of Systems   CONSTITUTIONAL: NEGATIVE for fever, chills, change in weight  INTEGUMENTARY/SKIN: NEGATIVE for worrisome rashes, moles or lesions  EYES: NEGATIVE for vision changes or irritation  ENT/MOUTH: NEGATIVE for ear, mouth and throat problems  RESP: NEGATIVE for significant cough or SOB  CV: NEGATIVE for chest pain, palpitations or peripheral edema  GI: NEGATIVE for nausea, abdominal pain, heartburn, or change in bowel habits  : NEGATIVE for frequency, dysuria, or hematuria  MUSCULOSKELETAL: NEGATIVE for significant arthralgias or myalgia  NEURO: NEGATIVE for weakness, dizziness or paresthesias  ENDOCRINE: NEGATIVE for temperature intolerance, skin/hair changes  HEME: NEGATIVE for bleeding problems      Objective           Vitals:  No vitals were obtained today due to virtual visit.    Physical Exam   healthy, alert, and no distress  PSYCH: Alert and oriented times 3; coherent speech, normal   rate and volume, able to articulate logical thoughts, able   to abstract reason, no tangential thoughts, no hallucinations   or delusions  Her affect is normal  RESP: No cough, no audible wheezing, able to talk in full sentences  Remainder of exam unable to be completed due to telephone visits                Phone call duration: 18 minutes

## 2023-09-06 ENCOUNTER — MYC MEDICAL ADVICE (OUTPATIENT)
Dept: PEDIATRICS | Facility: CLINIC | Age: 48
End: 2023-09-06
Payer: COMMERCIAL

## 2023-09-06 NOTE — TELEPHONE ENCOUNTER
Patient sending [employer] Direct message regarding possible side effects of venlafaxine, stomach burning. Please review and advise.     Surendra SINGH RN 9/6/2023 at 3:45 PM

## 2023-09-25 NOTE — MR AVS SNAPSHOT
After Visit Summary   6/25/2018    Felicia Bermudez    MRN: [id number]           Patient Information     Date Of Birth          1975        Visit Information        Provider Department      6/25/2018 5:00 PM Danilo White MD [facility]        Today's Diagnoses     Menorrhagia with regular cycle    -  1       Follow-ups after your visit        Who to contact     If you have questions or need follow up information about today's clinic visit or your schedule please contact [facility] directly at [phone].  Normal or non-critical lab and imaging results will be communicated to you by MyChart, letter or phone within 4 business days after the clinic has received the results. If you do not hear from us within 7 days, please contact the clinic through MyChart or phone. If you have a critical or abnormal lab result, we will notify you by phone as soon as possible.  Submit refill requests through [employer] or call your pharmacy and they will forward the refill request to us. Please allow 3 business days for your refill to be completed.          Additional Information About Your Visit        Care EveryWhere ID     This is your Care EveryWhere ID. This could be used by other organizations to access your [facility] medical records  [id number]        Your Vitals Were     Pulse Last Period Breastfeeding? BMI (Body Mass Index)          64 06/11/2018 No 28.52 kg/m2         Blood Pressure from Last 3 Encounters:   06/25/18 118/74   06/22/18 118/72   06/09/18 100/62    Weight from Last 3 Encounters:   06/25/18 161 lb (73 kg)   06/09/18 159 lb (72.1 kg)   05/28/18 157 lb (71.2 kg)              Today, you had the following     No orders found for display         Today's Medication Changes          These changes are accurate as of 6/25/18  5:43 PM.  If you have any questions, ask your nurse or doctor.               Start taking these medicines.        Dose/Directions     medroxyPROGESTERone 10 MG tablet   Commonly known as:  PROVERA   Used for:  Menorrhagia with regular cycle   Started by:  Danilo White MD        Take 1- 4 pills per day until bleeding stops and then one per day; after bleeding stops take for 2 weeks and then stop   Quantity:  90 tablet   Refills:  1            Where to get your medicines      These medications were sent to [facility] [place], [place] - [address]  [address] [facility] [address]     Phone:  [phone]     medroxyPROGESTERone 10 MG tablet                Primary Care Provider Office Phone # Fax #    Jose Daniel Arredondo [phone] [address] [place] [address]        Equal Access to Services     [facility]: Hadii derek saavedra hadasho Soomaali, waaxda luqadaha, qaybta kaalmada adeegyada, waxay rhiannonin haychicho zuniga . So [facility] [phone].    ATENCIÓN: Si habla español, tiene a paige disposición servicios gratuitos de asistencia lingüística. [facility] [phone].    We comply with applicable federal civil rights laws and [place] laws. We do not discriminate on the basis of race, color, national origin, age, disability, sex, sexual orientation, or gender identity.            Thank you!     Thank you for choosing [facility]  for your care. Our goal is always to provide you with excellent care. Hearing back from our patients is one way we can continue to improve our services. Please take a few minutes to complete the written survey that you may receive in the mail after your visit with us. Thank you!             Your Updated Medication List - Protect others around you: Learn how to safely use, store and throw away your medicines at www.disposemymeds.org.          This list is accurate as of 6/25/18  5:43 PM.  Always use your most recent med list.                   Brand Name Dispense Instructions for use Diagnosis    busPIRone 5 MG tablet    BUSPAR     30 tablet    Take 1 tablet (5 mg) by mouth 2 times daily    Anxiety       cetirizine 10 MG tablet    zyrTEC    30 tablet    Take 1 tablet (10 mg) by mouth every evening    Multiple allergies       FLUoxetine 10 MG capsule    PROzac    30 capsule    Take 1 capsule (10 mg) by mouth daily    Anxiety       HYDROXYZINE HCL PO      Take 20 mg by mouth every 4 hours as needed For hives        IBUPROFEN PO      Take 400 mg by mouth every 6 hours as needed        medroxyPROGESTERone 10 MG tablet    PROVERA    90 tablet    Take 1- 4 pills per day until bleeding stops and then one per day; after bleeding stops take for 2 weeks and then stop    Menorrhagia with regular cycle       MULTIVITAMIN PO           vitamin D 10676 UNIT capsule    ERGOCALCIFEROL    4 capsule    Take 1 capsule (50,000 Units) by mouth every 7 days for 12 doses    Vitamin D deficiency          no

## 2023-10-03 DIAGNOSIS — F41.9 ANXIETY: ICD-10-CM

## 2023-10-03 NOTE — TELEPHONE ENCOUNTER
"Felicia calling, she was told by her pharmacy to have her venlafaxine (EFFEXOR XR) 37.5 MG 24 hr capsule refill for November sent with November start date as they are having a hard time getting it in stock and if they have it on file they can fill it as soon as insurance allows vs waiting for prescriber response.     She would also like to try Clonidine as suggested she could do in her last visit: \"In 1 month if not improved, we can consider increasing effexor dose, or adding in a nighttime dose of Clonidine.\"    "

## 2023-10-09 RX ORDER — VENLAFAXINE HYDROCHLORIDE 37.5 MG/1
37.5 CAPSULE, EXTENDED RELEASE ORAL DAILY
Qty: 30 CAPSULE | Refills: 1 | OUTPATIENT
Start: 2023-10-09

## 2023-10-16 DIAGNOSIS — L50.9 URTICARIA: ICD-10-CM

## 2023-10-17 RX ORDER — HYDROXYZINE HYDROCHLORIDE 25 MG/1
25 TABLET, FILM COATED ORAL EVERY 4 HOURS PRN
Qty: 30 TABLET | Refills: 1 | Status: SHIPPED | OUTPATIENT
Start: 2023-10-17 | End: 2023-12-04

## 2023-10-26 DIAGNOSIS — F41.9 ANXIETY: ICD-10-CM

## 2023-10-26 RX ORDER — VENLAFAXINE HYDROCHLORIDE 37.5 MG/1
37.5 CAPSULE, EXTENDED RELEASE ORAL DAILY
Qty: 90 CAPSULE | Refills: 1 | Status: SHIPPED | OUTPATIENT
Start: 2023-10-26 | End: 2023-12-04

## 2023-11-02 ASSESSMENT — ANXIETY QUESTIONNAIRES
3. WORRYING TOO MUCH ABOUT DIFFERENT THINGS: NOT AT ALL
4. TROUBLE RELAXING: SEVERAL DAYS
GAD7 TOTAL SCORE: 1
5. BEING SO RESTLESS THAT IT IS HARD TO SIT STILL: NOT AT ALL
2. NOT BEING ABLE TO STOP OR CONTROL WORRYING: NOT AT ALL
6. BECOMING EASILY ANNOYED OR IRRITABLE: NOT AT ALL
IF YOU CHECKED OFF ANY PROBLEMS ON THIS QUESTIONNAIRE, HOW DIFFICULT HAVE THESE PROBLEMS MADE IT FOR YOU TO DO YOUR WORK, TAKE CARE OF THINGS AT HOME, OR GET ALONG WITH OTHER PEOPLE: SOMEWHAT DIFFICULT
1. FEELING NERVOUS, ANXIOUS, OR ON EDGE: NOT AT ALL
GAD7 TOTAL SCORE: 1
7. FEELING AFRAID AS IF SOMETHING AWFUL MIGHT HAPPEN: NOT AT ALL

## 2023-11-03 ENCOUNTER — OFFICE VISIT (OUTPATIENT)
Dept: PEDIATRICS | Facility: CLINIC | Age: 48
End: 2023-11-03
Attending: INTERNAL MEDICINE
Payer: COMMERCIAL

## 2023-11-03 VITALS
RESPIRATION RATE: 16 BRPM | BODY MASS INDEX: 27.96 KG/M2 | TEMPERATURE: 98 F | HEIGHT: 63 IN | SYSTOLIC BLOOD PRESSURE: 123 MMHG | HEART RATE: 81 BPM | DIASTOLIC BLOOD PRESSURE: 79 MMHG | WEIGHT: 157.8 LBS | OXYGEN SATURATION: 100 %

## 2023-11-03 DIAGNOSIS — G43.909 MIGRAINE WITHOUT STATUS MIGRAINOSUS, NOT INTRACTABLE, UNSPECIFIED MIGRAINE TYPE: Primary | ICD-10-CM

## 2023-11-03 DIAGNOSIS — F41.9 ANXIETY: ICD-10-CM

## 2023-11-03 PROCEDURE — 99213 OFFICE O/P EST LOW 20 MIN: CPT | Performed by: INTERNAL MEDICINE

## 2023-11-03 ASSESSMENT — PAIN SCALES - GENERAL: PAINLEVEL: NO PAIN (0)

## 2023-11-03 NOTE — PATIENT INSTRUCTIONS
Complete physcial exam in 6 months.    No changes in the effexor for now.    We can consdier a dose of the clonidine if needed later on for sleeping issues.

## 2023-11-03 NOTE — PROGRESS NOTES
"  Assessment & Plan     Anxiety  Much improved on effexor.  Congratulated.  Migraines are gone and is now sleeping 4 hours per night.    Follow up in 6 months for complete physcial exam.    Patient Instructions   Complete physcial exam in 6 months.    No changes in the effexor for now.    We can consdier a dose of the clonidine if needed later on for sleeping issues.                  BMI:   Estimated body mass index is 27.95 kg/m  as calculated from the following:    Height as of this encounter: 1.6 m (5' 3\").    Weight as of this encounter: 71.6 kg (157 lb 12.8 oz).   Weight management plan: Patient was referred to their PCP to discuss a diet and exercise plan.    See Patient Instructions    Jose Daniel Arredondo MD  [facility] JOSE Duarte is a 48 year old, presenting for the following health issues:  Recheck Medication (venlafaxine (EFFEXOR XR) 37.5 MG 24 hr capsule)        11/3/2023     3:15 PM   Additional Questions   Roomed by JAYANT Villa   Accompanied by ERIKA         11/3/2023     3:15 PM   Patient Reported Additional Medications   Patient reports taking the following new medications venlafaxine (EFFEXOR XR) 37.5 MG 24 hr capsule       History of Present Illness       Mental Health Follow-up:  Patient presents to follow-up on Anxiety.    Patient's anxiety since last visit has been:  Better  The patient is not having other symptoms associated with anxiety.  Any significant life events: No  Patient is not feeling anxious or having panic attacks.  Patient has no concerns about alcohol or drug use.        Feels much better  no the effexor.  Less anxiety going to bed.  Body aches gone.  No significant side effects other than initial stomach issues.  Took nexium over-the-counter.      Took hydroxyzine over-the-counter; had some itching and hives.      Migraines are better as well.    Not seeing a counselor due to money.     Still gets on ly 4 hours of sleep per night.               Review of " "Systems   CONSTITUTIONAL: NEGATIVE for fever, chills, change in weight  INTEGUMENTARY/SKIN: NEGATIVE for worrisome rashes, moles or lesions  EYES: NEGATIVE for vision changes or irritation  ENT/MOUTH: NEGATIVE for ear, mouth and throat problems  RESP: NEGATIVE for significant cough or SOB  BREAST: NEGATIVE for masses, tenderness or discharge  CV: NEGATIVE for chest pain, palpitations or peripheral edema  GI: NEGATIVE for nausea, abdominal pain, heartburn, or change in bowel habits  : NEGATIVE for frequency, dysuria, or hematuria  MUSCULOSKELETAL: NEGATIVE for significant arthralgias or myalgia  NEURO: NEGATIVE for weakness, dizziness or paresthesias  ENDOCRINE: NEGATIVE for temperature intolerance, skin/hair changes  HEME: NEGATIVE for bleeding problems  PSYCHIATRIC: NEGATIVE for changes in mood or affect      Objective    /79   Pulse 81   Temp 98  F (36.7  C) (Oral)   Resp 16   Ht 1.6 m (5' 3\")   Wt 71.6 kg (157 lb 12.8 oz)   LMP 10/19/2023 (Exact Date)   SpO2 100%   BMI 27.95 kg/m    Body mass index is 27.95 kg/m .  Physical Exam   GENERAL: healthy, alert and no distress  SKIN: no suspicious lesions or rashes  NEURO: Normal strength and tone, mentation intact and speech normal  PSYCH: mentation appears normal, affect normal/bright                      "

## 2023-12-01 ENCOUNTER — OFFICE VISIT (OUTPATIENT)
Dept: URGENT CARE | Facility: URGENT CARE | Age: 48
End: 2023-12-01
Payer: COMMERCIAL

## 2023-12-01 VITALS
SYSTOLIC BLOOD PRESSURE: 112 MMHG | DIASTOLIC BLOOD PRESSURE: 72 MMHG | TEMPERATURE: 98.4 F | WEIGHT: 160 LBS | RESPIRATION RATE: 16 BRPM | BODY MASS INDEX: 28.34 KG/M2 | OXYGEN SATURATION: 100 % | HEART RATE: 67 BPM

## 2023-12-01 DIAGNOSIS — L29.9 GENERALIZED PRURITUS: ICD-10-CM

## 2023-12-01 DIAGNOSIS — R09.81 NASAL CONGESTION: Primary | ICD-10-CM

## 2023-12-01 DIAGNOSIS — L01.00 IMPETIGO: ICD-10-CM

## 2023-12-01 PROCEDURE — 99214 OFFICE O/P EST MOD 30 MIN: CPT | Performed by: FAMILY MEDICINE

## 2023-12-01 RX ORDER — MUPIROCIN 20 MG/G
OINTMENT TOPICAL 3 TIMES DAILY
Qty: 30 G | Refills: 0 | Status: SHIPPED | OUTPATIENT
Start: 2023-12-01 | End: 2023-12-04

## 2023-12-01 RX ORDER — OXYMETAZOLINE HYDROCHLORIDE 0.05 G/100ML
2 SPRAY NASAL 2 TIMES DAILY
Qty: 37 ML | Refills: 0 | Status: SHIPPED | OUTPATIENT
Start: 2023-12-01

## 2023-12-01 ASSESSMENT — ENCOUNTER SYMPTOMS
SHORTNESS OF BREATH: 0
FEVER: 0

## 2023-12-02 ENCOUNTER — NURSE TRIAGE (OUTPATIENT)
Dept: NURSING | Facility: CLINIC | Age: 48
End: 2023-12-02
Payer: COMMERCIAL

## 2023-12-02 NOTE — TELEPHONE ENCOUNTER
Nurse Triage SBAR    Is this a 2nd Level Triage? YES, LICENSED PRACTITIONER REVIEW IS REQUIRED    Situation:   Pt is calling about facial rash    Background:   Pt was seen in  yesterday, was prescribed Bactroban for rash    Assessment:   Rash seems to be getting bigger. Blisters. Itchy. Burns when medication is applied.  Denies fever, SOB    Protocol Recommended Disposition:   See PCP Within 24 Hours    Recommendation:   Pt is requesting for change in medication     Paged to provider Cosme Almanzar, recommends for pt to stop medication, OTC bacitracin/neosporin. Avoid open areas/sores. Follow up on Monday if symptoms does not improve or same.    Does the patient meet one of the following criteria for ADS visit consideration? 16+ years old, with an [facility] PCP     TIP  Providers, please consider if this condition is appropriate for management at one of our Acute and Diagnostic Services sites.     If patient is a good candidate, please use dotphrase <dot>triageresponse and select Refer to ADS to document.    Provider Recommendation Follow Up:   Reached patient/caregiver. Informed of provider's recommendations. Patient verbalized understanding and agrees with the plan.     Charles Acosta, RN, BSN  12/2/2023 at 4:50 PM  [facility]        Reason for Disposition   [1] Caller has URGENT question (includes prescribed medication questions) AND [2] triager unable to answer question    Additional Information   Negative: Shock suspected (e.g., cold/pale/clammy skin, too weak to stand, low BP, rapid pulse)   Negative: Difficult to awaken or acting confused (e.g., disoriented, slurred speech)   Negative: Sounds like a life-threatening emergency to the triager   Negative: [1] Drinking very little AND [2] dehydration suspected (e.g., no urine > 12 hours, very dry mouth, very lightheaded)   Negative: Patient sounds very sick or weak to the triager   Negative: Fever > 104 F (40 C)   Negative: [1] SEVERE pain (e.g.,  excruciating, pain scale 8-10) AND [2] not improved after pain medications   Negative: [1] Recent medical visit within 24 hours AND [2] condition / symptoms WORSE   Negative: [1] Recent medical visit within 24 hours AND [2] NEW symptom AND [3] that could be serious    Protocols used: Recent Medical Visit for Illness Follow-up Call-A-AH

## 2023-12-02 NOTE — PROGRESS NOTES
Assessment & Plan     Nasal congestion  Previously seen allergist, no signs for infection, this is a chronic issue, recommend trial of Afrin.  Follow-up with ENT if not better.  - oxymetazoline (AFRIN) 0.05 % nasal spray  Dispense: 37 mL; Refill: 0    Impetigo  Patient started scratching her face after starting Effexor, looks like she has impetigo on her face, start topical mupirocin  - mupirocin (BACTROBAN) 2 % external ointment  Dispense: 30 g; Refill: 0    Generalized pruritus  Patient has developed generalized pruritus after starting Effexor which is likely due to the adrenergic component of the SNRI.  Commend she follows up with her primary care provider for consideration of switching to alternatives including Lexapro.  Continue hydroxyzine as needed otherwise mental health medication follow-up per PCP.                   Return in about 1 week (around 12/8/2023) for If symptoms do not improve or gets worse..    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  The itching on her face with red bumps started after starting Effexor.  Congestion in her sinuses started after she emigrated from [place].    No facial pain difficulty breathing or fever.            Review of Systems   Constitutional:  Negative for fever.   Respiratory:  Negative for shortness of breath.    Cardiovascular:  Negative for chest pain.   Skin:  Positive for rash.            Objective    /72   Pulse 67   Temp 98.4  F (36.9  C)   Resp 16   Wt 72.6 kg (160 lb)   LMP  10/19/2023 (Exact Date)   SpO2 100%   BMI 28.34 kg/m    Body mass index is 28.34 kg/m .  Physical Exam  Vitals reviewed.   Constitutional:       Appearance: She is not ill-appearing.   HENT:      Head:      Comments: Erythematous papule that with yellow crusted weeping on her facial cheek     Nose: Congestion and rhinorrhea present.   Cardiovascular:      Rate and Rhythm: Normal rate and regular rhythm.   Pulmonary:      Effort: Pulmonary effort is normal.      Breath sounds: Normal breath sounds.

## 2023-12-04 ENCOUNTER — TELEPHONE (OUTPATIENT)
Dept: PEDIATRICS | Facility: CLINIC | Age: 48
End: 2023-12-04

## 2023-12-04 ENCOUNTER — OFFICE VISIT (OUTPATIENT)
Dept: INTERNAL MEDICINE | Facility: CLINIC | Age: 48
End: 2023-12-04
Payer: COMMERCIAL

## 2023-12-04 VITALS
OXYGEN SATURATION: 98 % | RESPIRATION RATE: 18 BRPM | HEART RATE: 71 BPM | TEMPERATURE: 97.9 F | DIASTOLIC BLOOD PRESSURE: 67 MMHG | WEIGHT: 159 LBS | SYSTOLIC BLOOD PRESSURE: 122 MMHG | BODY MASS INDEX: 31.22 KG/M2 | HEIGHT: 60 IN

## 2023-12-04 DIAGNOSIS — L01.00 IMPETIGO: ICD-10-CM

## 2023-12-04 DIAGNOSIS — F41.9 ANXIETY: ICD-10-CM

## 2023-12-04 DIAGNOSIS — J01.90 ACUTE SINUSITIS WITH SYMPTOMS > 10 DAYS: Primary | ICD-10-CM

## 2023-12-04 PROCEDURE — 99214 OFFICE O/P EST MOD 30 MIN: CPT | Performed by: INTERNAL MEDICINE

## 2023-12-04 RX ORDER — ESCITALOPRAM OXALATE 10 MG/1
10 TABLET ORAL DAILY
Qty: 30 TABLET | Refills: 1 | Status: SHIPPED | OUTPATIENT
Start: 2023-12-04 | End: 2024-01-04

## 2023-12-04 RX ORDER — CEPHALEXIN 500 MG/1
500 CAPSULE ORAL 2 TIMES DAILY
Qty: 14 CAPSULE | Refills: 0 | Status: SHIPPED | OUTPATIENT
Start: 2023-12-04 | End: 2023-12-18

## 2023-12-04 ASSESSMENT — ENCOUNTER SYMPTOMS
COUGH: 1
FATIGUE: 1
SINUS PRESSURE: 1
NEUROLOGICAL NEGATIVE: 1
CARDIOVASCULAR NEGATIVE: 1
SINUS PAIN: 1
MUSCULOSKELETAL NEGATIVE: 1
GASTROINTESTINAL NEGATIVE: 1
RHINORRHEA: 1
FEVER: 1

## 2023-12-04 NOTE — TELEPHONE ENCOUNTER
Call to patient to relay provider message.    Thank you,  Tevin Gutierrez, Triage RN [facility]  2:40 PM 12/4/2023

## 2023-12-04 NOTE — TELEPHONE ENCOUNTER
Patient forgot to ask provider if facial lesion is contagious during her appointment with Dr. Chun today. Patient has dental appointment today this evening and wants to know if she should cancel this appointment.    Thank you,  Tevin Gutierrez, Triage RN [facility]  11:42 AM 12/4/2023

## 2023-12-04 NOTE — PROGRESS NOTES
Assessment & Plan     Acute sinusitis with symptoms > 10 days  Given the duration of her sinus symptoms, I do think it is worthwhile to proceed with a course of cephalexin 500 mg per mouth twice per day for 7 days to see if this will help resolve her symptoms.  Patient did want to try an antibiotic that would cover both her sinus issues as well as her impetigo, therefore we did elect to proceed with cephalexin.  Side effects of medication were reviewed.  We did discuss use of nasal decongestants and antihistamines for management of her nasal secretions.  - cephALEXin (KEFLEX) 500 MG capsule; Take 1 capsule (500 mg) by mouth 2 times daily    Impetigo  We will see if the patient will have resolution of her impetigo with the use of cephalexin 500 mg p.o. twice per day for 7 days.  Patient no further questions or concerns in this regard.  - cephALEXin (KEFLEX) 500 MG capsule; Take 1 capsule (500 mg) by mouth 2 times daily    Anxiety  Patient did have some improvement in her anxiety symptoms with use of Effexor, but unfortunate she does have issues with side effects related to the medication.  After much discussion, we did elect to proceed with Lexapro 10 mg per mouth per day.  Side effects of SSRI use were reviewed.  Patient will follow-up in approximately 4 to 6 weeks for repeat assessment of her mood and response to medication.  She is aware that should she have any issues with her mood or difficulties with medication that she should contact the clinic for additional assistance.  - escitalopram (LEXAPRO) 10 MG tablet; Take 1 tablet (10 mg) by mouth daily    Prescription drug management  30 minutes spent by me on the date of the encounter doing chart review, history and exam, documentation and further activities per the note     MED REC REQUIRED  Post Medication Reconciliation Status:  Discharge medications reconciled and changed, see notes/orders  See Patient Instructions    Larry Chun MD  [facility]  [facility] [place]    Subjective   Felicia is a 48 year old, presenting for the following health issues:  Follow Up ( follow up)      Patient is a 48-year-old female who presents to the clinic for an urgent care clinic follow-up visit.  She was seen at an outside urgent care clinic on December 1 for sinus symptoms as well as a rash on her face.  Patient has also been dealing with some generalized itching that began after starting Effexor 3 months ago.  Patient was told that she likely has an allergy to Effexor as she has had difficulties tolerating multiple medications.  The Effexor was discontinued, and her itching subsequently resolved.  In regards to the rash on her face, patient was diagnosed with impetigo.  She was placed on mupirocin ointment at the urgent care visit, but unfortunately this did cause an exacerbation of the rash.  Patient is wondering what other options are available for treatment.  She is continue to have issues with sinus pressure, congestion, rhinorrhea, intermittent cough, and subjective fevers.  Patient was instructed on supportive care only for her sinus symptoms at the time of her recent urgent care visit.  She is wondering if there is an antibiotic that can help take care of of the impetigo as well as her sinus symptoms.  Patient is very concerned about medications due to her extensive allergy list.       ED/UC Followup:    Facility:  [facility]  Date of visit: 12/1/23  Reason for visit: Rash  Current Status: stable      Review of Systems   Constitutional:  Positive for fatigue and fever.   HENT:  Positive for congestion, rhinorrhea, sinus pressure and sinus pain.    Respiratory:  Positive for cough.    Cardiovascular: Negative.    Gastrointestinal: Negative.    Genitourinary: Negative.    Musculoskeletal: Negative.    Skin:  Positive for rash.   Neurological: Negative.           Objective    /67   Pulse 71   Temp 97.9  F (36.6  C)   Resp 18   Ht 1.524 m (5')   Wt  72.1 kg (159 lb)   LMP 10/19/2023 (Exact Date)   SpO2 98%   Breastfeeding No   BMI 31.05 kg/m    Body mass index is 31.05 kg/m .  Physical Exam  Vitals reviewed.   HENT:      Head: Normocephalic and atraumatic.      Right Ear: Tympanic membrane, ear canal and external ear normal.      Left Ear: Tympanic membrane, ear canal and external ear normal.      Nose: Congestion and rhinorrhea present.   Eyes:      Extraocular Movements: Extraocular movements intact.      Conjunctiva/sclera: Conjunctivae normal.      Pupils: Pupils are equal, round, and reactive to light.   Cardiovascular:      Rate and Rhythm: Normal rate and regular rhythm.      Pulses: Normal pulses.      Heart sounds: Normal heart sounds.   Pulmonary:      Breath sounds: Normal breath sounds.   Abdominal:      General: Bowel sounds are normal.      Palpations: Abdomen is soft.   Musculoskeletal:      Cervical back: Normal range of motion and neck supple.   Skin:     Capillary Refill: Capillary refill takes less than 2 seconds.      Findings: Rash (Erythematous rash with alcazar scale noted on her right cheek.) present.   Neurological:      Mental Status: She is alert.

## 2023-12-18 NOTE — PROGRESS NOTES
SUBJECTIVE:                                                   Felicia Bermudez is a 48 year old female who presents to clinic today for the following health issue(s):  Patient presents with:  Consult: Surgical consult for hysterectomy      Additional information: Seeking surgical management    HPI:  Felicia is a 49 yo  with a long history of abnormal uterine bleeding. Her menses are now irregular and heavy and painful. In the month of September she states that she had bleeding three separate times. She has used oral contraceptive pills with no improvement in the past. She had the Mirena IUD and had ovarian cysts and continued to have painful periods. She used the Nexplanon and developed painful cysts in her breast. She also has significant dyspareunia that is limited to her right side. Her ultrasound has not shown uterine fibroids. She had a laparoscopy several years ago with no endometriosis diagnosed. She did have intra-abdominal scarring at the time. She has also had positive HPV on her prior pap smear in the past. She has also attempted to manage her bleeding with HRT with no improvement.     Patient's last menstrual period was 2023 (exact date)..     Patient is sexually active, .  Using tubal ligation for contraception.    reports that she has never smoked. She has never used smokeless tobacco.  STD testing offered?  Declined    Health maintenance reviewed:  yes    Overdue          Never done ADVANCE CARE PLANNING (Every 5 Years)     Never done HEPATITIS B IMMUNIZATION (1 of 3 - 3-dose series)     2020 YEARLY PREVENTIVE VISIT (Yearly)  Last completed: 2019     SEP 1   2023 INFLUENZA VACCINE (1)  Last completed: 2019     SEP 1   2023 COVID-19 Vaccine ( season)  Last completed: Dec 15, 2021       Today's PHQ-2 Score:       2023     3:09 PM   PHQ-2 (  [employer])   Q1: Little interest or pleasure in doing things 1   Q2: Feeling down, depressed or hopeless 0    PHQ-2 Score 1   Q1: Little interest or pleasure in doing things Several days   Q2: Feeling down, depressed or hopeless Not at all   PHQ-2 Score 1     Today's PHQ-9 Score:       8/29/2023     3:07 PM   PHQ-9 SCORE   PHQ-9 Total Score MyChart 10 (Moderate depression)   PHQ-9 Total Score 10     Today's MALLORIE-7 Score:       11/2/2023     5:11 PM   MALLORIE-7 SCORE   Total Score 1 (minimal anxiety)   Total Score 1       Problem list and histories reviewed & adjusted, as indicated.  Additional history: as documented.    Patient Active Problem List   Diagnosis    Urticaria    Multiple food allergies    PUD (peptic ulcer disease)    High risk HPV infection    Psychophysiological insomnia    Menorrhagia    Anxiety    Vitamin D deficiency    Migraine without status migrainosus, not intractable    Chronic fatigue syndrome    Menopausal and postmenopausal disorder    Iron deficiency anemia     Past Surgical History:   Procedure Laterality Date    COLONOSCOPY N/A 5/1/2023    Procedure: Colonoscopy;  Surgeon: Page Neal MD;  Location:  GI    DILATION AND CURETTAGE, HYSTEROSCOPY, ABLATE ENDOMETRIUM NOVASURE, COMBINED N/A 3/16/2018    Procedure: COMBINED DILATION AND CURETTAGE, HYSTEROSCOPY, ABLATE ENDOMETRIUM NOVASURE;  Hysteroscopy, Dilation and Curettage, unable to perform Endometrial Ablation with Novasure secondary to uterine perforation, diagnostic laparoscopy, Bilateral Laparoscopic Tubal Ligation ;  Surgeon: Danilo White MD;  Location: [facility] OR    EYE SURGERY      REmoval of mass left eye    LAPAROSCOPIC TUBAL LIGATION Bilateral 3/16/2018    Procedure: LAPAROSCOPIC TUBAL LIGATION;;  Surgeon: Danilo White MD;  Location: [facility] OR    LAPAROSCOPY DIAGNOSTIC (GYN)      SURGICAL HISTORY OF -       2010 turbinate surgery      Social History     Tobacco Use    Smoking status: Never    Smokeless tobacco: Never   Substance Use Topics    Alcohol use: No     Alcohol/week: 0.0 standard drinks of alcohol      Problem (#  "of Occurrences) Relation (Name,Age of Onset)    Diabetes (2) Father, Paternal Grandmother    Hypertension (1) Father    Neurologic Disorder (1) Mother (57): brain anuerysm    Polycythemia (1) Daughter    Other Cancer (1) Father (70): Testicle Cancer    Other - See Comments (1) Daughter (21): rare blood disorders              Current Outpatient Medications   Medication Sig    escitalopram (LEXAPRO) 10 MG tablet Take 1 tablet (10 mg) by mouth daily    hydrOXYzine HCl (ATARAX) 25 MG tablet Take 25 mg by mouth 3 times daily as needed for itching    oxymetazoline (AFRIN) 0.05 % nasal spray Spray 2 sprays into both nostrils 2 times daily     No current facility-administered medications for this visit.     Allergies   Allergen Reactions    Bactrim [Sulfamethoxazole-Trimethoprim] Swelling     Tongue swelling    Cucumber Extract Shortness Of Breath and Other (See Comments)     Tongue swelling    Effexor [Venlafaxine] Hives, Rash and Blisters    Amoxicillin Swelling     Tongue swelling    Doxycycline Hives and Itching    Iodine Hives    Latex Hives     Lip swelling    Sulfa Antibiotics Hives    Watermelon [Citrullus Vulgaris] Hives     itching    Zolpidem Unknown    Zolpidem Tartrate Swelling     Tongue swelling    Amoxicillin-Pot Clavulanate Rash     Tongue swelling    Eszopiclone Swelling and Rash     Tongue swelling    Iodinated Contrast Media Rash       ROS:  12 point review of systems negative other than symptoms noted below or in the HPI.  No urinary frequency or dysuria, bladder or kidney problems      OBJECTIVE:     /64 (BP Location: Right arm)   Ht 1.594 m (5' 2.75\")   Wt 72.1 kg (159 lb)   LMP 12/04/2023 (Exact Date)   BMI 28.39 kg/m    Body mass index is 28.39 kg/m .    Exam:  Constitutional:  Appearance: Well nourished, well developed alert, in no acute distress  Gastrointestinal:  Abdominal Examination:  Abdomen nontender to palpation, tone normal without rigidity or guarding, no masses present, " umbilicus without lesions; Liver/Spleen:  No hepatomegaly present, liver nontender to palpation; Hernias:  No hernias present  Lymphatic: Lymph Nodes:  No other lymphadenopathy present  Skin: General Inspection:  No rashes present, no lesions present, no areas of discoloration.  Neurologic:  Mental Status:  Oriented X3.  Normal strength and tone, sensory exam grossly normal, mentation intact and speech normal.    Psychiatric:  Mentation appears normal and affect normal/bright.  Pelvic Exam:  External Genitalia:     Normal appearance for age, no discharge present, no tenderness present, no inflammatory lesions present, color normal  Vagina:     Normal vaginal vault without central or paravaginal defects, no discharge present, no inflammatory lesions present, no masses present  Bladder:     Nontender to palpation  Urethra:   Urethral Body:  Urethra palpation normal, urethra structural support normal   Urethral Meatus:  No erythema or lesions present  Cervix:     Appearance healthy, no lesions present, nontender to palpation, no bleeding present  Uterus:     Uterus: firm, normal sized and nontender, midplane in position. Not retroverted on palpation today.  Adnexa:     No adnexal tenderness present, no adnexal masses present  Perineum:     Perineum within normal limits, no evidence of trauma, no rashes or skin lesions present  Anus:     Anus within normal limits, no hemorrhoids present  Inguinal Lymph Nodes:     No lymphadenopathy present  Pubic Hair:     Normal pubic hair distribution for age  Genitalia and Groin:     No rashes present, no lesions present, no areas of discoloration, no masses present     Endometrial Biopsy: After obtaining verbal consent the cervix was cleansed with betadine. The endometrial pipelle was inserted and the uterus sounded to 7 cm. A moderate amount of tissue was removed, labeled and sent to pathology. No tenaculum was used. Felicia tolerated the procedure well.    ASSESSMENT/PLAN:                                                         ICD-10-CM    1. Abnormal uterine bleeding  N93.9 Surgical Pathology Exam     ENDOMETRIAL BIOPSY W/O CERVICAL DILATION        Felicia has used multiple forms of medical management of her abnormal uterine bleeding with no improvement. Her menses are now irregular and notably getting worse in terms of pain and the amount of bleeding. Given her abnormal uterine bleeding and her age an endometrial biopsy was done today. We discussed different forms of hysterectomy, namely total versus subtotal hysterectomy. She has had a history of HPV in the past and is worried about having to have future procedures on her cervix. She also has had significant dyspareunia and would be looking to have relief from a hysterectomy. I recommend a laparoscopic assisted vaginal hysterectomy. The ovaries will be left in situ due to her age. I discussed the risks of the procedure including but not limited to ureteral or bladder injury and the need for a diagnostic cystoscopy. We will move forward with scheduling per her convenience.     Shelly Diaz MD  [facility] [place]

## 2023-12-19 ENCOUNTER — OFFICE VISIT (OUTPATIENT)
Dept: OBGYN | Facility: CLINIC | Age: 48
End: 2023-12-19
Payer: COMMERCIAL

## 2023-12-19 ENCOUNTER — PREP FOR PROCEDURE (OUTPATIENT)
Dept: OBGYN | Facility: CLINIC | Age: 48
End: 2023-12-19

## 2023-12-19 VITALS
WEIGHT: 159 LBS | HEIGHT: 63 IN | SYSTOLIC BLOOD PRESSURE: 124 MMHG | BODY MASS INDEX: 28.17 KG/M2 | DIASTOLIC BLOOD PRESSURE: 64 MMHG

## 2023-12-19 DIAGNOSIS — N93.9 ABNORMAL UTERINE BLEEDING: Primary | ICD-10-CM

## 2023-12-19 DIAGNOSIS — N93.9 ABNORMAL UTERINE BLEEDING (AUB): Primary | ICD-10-CM

## 2023-12-19 PROCEDURE — 99214 OFFICE O/P EST MOD 30 MIN: CPT | Mod: 25 | Performed by: OBSTETRICS & GYNECOLOGY

## 2023-12-19 PROCEDURE — 88305 TISSUE EXAM BY PATHOLOGIST: CPT | Performed by: PATHOLOGY

## 2023-12-19 PROCEDURE — 58100 BIOPSY OF UTERUS LINING: CPT | Performed by: OBSTETRICS & GYNECOLOGY

## 2023-12-19 RX ORDER — HYDROXYZINE HYDROCHLORIDE 25 MG/1
25 TABLET, FILM COATED ORAL 3 TIMES DAILY PRN
COMMUNITY
End: 2024-02-12

## 2023-12-20 ENCOUNTER — TELEPHONE (OUTPATIENT)
Dept: OBGYN | Facility: CLINIC | Age: 48
End: 2023-12-20

## 2023-12-20 NOTE — TELEPHONE ENCOUNTER
ERAS BAG GIVEN Yes  ERAS INSTRUCTIONS EXPLAINED Yes    ASSIST: Dr. Terese Lang    H&P TO BE COMPLETED BY:   PCP  FOR H&P TO BE DONE BY SURGEON   PCP  SAME DAY/OBSERVATION/INPATIENT: OBSERVATION  EQUIPMENT:   VENDOR NEEDED AT CASE:   IF IUD WHAT BRAND   LABS/SPECIAL INSTRUCTIONS:   TIME OFF WORK: 3 weeks per patient  ESTIMATED DOCTOR TIME NEEDED IN MINUTES 120  POST OP TO BE SCHEDULED AT 6 WEEKS AFTER SX APPOINTMENT LENGTH IN MINUTES 15

## 2023-12-21 LAB
PATH REPORT.COMMENTS IMP SPEC: NORMAL
PATH REPORT.COMMENTS IMP SPEC: NORMAL
PATH REPORT.FINAL DX SPEC: NORMAL
PATH REPORT.GROSS SPEC: NORMAL
PATH REPORT.MICROSCOPIC SPEC OTHER STN: NORMAL
PATH REPORT.RELEVANT HX SPEC: NORMAL
PHOTO IMAGE: NORMAL

## 2023-12-21 NOTE — TELEPHONE ENCOUNTER
Type of surgery: LAVH BS CYSTO FEMALE GENITAL INFIBULATION TAKE DOWN  Location of surgery: [facility] OR  Date and time of surgery: 3/13/2024 12:20p  Surgeon: AHKAN Harden  Pre-Op Appt Date: PCP Lv PAUL  Post-Op Appt Date: 4/23/2024 1:30p   Packet sent out:  MAILED 12/21/2023  Pre-cert/Authorization completed:   TBD  Date: 12/21/2023 Frank Palmer  Surgery Scheduler    [facility] [id number]   [id number]

## 2023-12-26 ENCOUNTER — TELEPHONE (OUTPATIENT)
Dept: PEDIATRICS | Facility: CLINIC | Age: 48
End: 2023-12-26
Payer: COMMERCIAL

## 2023-12-26 DIAGNOSIS — F41.9 ANXIETY: Primary | ICD-10-CM

## 2023-12-26 NOTE — TELEPHONE ENCOUNTER
Patient seen 12/4/2023 by KHADIJAH Chun. Started Lexapro as had side effects from Effexor.    C/o severe constipation. Now has some rectal bleeding from pushing hard stool.    Tried fiber, MOM, dulcolax, and miralax.    Routed to provider for advise.    Taylor Garcia RN

## 2023-12-27 NOTE — TELEPHONE ENCOUNTER
Pt calls back. She will stop the medication. She will call back once her symptoms improve, or call back sooner if not improving.

## 2023-12-27 NOTE — TELEPHONE ENCOUNTER
I would recommend that she hold the Lexapro for the time being to see if her gastrointestinal symptoms improve before starting any new medications.

## 2024-01-02 NOTE — TELEPHONE ENCOUNTER
Patient calls back with update. Constipation has improved since stopping Lexapro and asking for alternative medication.     Kianna Skelton RN  [facility]

## 2024-01-04 RX ORDER — FLUOXETINE 10 MG/1
10 CAPSULE ORAL DAILY
Qty: 30 CAPSULE | Refills: 1 | Status: SHIPPED | OUTPATIENT
Start: 2024-01-04 | End: 2024-02-12

## 2024-01-04 NOTE — TELEPHONE ENCOUNTER
I will submit a prescription for fluoxetine 10 mg by mouth per day at this time.  I would recommend follow-up assessment in approximately 4 to 6 weeks.

## 2024-02-06 ENCOUNTER — TELEPHONE (OUTPATIENT)
Dept: INTERNAL MEDICINE | Facility: CLINIC | Age: 49
End: 2024-02-06

## 2024-02-06 NOTE — TELEPHONE ENCOUNTER
Patient is calling because she took one month of prozac that she finished 2/2/24 but it had too many side effects so she did not get a refill. She wants to know if it is ok that she stopped it and will not see you until 2/12/24..

## 2024-02-07 NOTE — TELEPHONE ENCOUNTER
Given the short duration on the medication, it is fine that she stop taking it.  We can address other medication options if she would like at her upcoming visit.

## 2024-02-12 ENCOUNTER — OFFICE VISIT (OUTPATIENT)
Dept: INTERNAL MEDICINE | Facility: CLINIC | Age: 49
End: 2024-02-12
Payer: COMMERCIAL

## 2024-02-12 VITALS
RESPIRATION RATE: 18 BRPM | HEART RATE: 78 BPM | WEIGHT: 161.9 LBS | DIASTOLIC BLOOD PRESSURE: 57 MMHG | TEMPERATURE: 99.1 F | OXYGEN SATURATION: 100 % | SYSTOLIC BLOOD PRESSURE: 155 MMHG | BODY MASS INDEX: 28.91 KG/M2

## 2024-02-12 DIAGNOSIS — F41.9 ANXIETY: Primary | ICD-10-CM

## 2024-02-12 PROCEDURE — 99214 OFFICE O/P EST MOD 30 MIN: CPT | Performed by: INTERNAL MEDICINE

## 2024-02-12 RX ORDER — ALPRAZOLAM 0.5 MG
0.5 TABLET ORAL 2 TIMES DAILY PRN
Qty: 30 TABLET | Refills: 0 | Status: SHIPPED | OUTPATIENT
Start: 2024-02-12 | End: 2024-04-09

## 2024-02-12 NOTE — PATIENT INSTRUCTIONS
Due to issues tolerating medication, we will proceed with a trial of as needed alprazolam for her anxiety.

## 2024-02-12 NOTE — PROGRESS NOTES
Assessment & Plan     Anxiety  At this time, patient continues to struggle with intermittent episodes of increased anxiety.  Unfortunate, she has had issues with tolerance or lack of response to several SSRIs as well as an SRI's.  After much discussion, we did elect to proceed with a trial of as needed anxiety medication only.  A prescription for alprazolam 0.5 mg by mouth twice per day as needed for anxiety will be submitted to her pharmacy.  Side effects of benzodiazepine use were reviewed.  We will monitor how she responds to this medication and her frequency of use.  - ALPRAZolam (XANAX) 0.5 MG tablet; Take 1 tablet (0.5 mg) by mouth 2 times daily as needed for anxiety    Prescription drug management  25 minutes spent by me on the date of the encounter doing chart review, history and exam, documentation and further activities per the note    See Patient Instructions    Georges Duarte is a 48 year old, presenting for the following health issues:  Medication Problem        2/12/2024     2:30 PM   Additional Questions   Roomed by azamit   Accompanied by self         2/12/2024     2:30 PM   Patient Reported Additional Medications   Patient reports taking the following new medications none     Patient is a 48-year-old female who presents to the clinic for a follow-up visit.  She has had longstanding issues with anxiety, and she has had some difficulties getting a medication that has been helpful for her.  Patient was previously on Effexor sometime ago, and it was effective for her mood.  Unfortunately, she did develop an allergy to that particular medication.  She has been tried on several medications including sertraline, Paxil, Cymbalta, escitalopram, and most recently fluoxetine.  Unfortunately, patient has had issues either tolerating the medications or if they were not helpful for her mood.  In regards to her recent use of fluoxetine, she states that it made her feel very groggy and as if she was  experiencing constant flu symptoms.  Patient does report continued issues with intermittent episodes of increased anxiety.  She is wondering what other options are available for her at this time.  He has tried the use of hydroxyzine in the past, but she feels that it was overly sedating.    History of Present Illness       Reason for visit:  Medication fillow up    She eats 4 or more servings of fruits and vegetables daily.She consumes 1 sweetened beverage(s) daily.She exercises with enough effort to increase her heart rate 9 or less minutes per day.  She exercises with enough effort to increase her heart rate 3 or less days per week.   She is taking medications regularly.       Review of Systems  CONSTITUTIONAL: NEGATIVE for fever, chills, change in weight  ENT/MOUTH: NEGATIVE for ear, mouth and throat problems  RESP: NEGATIVE for significant cough or SOB  CV: NEGATIVE for chest pain, palpitations or peripheral edema  GI: NEGATIVE for nausea, abdominal pain, heartburn, or change in bowel habits  : NEGATIVE for frequency, dysuria, or hematuria  MUSCULOSKELETAL: NEGATIVE for significant arthralgias or myalgia  NEURO: NEGATIVE for weakness, dizziness or paresthesias  PSYCHIATRIC: Positive for anxiety.      Objective    BP (!) 155/57 (BP Location: Right arm, Patient Position: Sitting, Cuff Size: Adult Regular)   Pulse 78   Temp 99.1  F (37.3  C) (Oral)   Resp 18   Wt 73.4 kg (161 lb 14.4 oz)   LMP 12/04/2023 (Exact Date)   SpO2 100%   BMI 28.91 kg/m    Body mass index is 28.91 kg/m .  Physical Exam  Vitals reviewed.   HENT:      Head: Normocephalic and atraumatic.      Mouth/Throat:      Mouth: Mucous membranes are moist.      Pharynx: Oropharynx is clear.   Eyes:      Extraocular Movements: Extraocular movements intact.      Conjunctiva/sclera: Conjunctivae normal.      Pupils: Pupils are equal, round, and reactive to light.   Cardiovascular:      Rate and Rhythm: Normal rate and regular rhythm.      Pulses:  Normal pulses.      Heart sounds: Normal heart sounds.   Pulmonary:      Effort: Pulmonary effort is normal.      Breath sounds: Normal breath sounds.   Skin:     Capillary Refill: Capillary refill takes less than 2 seconds.   Neurological:      General: No focal deficit present.      Mental Status: She is alert and oriented to person, place, and time.   Psychiatric:         Attention and Perception: Attention and perception normal.         Mood and Affect: Affect normal. Mood is anxious.         Speech: Speech normal.      Comments: No mental health score sheets completed today.        Signed Electronically by: Larry Chun MD

## 2024-02-20 ENCOUNTER — OFFICE VISIT (OUTPATIENT)
Dept: PEDIATRICS | Facility: CLINIC | Age: 49
End: 2024-02-20

## 2024-02-20 VITALS
OXYGEN SATURATION: 100 % | SYSTOLIC BLOOD PRESSURE: 117 MMHG | TEMPERATURE: 97.9 F | HEIGHT: 62 IN | HEART RATE: 66 BPM | RESPIRATION RATE: 16 BRPM | BODY MASS INDEX: 30.47 KG/M2 | WEIGHT: 165.6 LBS | DIASTOLIC BLOOD PRESSURE: 69 MMHG

## 2024-02-20 DIAGNOSIS — K27.9 PUD (PEPTIC ULCER DISEASE): ICD-10-CM

## 2024-02-20 DIAGNOSIS — D50.9 IRON DEFICIENCY ANEMIA, UNSPECIFIED IRON DEFICIENCY ANEMIA TYPE: ICD-10-CM

## 2024-02-20 DIAGNOSIS — G43.909 MIGRAINE WITHOUT STATUS MIGRAINOSUS, NOT INTRACTABLE, UNSPECIFIED MIGRAINE TYPE: ICD-10-CM

## 2024-02-20 DIAGNOSIS — N92.4 EXCESSIVE BLEEDING IN PREMENOPAUSAL PERIOD: ICD-10-CM

## 2024-02-20 DIAGNOSIS — F41.9 ANXIETY: ICD-10-CM

## 2024-02-20 DIAGNOSIS — G93.32 CHRONIC FATIGUE SYNDROME: ICD-10-CM

## 2024-02-20 DIAGNOSIS — Z01.818 PREOP GENERAL PHYSICAL EXAM: Primary | ICD-10-CM

## 2024-02-20 DIAGNOSIS — Z91.018 MULTIPLE FOOD ALLERGIES: ICD-10-CM

## 2024-02-20 DIAGNOSIS — F51.04 PSYCHOPHYSIOLOGICAL INSOMNIA: ICD-10-CM

## 2024-02-20 PROCEDURE — 80048 BASIC METABOLIC PNL TOTAL CA: CPT | Performed by: PHYSICIAN ASSISTANT

## 2024-02-20 PROCEDURE — 36415 COLL VENOUS BLD VENIPUNCTURE: CPT | Performed by: PHYSICIAN ASSISTANT

## 2024-02-20 PROCEDURE — 85025 COMPLETE CBC W/AUTO DIFF WBC: CPT | Performed by: PHYSICIAN ASSISTANT

## 2024-02-20 PROCEDURE — 99214 OFFICE O/P EST MOD 30 MIN: CPT | Performed by: PHYSICIAN ASSISTANT

## 2024-02-20 ASSESSMENT — PAIN SCALES - GENERAL: PAINLEVEL: NO PAIN (0)

## 2024-02-20 NOTE — PROGRESS NOTES
Preoperative Evaluation  [facility]  [address]  [address]  [place] [address]  Phone: [phone]  Fax: [phone]  Primary Provider: Jose Daniel Arredondo  Pre-op Performing Provider: KRYSTYNA TOLEDO  Feb 20, 2024       Felicia is a 48 year old, presenting for the following:  Pre-Op Exam        2/20/2024     4:03 PM   Additional Questions   Roomed by JAYANT Villa   Accompanied by NA         2/20/2024     4:03 PM   Patient Reported Additional Medications   Patient reports taking the following new medications NA     Surgical Information  Surgery/Procedure: Hysterectomy   Surgery Location: [facility] in [place], [place]   Surgeon: Shelly Diaz MD   Surgery Date: 3/13/2024  Time of Surgery: 10 AM   Where patient plans to recover: At home with family  Fax number for surgical facility: Note does not need to be faxed, will be available electronically in Epic.    Assessment & Plan     The proposed surgical procedure is considered INTERMEDIATE risk.    Preop general physical exam    - CBC with platelets and differential; Future  - Basic metabolic panel  (Ca, Cl, CO2, Creat, Gluc, K, Na, BUN); Future  - CBC with platelets and differential  - Basic metabolic panel  (Ca, Cl, CO2, Creat, Gluc, K, Na, BUN)    Excessive bleeding in premenopausal period    Iron deficiency anemia, unspecified iron deficiency anemia type    Migraine without status migrainosus, not intractable, unspecified migraine type    Multiple food allergies    Psychophysiological insomnia    PUD (peptic ulcer disease)    Chronic fatigue syndrome    Anxiety       - No identified additional risk factors other than previously addressed    Antiplatelet or Anticoagulation Medication Instructions   - Patient is on no antiplatelet or anticoagulation medications.    Additional Medication Instructions  Patient is on no additional chronic medications    Recommendation  APPROVAL GIVEN to proceed with  proposed procedure, without further diagnostic evaluation.      Subjective       HPI related to upcoming procedure: Patient is having hysterectomy, removal of uterus and ovaries, will leave fallopian tubes.  Surgery is being done due to chronically heavy menstrual cycles and history of low hemoglobin as a result.          2/20/2024     4:01 PM   Preop Questions   1. Have you ever had a heart attack or stroke? No   2. Have you ever had surgery on your heart or blood vessels, such as a stent placement, a coronary artery bypass, or surgery on an artery in your head, neck, heart, or legs? No   3. Do you have chest pain with activity? No   4. Do you have a history of  heart failure? No   5. Do you currently have a cold, bronchitis or symptoms of other infection? No   6. Do you have a cough, shortness of breath, or wheezing? No   7. Do you or anyone in your family have previous history of blood clots? YES - Patient's daughter has polycythemia.  Nothing known for the patient.     8. Do you or does anyone in your family have a serious bleeding problem such as prolonged bleeding following surgeries or cuts? No   9. Have you ever had problems with anemia or been told to take iron pills? YES - Low hemoglobin due to heavy menstrual cycles.    10. Have you had any abnormal blood loss such as black, tarry or bloody stools, or abnormal vaginal bleeding? No   11. Have you ever had a blood transfusion? No   12. Are you willing to have a blood transfusion if it is medically needed before, during, or after your surgery? Yes   13. Have you or any of your relatives ever had problems with anesthesia? No   14. Do you have sleep apnea, excessive snoring or daytime drowsiness? No   15. Do you have any artifical heart valves or other implanted medical devices like a pacemaker, defibrillator, or continuous glucose monitor? No   16. Do you have artificial joints? No   17. Are you allergic to latex? YES: Allergic to Latex and Iodine and IV  contrast.  Please note multiple drug and antibiotic allergies for patient as well.     18. Is there any chance that you may be pregnant? No       Health Care Directive  Patient does not have a Health Care Directive or Living Will: Discussed advance care planning with patient; however, patient declined at this time.    Preoperative Review of    reviewed - controlled substances reflected in medication list.      Status of Chronic Conditions:  See problem list for active medical problems.  Problems all longstanding and stable, except as noted/documented.  See ROS for pertinent symptoms related to these conditions.    Patient Active Problem List    Diagnosis Date Noted    Chronic fatigue syndrome 09/01/2022     Priority: Medium    Menopausal and postmenopausal disorder 09/01/2022     Priority: Medium    Iron deficiency anemia 09/01/2022     Priority: Medium    Migraine without status migrainosus, not intractable 04/29/2019     Priority: Medium    Vitamin D deficiency 09/20/2018     Priority: Medium    Anxiety 06/09/2018     Priority: Medium    Menorrhagia 09/12/2017     Priority: Medium    Psychophysiological insomnia 04/13/2017     Priority: Medium    High risk HPV infection 08/03/2015     Priority: Medium     Previous HPV per patient on PAP in 2013 - was told to have yearly PAPs by Juhi MOON  08/03/15 NIL, +HPV 18, plan colp  11/16/15 [place] Done. No bx taken. Dx pap NIL with Neg HPV. Plan: cotest in 1 year  01/13/17 Dx pap= NIL, Neg HPV. 3 yr co-test per [facility]  9/1/22 NIL pap, neg HR HPV. Plan 3 year cotest      Urticaria 09/16/2014     Priority: Medium     Problem list name updated by automated process. Provider to review      Multiple food allergies 09/16/2014     Priority: Medium    PUD (peptic ulcer disease) 09/16/2014     Priority: Medium      Past Medical History:   Diagnosis Date    Abnormal weight gain 2/28/2017    Adenitis 4/30/2019    Anxiety 6/9/2018    Arthritis     Dyspepsia 3/18/2019    High risk  HPV infection 08/03/15    NIL, +HPV 18, plan colp    History of colposcopy 11/16/15    No bx taken    Hives     multiple allergies - food, environmental    Insomnia     Migraine without status migrainosus, not intractable 4/29/2019     Past Surgical History:   Procedure Laterality Date    COLONOSCOPY N/A 5/1/2023    Procedure: Colonoscopy;  Surgeon: Page Neal MD;  Location: [facility]    DILATION AND CURETTAGE, HYSTEROSCOPY, ABLATE ENDOMETRIUM NOVASURE, COMBINED N/A 3/16/2018    Procedure: COMBINED DILATION AND CURETTAGE, HYSTEROSCOPY, ABLATE ENDOMETRIUM NOVASURE;  Hysteroscopy, Dilation and Curettage, unable to perform Endometrial Ablation with Novasure secondary to uterine perforation, diagnostic laparoscopy, Bilateral Laparoscopic Tubal Ligation ;  Surgeon: Danilo White MD;  Location: [facility] OR    EYE SURGERY      REmoval of mass left eye    LAPAROSCOPIC TUBAL LIGATION Bilateral 3/16/2018    Procedure: LAPAROSCOPIC TUBAL LIGATION;;  Surgeon: Danilo White MD;  Location: [facility] OR    LAPAROSCOPY DIAGNOSTIC (GYN)      SURGICAL HISTORY OF -       2010 turbinate surgery     Current Outpatient Medications   Medication Sig Dispense Refill    ALPRAZolam (XANAX) 0.5 MG tablet Take 1 tablet (0.5 mg) by mouth 2 times daily as needed for anxiety 30 tablet 0    oxymetazoline (AFRIN) 0.05 % nasal spray Spray 2 sprays into both nostrils 2 times daily 37 mL 0       Allergies   Allergen Reactions    Bactrim [Sulfamethoxazole-Trimethoprim] Swelling     Tongue swelling    Cucumber Extract Shortness Of Breath and Other (See Comments)     Tongue swelling    Effexor [Venlafaxine] Hives, Rash and Blisters    Amoxicillin Swelling     Tongue swelling    Doxycycline Hives and Itching    Iodine Hives    Latex Hives     Lip swelling    Sulfa Antibiotics Hives    Watermelon [Citrullus Vulgaris] Hives     itching    Zolpidem Unknown    Zolpidem Tartrate Swelling     Tongue swelling    Amoxicillin-Pot Clavulanate Rash     Tongue  "swelling    Eszopiclone Swelling and Rash     Tongue swelling    Iodinated Contrast Media Rash        Social History     Tobacco Use    Smoking status: Never     Passive exposure: Never    Smokeless tobacco: Never   Substance Use Topics    Alcohol use: No     Alcohol/week: 0.0 standard drinks of alcohol     Family History   Problem Relation Age of Onset    Neurologic Disorder Mother 57        brain anuerysm    Hypertension Father     Other Cancer Father 70        Testicle Cancer    Diabetes Father     Diabetes Paternal Grandmother     Other - See Comments Daughter 21        rare blood disorders    Polycythemia Daughter      History   Drug Use No         Review of Systems    Review of Systems  Constitutional, neuro, ENT, endocrine, pulmonary, cardiac, gastrointestinal, genitourinary, musculoskeletal, integument and psychiatric systems are negative, except as otherwise noted.  Objective    /69   Pulse 66   Temp 97.9  F (36.6  C) (Oral)   Resp 16   Ht 1.575 m (5' 2\")   Wt 75.1 kg (165 lb 9.6 oz)   LMP 02/16/2024 (Exact Date)   SpO2 100%   BMI 30.29 kg/m     Estimated body mass index is 30.29 kg/m  as calculated from the following:    Height as of this encounter: 1.575 m (5' 2\").    Weight as of this encounter: 75.1 kg (165 lb 9.6 oz).  Physical Exam  GENERAL: alert and no distress  EYES: Eyes grossly normal to inspection, PERRL and conjunctivae and sclerae normal  HENT: ear canals and TM's normal, nose and mouth without ulcers or lesions  NECK: no adenopathy, no asymmetry, masses, or scars  RESP: lungs clear to auscultation - no rales, rhonchi or wheezes  CV: regular rate and rhythm, normal S1 S2, no S3 or S4, no murmur, click or rub, no peripheral edema  ABDOMEN: soft, nontender, no hepatosplenomegaly, no masses and bowel sounds normal  MS: no gross musculoskeletal defects noted, no edema  SKIN: no suspicious lesions or rashes  NEURO: Normal strength and tone, mentation intact and speech normal    No " "results for input(s): \"HGB\", \"PLT\", \"INR\", \"NA\", \"POTASSIUM\", \"CR\", \"A1C\" in the last 68122 hours.     Diagnostics  Labs - Pending   No EKG required, no history of coronary heart disease, significant arrhythmia, peripheral arterial disease or other structural heart disease.    Revised Cardiac Risk Index (RCRI)  The patient has the following serious cardiovascular risks for perioperative complications:   - No serious cardiac risks = 0 points     RCRI Interpretation: 0 points: Class I (very low risk - 0.4% complication rate)         Signed Electronically by: Eloisa English PA-C  Copy of this evaluation report is provided to requesting physician.         "

## 2024-02-21 LAB
ANION GAP SERPL CALCULATED.3IONS-SCNC: 11 MMOL/L (ref 7–15)
BASOPHILS # BLD AUTO: 0 10E3/UL (ref 0–0.2)
BASOPHILS NFR BLD AUTO: 1 %
BUN SERPL-MCNC: 11 MG/DL (ref 6–20)
CALCIUM SERPL-MCNC: 9 MG/DL (ref 8.6–10)
CHLORIDE SERPL-SCNC: 103 MMOL/L (ref 98–107)
CREAT SERPL-MCNC: 0.77 MG/DL (ref 0.51–0.95)
DEPRECATED HCO3 PLAS-SCNC: 24 MMOL/L (ref 22–29)
EGFRCR SERPLBLD CKD-EPI 2021: >90 ML/MIN/1.73M2
EOSINOPHIL # BLD AUTO: 0.3 10E3/UL (ref 0–0.7)
EOSINOPHIL NFR BLD AUTO: 4 %
ERYTHROCYTE [DISTWIDTH] IN BLOOD BY AUTOMATED COUNT: 16.5 % (ref 10–15)
GLUCOSE SERPL-MCNC: 88 MG/DL (ref 70–99)
HCT VFR BLD AUTO: 33.7 % (ref 35–47)
HGB BLD-MCNC: 10 G/DL (ref 11.7–15.7)
IMM GRANULOCYTES # BLD: 0 10E3/UL
IMM GRANULOCYTES NFR BLD: 0 %
LYMPHOCYTES # BLD AUTO: 3.4 10E3/UL (ref 0.8–5.3)
LYMPHOCYTES NFR BLD AUTO: 43 %
MCH RBC QN AUTO: 21.4 PG (ref 26.5–33)
MCHC RBC AUTO-ENTMCNC: 29.7 G/DL (ref 31.5–36.5)
MCV RBC AUTO: 72 FL (ref 78–100)
MONOCYTES # BLD AUTO: 0.6 10E3/UL (ref 0–1.3)
MONOCYTES NFR BLD AUTO: 8 %
NEUTROPHILS # BLD AUTO: 3.4 10E3/UL (ref 1.6–8.3)
NEUTROPHILS NFR BLD AUTO: 44 %
NRBC # BLD AUTO: 0 10E3/UL
NRBC BLD AUTO-RTO: 0 /100
PLATELET # BLD AUTO: 235 10E3/UL (ref 150–450)
POTASSIUM SERPL-SCNC: 4.6 MMOL/L (ref 3.4–5.3)
RBC # BLD AUTO: 4.68 10E6/UL (ref 3.8–5.2)
SODIUM SERPL-SCNC: 138 MMOL/L (ref 135–145)
WBC # BLD AUTO: 7.8 10E3/UL (ref 4–11)

## 2024-02-22 NOTE — RESULT ENCOUNTER NOTE
Oneal Duarte ,    The results from your recent lab work appear fine, but the hemoglobin is low.  I do know that this is a large reason for the surgery, due to your heavy menstrual cycles, but I would recommend you discuss these findings with your OB/GYN.  Please contact us with any questions you may have.      Thank you for choosing [facility] for your health care needs,      Eloisa English PA-C

## 2024-02-27 DIAGNOSIS — Z01.818 PREOPERATIVE EXAMINATION: Primary | ICD-10-CM

## 2024-02-27 RX ORDER — METRONIDAZOLE 500 MG/1
500 TABLET ORAL 2 TIMES DAILY
Qty: 14 TABLET | Refills: 0 | Status: SHIPPED | OUTPATIENT
Start: 2024-02-27 | End: 2024-03-05

## 2024-03-01 NOTE — TELEPHONE ENCOUNTER
Time change to 9:40 am, made with Meri at [facility]  Called patient and time change verified

## 2024-03-04 NOTE — TELEPHONE ENCOUNTER
For ease of cases this surgery time has changed as follows    SX 12:50p  ARRIVAL 10:50a  GATORADE 8:50a    Pt aware via phone call    Caitlyn Palmer  Surgery Scheduler

## 2024-03-12 RX ORDER — CETIRIZINE HYDROCHLORIDE 10 MG/1
10 TABLET ORAL 2 TIMES DAILY
COMMUNITY

## 2024-03-12 NOTE — PROGRESS NOTES
PTA medications updated by Medication Scribe prior to surgery via phone call with patient (last doses completed by Nurse)     Medication history sources: Patient, Surescripts, and H&P  In the past week, patient estimated taking medication this percent of the time: Greater than 90%      Significant changes made to the medication list:  None      Additional medication history information:   None    Medication reconciliation completed by provider prior to medication history? No    Time spent in this activity: 15 MINUTES    The information provided in this note is only as accurate as the sources available at the time of update(s)      Prior to Admission medications    Medication Sig Last Dose Taking? Auth Provider Long Term End Date   ALPRAZolam (XANAX) 0.5 MG tablet Take 1 tablet (0.5 mg) by mouth 2 times daily as needed for anxiety  at PRN Yes Larry Chun MD     cetirizine (ZYRTEC) 10 MG tablet Take 10 mg by mouth 2 times daily  at PM Yes Reported, Patient     oxymetazoline (AFRIN) 0.05 % nasal spray Spray 2 sprays into both nostrils 2 times daily  at PRN Yes Olivier Ardon MD         Medication history completed by: Rossana Moore

## 2024-03-13 ENCOUNTER — ANESTHESIA (OUTPATIENT)
Dept: SURGERY | Facility: CLINIC | Age: 49
End: 2024-03-13
Payer: COMMERCIAL

## 2024-03-13 ENCOUNTER — HOSPITAL ENCOUNTER (OUTPATIENT)
Facility: CLINIC | Age: 49
Discharge: HOME | End: 2024-03-14
Attending: OBSTETRICS & GYNECOLOGY | Admitting: OBSTETRICS & GYNECOLOGY
Payer: COMMERCIAL

## 2024-03-13 ENCOUNTER — ANESTHESIA EVENT (OUTPATIENT)
Dept: SURGERY | Facility: CLINIC | Age: 49
End: 2024-03-13
Payer: COMMERCIAL

## 2024-03-13 DIAGNOSIS — Z90.710 S/P HYSTERECTOMY: Primary | ICD-10-CM

## 2024-03-13 LAB
ABO/RH(D): NORMAL
ANTIBODY SCREEN: NEGATIVE
HGB BLD-MCNC: 10.1 G/DL (ref 11.7–15.7)
SPECIMEN EXPIRATION DATE: NORMAL

## 2024-03-13 PROCEDURE — 58550 LAPARO-ASST VAG HYSTERECTOMY: CPT | Performed by: ANESTHESIOLOGY

## 2024-03-13 PROCEDURE — 250N000009 HC RX 250: Performed by: NURSE ANESTHETIST, CERTIFIED REGISTERED

## 2024-03-13 PROCEDURE — 250N000011 HC RX IP 250 OP 636: Performed by: NURSE ANESTHETIST, CERTIFIED REGISTERED

## 2024-03-13 PROCEDURE — 250N000011 HC RX IP 250 OP 636: Performed by: REGISTERED NURSE

## 2024-03-13 PROCEDURE — 58550 LAPARO-ASST VAG HYSTERECTOMY: CPT | Performed by: REGISTERED NURSE

## 2024-03-13 PROCEDURE — 272N000001 HC OR GENERAL SUPPLY STERILE: Performed by: OBSTETRICS & GYNECOLOGY

## 2024-03-13 PROCEDURE — 85018 HEMOGLOBIN: CPT | Performed by: OBSTETRICS & GYNECOLOGY

## 2024-03-13 PROCEDURE — 250N000025 HC SEVOFLURANE, PER MIN: Performed by: OBSTETRICS & GYNECOLOGY

## 2024-03-13 PROCEDURE — 258N000003 HC RX IP 258 OP 636: Performed by: OBSTETRICS & GYNECOLOGY

## 2024-03-13 PROCEDURE — 36415 COLL VENOUS BLD VENIPUNCTURE: CPT | Performed by: ANESTHESIOLOGY

## 2024-03-13 PROCEDURE — 360N000077 HC SURGERY LEVEL 4, PER MIN: Performed by: OBSTETRICS & GYNECOLOGY

## 2024-03-13 PROCEDURE — 36415 COLL VENOUS BLD VENIPUNCTURE: CPT | Performed by: OBSTETRICS & GYNECOLOGY

## 2024-03-13 PROCEDURE — 88307 TISSUE EXAM BY PATHOLOGIST: CPT | Mod: TC | Performed by: OBSTETRICS & GYNECOLOGY

## 2024-03-13 PROCEDURE — 86900 BLOOD TYPING SEROLOGIC ABO: CPT | Performed by: ANESTHESIOLOGY

## 2024-03-13 PROCEDURE — 370N000017 HC ANESTHESIA TECHNICAL FEE, PER MIN: Performed by: OBSTETRICS & GYNECOLOGY

## 2024-03-13 PROCEDURE — 250N000011 HC RX IP 250 OP 636: Mod: JZ | Performed by: OBSTETRICS & GYNECOLOGY

## 2024-03-13 PROCEDURE — 58552 LAPARO-VAG HYST INCL T/O: CPT | Mod: 80 | Performed by: OBSTETRICS & GYNECOLOGY

## 2024-03-13 PROCEDURE — 58552 LAPARO-VAG HYST INCL T/O: CPT | Performed by: OBSTETRICS & GYNECOLOGY

## 2024-03-13 PROCEDURE — 258N000003 HC RX IP 258 OP 636: Performed by: NURSE ANESTHETIST, CERTIFIED REGISTERED

## 2024-03-13 PROCEDURE — 250N000013 HC RX MED GY IP 250 OP 250 PS 637: Performed by: OBSTETRICS & GYNECOLOGY

## 2024-03-13 PROCEDURE — 56800 PLASTIC REPAIR INTROITUS: CPT | Mod: 51 | Performed by: OBSTETRICS & GYNECOLOGY

## 2024-03-13 PROCEDURE — 250N000009 HC RX 250: Performed by: OBSTETRICS & GYNECOLOGY

## 2024-03-13 PROCEDURE — 56800 PLASTIC REPAIR INTROITUS: CPT | Mod: 80 | Performed by: OBSTETRICS & GYNECOLOGY

## 2024-03-13 PROCEDURE — 999N000141 HC STATISTIC PRE-PROCEDURE NURSING ASSESSMENT: Performed by: OBSTETRICS & GYNECOLOGY

## 2024-03-13 PROCEDURE — 250N000009 HC RX 250: Performed by: REGISTERED NURSE

## 2024-03-13 PROCEDURE — 710N000009 HC RECOVERY PHASE 1, LEVEL 1, PER MIN: Performed by: OBSTETRICS & GYNECOLOGY

## 2024-03-13 RX ORDER — OXYCODONE HYDROCHLORIDE 5 MG/1
5-10 TABLET ORAL
Qty: 30 TABLET | Refills: 0 | Status: SHIPPED | OUTPATIENT
Start: 2024-03-13 | End: 2024-03-14

## 2024-03-13 RX ORDER — ONDANSETRON 2 MG/ML
4 INJECTION INTRAMUSCULAR; INTRAVENOUS EVERY 30 MIN PRN
Status: DISCONTINUED | OUTPATIENT
Start: 2024-03-13 | End: 2024-03-13

## 2024-03-13 RX ORDER — OXYCODONE HYDROCHLORIDE 5 MG/1
5 TABLET ORAL EVERY 4 HOURS PRN
Status: DISCONTINUED | OUTPATIENT
Start: 2024-03-13 | End: 2024-03-14 | Stop reason: HOSPADM

## 2024-03-13 RX ORDER — PROPOFOL 10 MG/ML
INJECTION, EMULSION INTRAVENOUS PRN
Status: DISCONTINUED | OUTPATIENT
Start: 2024-03-13 | End: 2024-03-13

## 2024-03-13 RX ORDER — AMOXICILLIN 250 MG
1-2 CAPSULE ORAL 2 TIMES DAILY
Qty: 30 TABLET | Refills: 0 | Status: SHIPPED | OUTPATIENT
Start: 2024-03-13 | End: 2024-06-07

## 2024-03-13 RX ORDER — HYDROXYZINE HYDROCHLORIDE 25 MG/1
25 TABLET, FILM COATED ORAL EVERY 6 HOURS PRN
Status: DISCONTINUED | OUTPATIENT
Start: 2024-03-13 | End: 2024-03-14 | Stop reason: HOSPADM

## 2024-03-13 RX ORDER — HYDROMORPHONE HCL IN WATER/PF 6 MG/30 ML
0.4 PATIENT CONTROLLED ANALGESIA SYRINGE INTRAVENOUS EVERY 5 MIN PRN
Status: DISCONTINUED | OUTPATIENT
Start: 2024-03-13 | End: 2024-03-13 | Stop reason: HOSPADM

## 2024-03-13 RX ORDER — FAMOTIDINE 20 MG/1
20 TABLET, FILM COATED ORAL 2 TIMES DAILY
Status: DISCONTINUED | OUTPATIENT
Start: 2024-03-13 | End: 2024-03-14 | Stop reason: HOSPADM

## 2024-03-13 RX ORDER — CETIRIZINE HYDROCHLORIDE 10 MG/1
10 TABLET ORAL 2 TIMES DAILY
Status: DISCONTINUED | OUTPATIENT
Start: 2024-03-13 | End: 2024-03-14 | Stop reason: HOSPADM

## 2024-03-13 RX ORDER — OXYCODONE HYDROCHLORIDE 5 MG/1
10 TABLET ORAL
Status: DISCONTINUED | OUTPATIENT
Start: 2024-03-13 | End: 2024-03-13

## 2024-03-13 RX ORDER — FENTANYL CITRATE 50 UG/ML
25 INJECTION, SOLUTION INTRAMUSCULAR; INTRAVENOUS
Status: DISCONTINUED | OUTPATIENT
Start: 2024-03-13 | End: 2024-03-13

## 2024-03-13 RX ORDER — ACETAMINOPHEN 325 MG/1
650 TABLET ORAL EVERY 6 HOURS PRN
Status: DISCONTINUED | OUTPATIENT
Start: 2024-03-13 | End: 2024-03-14 | Stop reason: HOSPADM

## 2024-03-13 RX ORDER — LIDOCAINE HYDROCHLORIDE 20 MG/ML
INJECTION, SOLUTION INFILTRATION; PERINEURAL PRN
Status: DISCONTINUED | OUTPATIENT
Start: 2024-03-13 | End: 2024-03-13

## 2024-03-13 RX ORDER — ONDANSETRON 4 MG/1
4 TABLET, ORALLY DISINTEGRATING ORAL EVERY 30 MIN PRN
Status: DISCONTINUED | OUTPATIENT
Start: 2024-03-13 | End: 2024-03-13

## 2024-03-13 RX ORDER — ONDANSETRON 4 MG/1
4 TABLET, ORALLY DISINTEGRATING ORAL EVERY 6 HOURS PRN
Status: DISCONTINUED | OUTPATIENT
Start: 2024-03-13 | End: 2024-03-14 | Stop reason: HOSPADM

## 2024-03-13 RX ORDER — OXYCODONE HYDROCHLORIDE 5 MG/1
10 TABLET ORAL EVERY 4 HOURS PRN
Status: DISCONTINUED | OUTPATIENT
Start: 2024-03-13 | End: 2024-03-14 | Stop reason: HOSPADM

## 2024-03-13 RX ORDER — ONDANSETRON 4 MG/1
4-8 TABLET, ORALLY DISINTEGRATING ORAL EVERY 8 HOURS PRN
Qty: 10 TABLET | Refills: 0 | Status: SHIPPED | OUTPATIENT
Start: 2024-03-13 | End: 2024-03-20

## 2024-03-13 RX ORDER — HYDROXYZINE HYDROCHLORIDE 25 MG/1
25 TABLET, FILM COATED ORAL EVERY 6 HOURS PRN
Qty: 30 TABLET | Refills: 0 | Status: SHIPPED | OUTPATIENT
Start: 2024-03-13 | End: 2024-04-09

## 2024-03-13 RX ORDER — ALPRAZOLAM 0.25 MG
0.5 TABLET ORAL 2 TIMES DAILY PRN
Status: DISCONTINUED | OUTPATIENT
Start: 2024-03-13 | End: 2024-03-14 | Stop reason: HOSPADM

## 2024-03-13 RX ORDER — LIDOCAINE HYDROCHLORIDE AND EPINEPHRINE 10; 10 MG/ML; UG/ML
INJECTION, SOLUTION INFILTRATION; PERINEURAL
Status: DISCONTINUED
Start: 2024-03-13 | End: 2024-03-13 | Stop reason: WASHOUT

## 2024-03-13 RX ORDER — CEFAZOLIN SODIUM/WATER 2 G/20 ML
2 SYRINGE (ML) INTRAVENOUS SEE ADMIN INSTRUCTIONS
Status: DISCONTINUED | OUTPATIENT
Start: 2024-03-13 | End: 2024-03-13 | Stop reason: HOSPADM

## 2024-03-13 RX ORDER — ONDANSETRON 2 MG/ML
INJECTION INTRAMUSCULAR; INTRAVENOUS PRN
Status: DISCONTINUED | OUTPATIENT
Start: 2024-03-13 | End: 2024-03-13

## 2024-03-13 RX ORDER — ONDANSETRON 2 MG/ML
4 INJECTION INTRAMUSCULAR; INTRAVENOUS EVERY 6 HOURS PRN
Status: DISCONTINUED | OUTPATIENT
Start: 2024-03-13 | End: 2024-03-14 | Stop reason: HOSPADM

## 2024-03-13 RX ORDER — SODIUM CHLORIDE, SODIUM LACTATE, POTASSIUM CHLORIDE, CALCIUM CHLORIDE 600; 310; 30; 20 MG/100ML; MG/100ML; MG/100ML; MG/100ML
INJECTION, SOLUTION INTRAVENOUS CONTINUOUS
Status: DISCONTINUED | OUTPATIENT
Start: 2024-03-13 | End: 2024-03-14 | Stop reason: HOSPADM

## 2024-03-13 RX ORDER — ONDANSETRON 2 MG/ML
4 INJECTION INTRAMUSCULAR; INTRAVENOUS EVERY 30 MIN PRN
Status: DISCONTINUED | OUTPATIENT
Start: 2024-03-13 | End: 2024-03-13 | Stop reason: HOSPADM

## 2024-03-13 RX ORDER — SODIUM CHLORIDE, SODIUM LACTATE, POTASSIUM CHLORIDE, CALCIUM CHLORIDE 600; 310; 30; 20 MG/100ML; MG/100ML; MG/100ML; MG/100ML
INJECTION, SOLUTION INTRAVENOUS CONTINUOUS PRN
Status: DISCONTINUED | OUTPATIENT
Start: 2024-03-13 | End: 2024-03-13

## 2024-03-13 RX ORDER — ESTRADIOL 0.1 MG/G
CREAM VAGINAL
Status: DISCONTINUED
Start: 2024-03-13 | End: 2024-03-13 | Stop reason: HOSPADM

## 2024-03-13 RX ORDER — BUPIVACAINE HYDROCHLORIDE AND EPINEPHRINE 5; 5 MG/ML; UG/ML
INJECTION, SOLUTION EPIDURAL; INTRACAUDAL; PERINEURAL
Status: DISCONTINUED
Start: 2024-03-13 | End: 2024-03-13 | Stop reason: HOSPADM

## 2024-03-13 RX ORDER — FENTANYL CITRATE 0.05 MG/ML
50 INJECTION, SOLUTION INTRAMUSCULAR; INTRAVENOUS EVERY 5 MIN PRN
Status: DISCONTINUED | OUTPATIENT
Start: 2024-03-13 | End: 2024-03-13 | Stop reason: HOSPADM

## 2024-03-13 RX ORDER — SODIUM CHLORIDE, SODIUM LACTATE, POTASSIUM CHLORIDE, CALCIUM CHLORIDE 600; 310; 30; 20 MG/100ML; MG/100ML; MG/100ML; MG/100ML
INJECTION, SOLUTION INTRAVENOUS CONTINUOUS
Status: DISCONTINUED | OUTPATIENT
Start: 2024-03-13 | End: 2024-03-13 | Stop reason: HOSPADM

## 2024-03-13 RX ORDER — HYDROMORPHONE HCL IN WATER/PF 6 MG/30 ML
0.2 PATIENT CONTROLLED ANALGESIA SYRINGE INTRAVENOUS
Status: DISCONTINUED | OUTPATIENT
Start: 2024-03-13 | End: 2024-03-14 | Stop reason: HOSPADM

## 2024-03-13 RX ORDER — CALCIUM CARBONATE 500 MG/1
500 TABLET, CHEWABLE ORAL 4 TIMES DAILY PRN
Status: DISCONTINUED | OUTPATIENT
Start: 2024-03-13 | End: 2024-03-14 | Stop reason: HOSPADM

## 2024-03-13 RX ORDER — CEFAZOLIN SODIUM/WATER 2 G/20 ML
2 SYRINGE (ML) INTRAVENOUS
Status: COMPLETED | OUTPATIENT
Start: 2024-03-13 | End: 2024-03-13

## 2024-03-13 RX ORDER — SIMETHICONE 80 MG
80 TABLET,CHEWABLE ORAL EVERY 6 HOURS PRN
Status: DISCONTINUED | OUTPATIENT
Start: 2024-03-13 | End: 2024-03-14 | Stop reason: HOSPADM

## 2024-03-13 RX ORDER — LIDOCAINE 40 MG/G
CREAM TOPICAL
Status: DISCONTINUED | OUTPATIENT
Start: 2024-03-13 | End: 2024-03-14 | Stop reason: HOSPADM

## 2024-03-13 RX ORDER — IBUPROFEN 600 MG/1
600 TABLET, FILM COATED ORAL EVERY 6 HOURS PRN
Qty: 30 TABLET | Refills: 0 | Status: SHIPPED | OUTPATIENT
Start: 2024-03-13 | End: 2024-03-20

## 2024-03-13 RX ORDER — PROPOFOL 10 MG/ML
INJECTION, EMULSION INTRAVENOUS CONTINUOUS PRN
Status: DISCONTINUED | OUTPATIENT
Start: 2024-03-13 | End: 2024-03-13

## 2024-03-13 RX ORDER — ACETAMINOPHEN 325 MG/1
975 TABLET ORAL ONCE
Status: DISCONTINUED | OUTPATIENT
Start: 2024-03-13 | End: 2024-03-13 | Stop reason: HOSPADM

## 2024-03-13 RX ORDER — FENTANYL CITRATE 50 UG/ML
INJECTION, SOLUTION INTRAMUSCULAR; INTRAVENOUS PRN
Status: DISCONTINUED | OUTPATIENT
Start: 2024-03-13 | End: 2024-03-13

## 2024-03-13 RX ORDER — HYDROMORPHONE HCL IN WATER/PF 6 MG/30 ML
0.2 PATIENT CONTROLLED ANALGESIA SYRINGE INTRAVENOUS EVERY 5 MIN PRN
Status: DISCONTINUED | OUTPATIENT
Start: 2024-03-13 | End: 2024-03-13 | Stop reason: HOSPADM

## 2024-03-13 RX ORDER — ACETAMINOPHEN 325 MG/1
975 TABLET ORAL
Status: DISCONTINUED | OUTPATIENT
Start: 2024-03-13 | End: 2024-03-13

## 2024-03-13 RX ORDER — PROCHLORPERAZINE MALEATE 10 MG
10 TABLET ORAL EVERY 6 HOURS PRN
Status: DISCONTINUED | OUTPATIENT
Start: 2024-03-13 | End: 2024-03-14 | Stop reason: HOSPADM

## 2024-03-13 RX ORDER — MEPERIDINE HYDROCHLORIDE 25 MG/ML
12.5 INJECTION INTRAMUSCULAR; INTRAVENOUS; SUBCUTANEOUS EVERY 5 MIN PRN
Status: DISCONTINUED | OUTPATIENT
Start: 2024-03-13 | End: 2024-03-13 | Stop reason: HOSPADM

## 2024-03-13 RX ORDER — OXYCODONE HYDROCHLORIDE 5 MG/1
5 TABLET ORAL
Status: DISCONTINUED | OUTPATIENT
Start: 2024-03-13 | End: 2024-03-13

## 2024-03-13 RX ORDER — DEXAMETHASONE SODIUM PHOSPHATE 4 MG/ML
INJECTION, SOLUTION INTRA-ARTICULAR; INTRALESIONAL; INTRAMUSCULAR; INTRAVENOUS; SOFT TISSUE PRN
Status: DISCONTINUED | OUTPATIENT
Start: 2024-03-13 | End: 2024-03-13

## 2024-03-13 RX ORDER — ONDANSETRON 4 MG/1
4 TABLET, ORALLY DISINTEGRATING ORAL EVERY 30 MIN PRN
Status: DISCONTINUED | OUTPATIENT
Start: 2024-03-13 | End: 2024-03-13 | Stop reason: HOSPADM

## 2024-03-13 RX ORDER — NALOXONE HYDROCHLORIDE 0.4 MG/ML
0.1 INJECTION, SOLUTION INTRAMUSCULAR; INTRAVENOUS; SUBCUTANEOUS
Status: DISCONTINUED | OUTPATIENT
Start: 2024-03-13 | End: 2024-03-13 | Stop reason: HOSPADM

## 2024-03-13 RX ORDER — ACETAMINOPHEN 325 MG/1
650 TABLET ORAL EVERY 4 HOURS PRN
Qty: 100 TABLET | Refills: 0 | Status: SHIPPED | OUTPATIENT
Start: 2024-03-13 | End: 2024-04-23

## 2024-03-13 RX ORDER — LIDOCAINE HYDROCHLORIDE 10 MG/ML
INJECTION, SOLUTION EPIDURAL; INFILTRATION; INTRACAUDAL; PERINEURAL
Status: DISCONTINUED
Start: 2024-03-13 | End: 2024-03-13 | Stop reason: HOSPADM

## 2024-03-13 RX ORDER — PHENAZOPYRIDINE HYDROCHLORIDE 200 MG/1
200 TABLET, FILM COATED ORAL ONCE
Status: DISCONTINUED | OUTPATIENT
Start: 2024-03-13 | End: 2024-03-13 | Stop reason: HOSPADM

## 2024-03-13 RX ORDER — IBUPROFEN 600 MG/1
600 TABLET, FILM COATED ORAL EVERY 6 HOURS PRN
Status: DISCONTINUED | OUTPATIENT
Start: 2024-03-13 | End: 2024-03-14 | Stop reason: HOSPADM

## 2024-03-13 RX ORDER — FENTANYL CITRATE 0.05 MG/ML
25 INJECTION, SOLUTION INTRAMUSCULAR; INTRAVENOUS EVERY 5 MIN PRN
Status: DISCONTINUED | OUTPATIENT
Start: 2024-03-13 | End: 2024-03-13 | Stop reason: HOSPADM

## 2024-03-13 RX ORDER — HYDROMORPHONE HCL IN WATER/PF 6 MG/30 ML
0.4 PATIENT CONTROLLED ANALGESIA SYRINGE INTRAVENOUS
Status: DISCONTINUED | OUTPATIENT
Start: 2024-03-13 | End: 2024-03-14 | Stop reason: HOSPADM

## 2024-03-13 RX ORDER — NALOXONE HYDROCHLORIDE 0.4 MG/ML
0.1 INJECTION, SOLUTION INTRAMUSCULAR; INTRAVENOUS; SUBCUTANEOUS
Status: DISCONTINUED | OUTPATIENT
Start: 2024-03-13 | End: 2024-03-13

## 2024-03-13 RX ORDER — METRONIDAZOLE 500 MG/100ML
500 INJECTION, SOLUTION INTRAVENOUS ONCE
Status: COMPLETED | OUTPATIENT
Start: 2024-03-13 | End: 2024-03-13

## 2024-03-13 RX ORDER — VASOPRESSIN 20 U/ML
INJECTION PARENTERAL
Status: DISCONTINUED
Start: 2024-03-13 | End: 2024-03-13 | Stop reason: HOSPADM

## 2024-03-13 RX ADMIN — ONDANSETRON 4 MG: 2 INJECTION INTRAMUSCULAR; INTRAVENOUS at 14:40

## 2024-03-13 RX ADMIN — LIDOCAINE HYDROCHLORIDE 100 MG: 20 INJECTION, SOLUTION INFILTRATION; PERINEURAL at 13:04

## 2024-03-13 RX ADMIN — SUGAMMADEX 150 MG: 100 INJECTION, SOLUTION INTRAVENOUS at 15:28

## 2024-03-13 RX ADMIN — SODIUM CHLORIDE, POTASSIUM CHLORIDE, SODIUM LACTATE AND CALCIUM CHLORIDE: 600; 310; 30; 20 INJECTION, SOLUTION INTRAVENOUS at 15:52

## 2024-03-13 RX ADMIN — PROPOFOL 25 MCG/KG/MIN: 10 INJECTION, EMULSION INTRAVENOUS at 13:08

## 2024-03-13 RX ADMIN — METRONIDAZOLE 500 MG: 500 INJECTION, SOLUTION INTRAVENOUS at 13:22

## 2024-03-13 RX ADMIN — Medication 2 G: at 13:04

## 2024-03-13 RX ADMIN — HYDROMORPHONE HYDROCHLORIDE 0.5 MG: 1 INJECTION, SOLUTION INTRAMUSCULAR; INTRAVENOUS; SUBCUTANEOUS at 13:43

## 2024-03-13 RX ADMIN — HYDROMORPHONE HYDROCHLORIDE 0.2 MG: 1 INJECTION, SOLUTION INTRAMUSCULAR; INTRAVENOUS; SUBCUTANEOUS at 15:24

## 2024-03-13 RX ADMIN — CETIRIZINE HYDROCHLORIDE 10 MG: 10 TABLET, FILM COATED ORAL at 20:17

## 2024-03-13 RX ADMIN — DEXAMETHASONE SODIUM PHOSPHATE 4 MG: 4 INJECTION, SOLUTION INTRA-ARTICULAR; INTRALESIONAL; INTRAMUSCULAR; INTRAVENOUS; SOFT TISSUE at 13:24

## 2024-03-13 RX ADMIN — MIDAZOLAM 2 MG: 1 INJECTION INTRAMUSCULAR; INTRAVENOUS at 13:02

## 2024-03-13 RX ADMIN — SODIUM CHLORIDE, POTASSIUM CHLORIDE, SODIUM LACTATE AND CALCIUM CHLORIDE: 600; 310; 30; 20 INJECTION, SOLUTION INTRAVENOUS at 18:06

## 2024-03-13 RX ADMIN — PHENYLEPHRINE HYDROCHLORIDE 100 MCG: 10 INJECTION INTRAVENOUS at 14:31

## 2024-03-13 RX ADMIN — PROPOFOL 180 MG: 10 INJECTION, EMULSION INTRAVENOUS at 13:04

## 2024-03-13 RX ADMIN — FENTANYL CITRATE 50 MCG: 50 INJECTION INTRAMUSCULAR; INTRAVENOUS at 13:04

## 2024-03-13 RX ADMIN — SODIUM CHLORIDE, POTASSIUM CHLORIDE, SODIUM LACTATE AND CALCIUM CHLORIDE: 600; 310; 30; 20 INJECTION, SOLUTION INTRAVENOUS at 13:00

## 2024-03-13 RX ADMIN — FENTANYL CITRATE 50 MCG: 50 INJECTION INTRAMUSCULAR; INTRAVENOUS at 13:43

## 2024-03-13 RX ADMIN — ACETAMINOPHEN 650 MG: 325 TABLET, FILM COATED ORAL at 20:17

## 2024-03-13 RX ADMIN — SIMETHICONE 80 MG: 80 TABLET, CHEWABLE ORAL at 20:17

## 2024-03-13 RX ADMIN — ROCURONIUM BROMIDE 50 MG: 50 INJECTION, SOLUTION INTRAVENOUS at 13:05

## 2024-03-13 RX ADMIN — FAMOTIDINE 20 MG: 20 TABLET ORAL at 20:17

## 2024-03-13 ASSESSMENT — ACTIVITIES OF DAILY LIVING (ADL)
ADLS_ACUITY_SCORE: 18
ADLS_ACUITY_SCORE: 21
ADLS_ACUITY_SCORE: 18
ADLS_ACUITY_SCORE: 21
ADLS_ACUITY_SCORE: 33
ADLS_ACUITY_SCORE: 18

## 2024-03-13 NOTE — ANESTHESIA PREPROCEDURE EVALUATION
Anesthesia Pre-Procedure Evaluation    Patient: Felicia Bermudez   MRN: [id number] : 1975        Procedure : Procedure(s):  LAPAROSCOPY-ASSISTED VAGINAL HYSTERECTOMY AND BILATERAL SALPINGECTOMY  Cystoscopy  FEMALE GENITAL INFIBULATION TAKE DOWN          Past Medical History:   Diagnosis Date    Abnormal weight gain 2017    Adenitis 2019    Anxiety 2018    Arthritis     Dyspepsia 3/18/2019    High risk HPV infection 08/03/15    NIL, +HPV 18, plan colp    History of colposcopy 11/16/15    No bx taken    Hives     multiple allergies - food, environmental    Insomnia     Migraine without status migrainosus, not intractable 2019      Past Surgical History:   Procedure Laterality Date    COLONOSCOPY N/A 2023    Procedure: Colonoscopy;  Surgeon: Page Neal MD;  Location:  GI    DILATION AND CURETTAGE, HYSTEROSCOPY, ABLATE ENDOMETRIUM NOVASURE, COMBINED N/A 3/16/2018    Procedure: COMBINED DILATION AND CURETTAGE, HYSTEROSCOPY, ABLATE ENDOMETRIUM NOVASURE;  Hysteroscopy, Dilation and Curettage, unable to perform Endometrial Ablation with Novasure secondary to uterine perforation, diagnostic laparoscopy, Bilateral Laparoscopic Tubal Ligation ;  Surgeon: Danilo White MD;  Location: [facility] OR    EYE SURGERY      REmoval of mass left eye    LAPAROSCOPIC TUBAL LIGATION Bilateral 3/16/2018    Procedure: LAPAROSCOPIC TUBAL LIGATION;;  Surgeon: Danilo White MD;  Location: [facility] OR    LAPAROSCOPY DIAGNOSTIC (GYN)      SURGICAL HISTORY OF -        turbinate surgery      Allergies   Allergen Reactions    Bactrim [Sulfamethoxazole-Trimethoprim] Swelling     Tongue swelling    Cucumber Extract Shortness Of Breath and Other (See Comments)     Tongue swelling    Effexor [Venlafaxine] Hives, Rash and Blisters    Amoxicillin Swelling     Tongue swelling    Doxycycline Hives and Itching    Iodine Hives    Latex Hives     Lip swelling    Sulfa Antibiotics Hives    Watermelon [Citrullus  Vulgaris] Hives     itching    Zolpidem Unknown    Zolpidem Tartrate Swelling     Tongue swelling    Amoxicillin-Pot Clavulanate Rash     Tongue swelling    Eszopiclone Swelling and Rash     Tongue swelling    Iodinated Contrast Media Rash      Social History     Tobacco Use    Smoking status: Never     Passive exposure: Never    Smokeless tobacco: Never   Substance Use Topics    Alcohol use: No     Alcohol/week: 0.0 standard drinks of alcohol      Wt Readings from Last 1 Encounters:   02/20/24 75.1 kg (165 lb 9.6 oz)        Anesthesia Evaluation   Pt has had prior anesthetic.         ROS/MED HX  ENT/Pulmonary:  - neg pulmonary ROS  (-) sleep apnea   Neurologic:     (+)      migraines,                          Cardiovascular:  - neg cardiovascular ROS  (-) hypertension   METS/Exercise Tolerance:     Hematologic:       Musculoskeletal:   (+)  arthritis,             GI/Hepatic: Comment: PUD    (+) GERD,                   Renal/Genitourinary:    (-) renal disease   Endo:  - neg endo ROS     Psychiatric/Substance Use:     (+) psychiatric history anxiety       Infectious Disease:       Malignancy:       Other:            Physical Exam    Airway        Mallampati: II   TM distance: > 3 FB   Neck ROM: full   Mouth opening: > 3 cm    Respiratory Devices and Support         Dental       (+) Minor Abnormalities - some fillings, tiny chips      Cardiovascular   cardiovascular exam normal          Pulmonary   pulmonary exam normal                OUTSIDE LABS:  CBC:   Lab Results   Component Value Date    WBC 7.8 02/20/2024    WBC 6.7 05/28/2020    HGB 10.0 (L) 02/20/2024    HGB 12.4 05/28/2020    HCT 33.7 (L) 02/20/2024    HCT 38.3 05/28/2020     02/20/2024     05/28/2020     BMP:   Lab Results   Component Value Date     02/20/2024     05/28/2020    POTASSIUM 4.6 02/20/2024    POTASSIUM 3.8 05/28/2020    CHLORIDE 103 02/20/2024    CHLORIDE 107 05/28/2020    CO2 24 02/20/2024    CO2 26 05/28/2020    BUN  "11.0 02/20/2024    BUN 7 05/28/2020    CR 0.77 02/20/2024    CR 0.61 05/28/2020    GLC 88 02/20/2024     (H) 05/28/2020     COAGS: No results found for: \"PTT\", \"INR\", \"FIBR\"  POC:   Lab Results   Component Value Date    HCG Negative 03/16/2018     HEPATIC:   Lab Results   Component Value Date    ALBUMIN 3.3 (L) 05/28/2020    PROTTOTAL 7.0 05/28/2020    ALT 27 05/28/2020    AST 19 05/28/2020    ALKPHOS 56 05/28/2020    BILITOTAL 0.2 05/28/2020     OTHER:   Lab Results   Component Value Date    AYUSH 9.0 02/20/2024    AMYLASE 89 11/17/2014    TSH 2.00 05/28/2020       Anesthesia Plan    ASA Status:  2    NPO Status:  NPO Appropriate    Anesthesia Type: General.     - Airway: ETT   Induction: Intravenous, Propofol.   Maintenance: Balanced.        Consents    Anesthesia Plan(s) and associated risks, benefits, and realistic alternatives discussed. Questions answered and patient/representative(s) expressed understanding.     - Discussed:     - Discussed with:  Patient            Postoperative Care    Pain management: IV analgesics.   PONV prophylaxis: Ondansetron (or other 5HT-3), Background Propofol Infusion     Comments:               Cheyenne Robb MD    I have reviewed the pertinent notes and labs in the chart from the past 30 days and (re)examined the patient.  Any updates or changes from those notes are reflected in this note.              # Obesity: Estimated body mass index is 30.29 kg/m  as calculated from the following:    Height as of 2/20/24: 1.575 m (5' 2\").    Weight as of 2/20/24: 75.1 kg (165 lb 9.6 oz).      "

## 2024-03-13 NOTE — BRIEF OP NOTE
[facility]    Brief Operative Note    Pre-operative diagnosis: Abnormal uterine bleeding (AUB) [N93.9]  Post-operative diagnosis Same as pre-operative diagnosis    Procedure: LAPAROSCOPY-ASSISTED VAGINAL HYSTERECTOMY AND BILATERAL SALPINGECTOMY, N/A - Abdomen  Cystoscopy, N/A - Urethra  FEMALE GENITAL INFIBULATION TAKE DOWN, N/A - Vulva    Surgeon: Surgeon(s) and Role:     * Shelly Diaz MD - Primary     * Terese Lang MD - Assisting  Anesthesia: General   Estimated Blood Loss: 100 ml  Drains: Nicole  Specimens:   ID Type Source Tests Collected by Time Destination   1 : UTERUS, CERVIX, AND BILATERAL FALLOPIAN TUBES Tissue Uterus, Cervix, Bilateral Fallopian Tubes SURGICAL PATHOLOGY EXAM Shelly Diaz MD 3/13/2024  2:19 PM      Findings:   Small subserosal fibroid normal appearing bilateral fallopian tubes and ovaries. Bilateral ureteral jets seen during cystoscopy  .  Complications: None.  Implants: none

## 2024-03-13 NOTE — OP NOTE
Date of Procedure: 2024     Preoperative Diagnosis:Abnormal Uterine bleeding with Anemia due to Chronic Blood loss.     Postoperative Diagnosis: Same as Preoperative Diagnosis including uterine fibroid    Procedure Performed: Laparoscopic assisted vaginal hysterectomy bilateral salpingectomy diagnostic cystoscopy female genital fibrillation taken down.    Primary Surgeon: Dr. Shelly Diaz    Assistant Surgeon: Dr. Terese Lang was needed to assist due to the patient's history of prior surgery and potential for scar tissue she assisted with visualization and retraction    Anesthesia Received: General    Estimated Blood Loss: 100 ml    Specimens: Uterus cervix segments of bilateral fallopian tubes    Operative Findings: Female genital infibulation present just above the urethra.  10-week size uterus with a subserosal fibroid seen normal-appearing bilateral ovaries with segments of bilateral fallopian tubes present attached to ovaries bilateral ureteral jets seen on cystoscopy    Complications: None apparent at time of procedure    Indication: The patient is a 48-year-old  2 para 2 with a history of female circumcision and also history of abnormal uterine bleeding.  She had had previous management with medicines and also it had an endometrial ablation she continued to experience heavy bleeding and had anemia and desired definitive treatment.  She had a history of HPV and opted for a total hysterectomy to reduce her risk of cervical dysplasia in the future.    Implants: none    Procedure in Detail: The patient was taken to the operating room she received Ancef infection prophylaxis she was placed under general anesthesia she was prepped and draped in usual sterile fashion at the front time was completed a latex free Nicole was placed to the bladder bivalve speculum placed in the vaginal vault cervix was visualized and injected with dilute vasopressin as well as with dilute Marcaine uterine  manipulator was then placed speculum was removed sterile gloves were placed and attention was turned to the abdominal cavity.  At Talbot's point the abdomen was entered into using a Visiport technique no intra-abdominal adhesions were readily noted the patient was placed in Trendelenburg position with the uterus bilateral fallopian tube segments seen bilateral ovaries seen at this point bilateral lower quadrant port sites were placed using the LigaSure the left round ligament was identified was coagulated and cut the bladder flap was developed anteriorly towards the right side this was then completed on the right side on the left side the left utero-ovarian ligament was coagulated and cut as well the left uterine vessel was skeletonized and was coagulated attention was turned to the right side with the right utero-ovarian ligament was coagulated and cut the right uterine vessel was identified skeletonized and coagulated and cut this was taken down to the cervicovaginal junction on the left side the left uterine vessel was coagulated and cut and this was taken down to the cervicovaginal junction at this point the abdomen was desufflated and attention was turned to the perineum the cervix was grasped anteriorly and posteriorly with tenaculums and local anesthetic was injected to cervicovaginal junction send circumferential incision was made with retraction of the vaginal mucosa the posterior cul-de-sac was entered into the vaginal cuff was sutured for hemostasis anteriorly the cul-de-sac was entered into as well at this point the LigaSure impact was used to complete the colpotomy with removal of the specimen the vaginal cuff was closed with 2-0 Vicryl in a running locked fashion.  At this point the tubulation takedown was performed.he skin overlying the region between the clitoris and urethra was injected with local anesthetic the skin was then undermined and dissected cephalad to the clitoral buckner the excess skin was  dissected from the underlying scar tissue and removed in the midline the residual edges were then plicated on each side to prevent reagglutination good hemostasis new scrubs and gloves were placed to return to the abdomen.  Upon repeat insufflation the patient was placed in Trendelenburg position the vaginal cuff was examined was irrigated and with just surface bleeding noted Surgicel powder was applied with good hemostasis the abdomen was desufflated with no excessive bleeding noted at this point all port sites were then removed and the skin sites were closed with 4 Monocryl.  Attention was turned back to the perineum where the cystoscope was introduced into the bladder the bladder dome was identified no injury was noted the bilateral ureteral orifices were seen with good amounts of urine coming from each orifice.  At this point a new Nicole catheter was placed.  All instrument counts noted to be correct the patient was taken recovery in stable condition    Shelly Diaz MD

## 2024-03-13 NOTE — ANESTHESIA CARE TRANSFER NOTE
Patient: Felicia Bermudez    Procedure: Procedure(s):  LAPAROSCOPY-ASSISTED VAGINAL HYSTERECTOMY AND BILATERAL SALPINGECTOMY  Cystoscopy  FEMALE GENITAL INFIBULATION TAKE DOWN       Diagnosis: Abnormal uterine bleeding (AUB) [N93.9]  Diagnosis Additional Information: No value filed.    Anesthesia Type:   General     Note:    Oropharynx: oral airway in place and spontaneously breathing  Level of Consciousness: drowsy  Oxygen Supplementation: face mask  Level of Supplemental Oxygen (L/min / FiO2): 6  Independent Airway: airway patency satisfactory and stable  Dentition: dentition unchanged  Vital Signs Stable: post-procedure vital signs reviewed and stable  Report to RN Given: handoff report given  Patient transferred to: PACU  Comments: Patient comfortable  Handoff Report: Identifed the Patient, Identified the Reponsible Provider, Reviewed the pertinent medical history, Discussed the surgical course, Reviewed Intra-OP anesthesia mangement and issues during anesthesia, Set expectations for post-procedure period and Allowed opportunity for questions and acknowledgement of understanding      Vitals:  Vitals Value Taken Time   BP     Temp     Pulse 96 03/13/24 1554   Resp 13 03/13/24 1554   SpO2 100 % 03/13/24 1554   Vitals shown include unfiled device data.    Electronically Signed By: LANE Hernandez CRNA  March 13, 2024  3:55 PM

## 2024-03-13 NOTE — ANESTHESIA PROCEDURE NOTES
Airway       Patient location during procedure: OR ([facility] - Operating Room or Procedural Area)       Procedure Start/Stop Times: 3/13/2024 1:07 PM  Staff -        Anesthesiologist:  Cheyenne Robb MD       CRNA: Mateus Hyatt APRN CRNA       Performed By: CRNAIndications and Patient Condition       Indications for airway management: ishaan-procedural       Induction type:intravenous       Mask difficulty assessment: 1 - vent by mask    Final Airway Details       Final airway type: endotracheal airway       Successful airway: ETT - single  Endotracheal Airway Details        ETT size (mm): 7.0       Cuffed: yes       Cuff volume (mL): 8       Successful intubation technique: direct laryngoscopy       DL Blade Type: Clemens 2       Grade View of Cords: 1       Adjucts: stylet       Position: Right       Measured from: lips       Secured at (cm): 21       Bite block used: None    Post intubation assessment        Number of attempts at approach: 1       Number of other approaches attempted: 0       Secured with: tape       Ease of procedure: easy       Dentition: Intact and Unchanged    Medication(s) Administered   Medication Administration Time: 3/13/2024 1:07 PM

## 2024-03-14 ENCOUNTER — TELEPHONE (OUTPATIENT)
Dept: OBGYN | Facility: CLINIC | Age: 49
End: 2024-03-14
Payer: COMMERCIAL

## 2024-03-14 VITALS
RESPIRATION RATE: 18 BRPM | TEMPERATURE: 98.4 F | SYSTOLIC BLOOD PRESSURE: 102 MMHG | HEART RATE: 71 BPM | OXYGEN SATURATION: 100 % | WEIGHT: 160.6 LBS | HEIGHT: 63 IN | BODY MASS INDEX: 28.46 KG/M2 | DIASTOLIC BLOOD PRESSURE: 59 MMHG

## 2024-03-14 LAB
ANION GAP SERPL CALCULATED.3IONS-SCNC: 7 MMOL/L (ref 7–15)
BASOPHILS # BLD AUTO: 0 10E3/UL (ref 0–0.2)
BASOPHILS NFR BLD AUTO: 0 %
BUN SERPL-MCNC: 5.9 MG/DL (ref 6–20)
CALCIUM SERPL-MCNC: 8.3 MG/DL (ref 8.6–10)
CHLORIDE SERPL-SCNC: 109 MMOL/L (ref 98–107)
CREAT SERPL-MCNC: 0.64 MG/DL (ref 0.51–0.95)
DEPRECATED HCO3 PLAS-SCNC: 24 MMOL/L (ref 22–29)
EGFRCR SERPLBLD CKD-EPI 2021: >90 ML/MIN/1.73M2
EOSINOPHIL # BLD AUTO: 0 10E3/UL (ref 0–0.7)
EOSINOPHIL NFR BLD AUTO: 0 %
ERYTHROCYTE [DISTWIDTH] IN BLOOD BY AUTOMATED COUNT: 16.8 % (ref 10–15)
FASTING STATUS PATIENT QL REPORTED: NO
GLUCOSE SERPL-MCNC: 105 MG/DL (ref 70–99)
GLUCOSE SERPL-MCNC: 105 MG/DL (ref 70–99)
HCT VFR BLD AUTO: 27.5 % (ref 35–47)
HGB BLD-MCNC: 8.4 G/DL (ref 11.7–15.7)
IMM GRANULOCYTES # BLD: 0 10E3/UL
IMM GRANULOCYTES NFR BLD: 0 %
LYMPHOCYTES # BLD AUTO: 2.8 10E3/UL (ref 0.8–5.3)
LYMPHOCYTES NFR BLD AUTO: 29 %
MCH RBC QN AUTO: 21.4 PG (ref 26.5–33)
MCHC RBC AUTO-ENTMCNC: 30.5 G/DL (ref 31.5–36.5)
MCV RBC AUTO: 70 FL (ref 78–100)
MONOCYTES # BLD AUTO: 0.8 10E3/UL (ref 0–1.3)
MONOCYTES NFR BLD AUTO: 8 %
NEUTROPHILS # BLD AUTO: 5.9 10E3/UL (ref 1.6–8.3)
NEUTROPHILS NFR BLD AUTO: 63 %
NRBC # BLD AUTO: 0 10E3/UL
NRBC BLD AUTO-RTO: 0 /100
PLATELET # BLD AUTO: 212 10E3/UL (ref 150–450)
POTASSIUM SERPL-SCNC: 3.7 MMOL/L (ref 3.4–5.3)
RBC # BLD AUTO: 3.93 10E6/UL (ref 3.8–5.2)
SODIUM SERPL-SCNC: 140 MMOL/L (ref 135–145)
WBC # BLD AUTO: 9.5 10E3/UL (ref 4–11)

## 2024-03-14 PROCEDURE — 36415 COLL VENOUS BLD VENIPUNCTURE: CPT | Performed by: OBSTETRICS & GYNECOLOGY

## 2024-03-14 PROCEDURE — 82947 ASSAY GLUCOSE BLOOD QUANT: CPT | Performed by: OBSTETRICS & GYNECOLOGY

## 2024-03-14 PROCEDURE — 250N000013 HC RX MED GY IP 250 OP 250 PS 637: Performed by: OBSTETRICS & GYNECOLOGY

## 2024-03-14 PROCEDURE — 85004 AUTOMATED DIFF WBC COUNT: CPT | Performed by: OBSTETRICS & GYNECOLOGY

## 2024-03-14 PROCEDURE — 80048 BASIC METABOLIC PNL TOTAL CA: CPT | Performed by: OBSTETRICS & GYNECOLOGY

## 2024-03-14 RX ORDER — NALOXONE HYDROCHLORIDE 0.4 MG/ML
0.2 INJECTION, SOLUTION INTRAMUSCULAR; INTRAVENOUS; SUBCUTANEOUS
Status: DISCONTINUED | OUTPATIENT
Start: 2024-03-14 | End: 2024-03-14 | Stop reason: HOSPADM

## 2024-03-14 RX ORDER — NALOXONE HYDROCHLORIDE 0.4 MG/ML
0.4 INJECTION, SOLUTION INTRAMUSCULAR; INTRAVENOUS; SUBCUTANEOUS
Status: DISCONTINUED | OUTPATIENT
Start: 2024-03-14 | End: 2024-03-14 | Stop reason: HOSPADM

## 2024-03-14 RX ORDER — TIOCONAZOLE 6.5 %
25 OINTMENT WITH PREFILLED APPLICATOR VAGINAL DAILY
Qty: 25 EACH | Refills: 0 | Status: SHIPPED | OUTPATIENT
Start: 2024-03-14 | End: 2024-03-20

## 2024-03-14 RX ORDER — CEPHALEXIN 500 MG/1
500 CAPSULE ORAL 2 TIMES DAILY
Qty: 14 CAPSULE | Refills: 0 | Status: SHIPPED | OUTPATIENT
Start: 2024-03-14 | End: 2024-03-21

## 2024-03-14 RX ADMIN — SIMETHICONE 80 MG: 80 TABLET, CHEWABLE ORAL at 02:44

## 2024-03-14 RX ADMIN — IBUPROFEN 600 MG: 600 TABLET ORAL at 01:25

## 2024-03-14 RX ADMIN — CETIRIZINE HYDROCHLORIDE 10 MG: 10 TABLET, FILM COATED ORAL at 09:03

## 2024-03-14 RX ADMIN — FAMOTIDINE 20 MG: 20 TABLET ORAL at 09:04

## 2024-03-14 ASSESSMENT — ACTIVITIES OF DAILY LIVING (ADL)
ADLS_ACUITY_SCORE: 21
ADLS_ACUITY_SCORE: 20
ADLS_ACUITY_SCORE: 20
ADLS_ACUITY_SCORE: 21
ADLS_ACUITY_SCORE: 21
ADLS_ACUITY_SCORE: 20
ADLS_ACUITY_SCORE: 20
ADLS_ACUITY_SCORE: 21
ADLS_ACUITY_SCORE: 20
ADLS_ACUITY_SCORE: 21
ADLS_ACUITY_SCORE: 21
ADLS_ACUITY_SCORE: 20
ADLS_ACUITY_SCORE: 21

## 2024-03-14 NOTE — PLAN OF CARE
Date & Time: 1900-0700  Surgery/POD#: POD 1 from lap vaginal hysterectomy, BSO, cysto, deinfibulation   Behavior & Aggression: Green   Fall Risk: Yes   Orientation: A&Ox4  ABNL VS/O2:VSS on RA   Pain Management: PRN tylenol and ibuprofen. Simethicone given x2 for gas discomfort   Bowel/Bladder: Nicole in place. No gas or BM   Drains: PIV SL   Diet: Regular diet. Tolerated crackers and a full liquid diet overnight   Activity Level: SBA GB  Anticipated  DC Date: Pending   Significant Information: Ambulated x1 in halls. Denies N/V overnight

## 2024-03-14 NOTE — ANESTHESIA POSTPROCEDURE EVALUATION
Patient: Felicia Bermudez    Procedure: Procedure(s):  LAPAROSCOPY-ASSISTED VAGINAL HYSTERECTOMY AND BILATERAL SALPINGECTOMY  Cystoscopy  FEMALE GENITAL INFIBULATION TAKE DOWN       Anesthesia Type:  General    Note:  Disposition: Admission   Postop Pain Control: Uneventful            Sign Out: Well controlled pain   PONV: No   Neuro/Psych: Uneventful            Sign Out: Acceptable/Baseline neuro status   Airway/Respiratory: Uneventful            Sign Out: Acceptable/Baseline resp. status   CV/Hemodynamics: Uneventful            Sign Out: Acceptable CV status   Other NRE: NONE   DID A NON-ROUTINE EVENT OCCUR? No           Last vitals:  Vitals Value Taken Time   /68 03/13/24 1730   Temp 36.2  C (97.2  F) 03/13/24 1700   Pulse 79 03/13/24 1741   Resp 12 03/13/24 1741   SpO2 98 % 03/13/24 1742   Vitals shown include unfiled device data.    Electronically Signed By: Raul Bro MD  March 13, 2024  11:26 PM

## 2024-03-14 NOTE — PLAN OF CARE
Date & Time: 3-13-24 [id number]  Surgery/POD#: POD 0   Behavior & Aggression: green  Fall Risk: yes  Orientation:AO x4  ABNL VS/O2:VSS on RA  ABNL Labs:   Pain Management:minimal pain, ice packs used  Bowel/Bladder: padilla  Drains: padilla  Diet:Awaiting MD to enter diet order  Activity Level: Not OOB yet  Tests/Procedures: POD 0 Lap vaginal hysterectomy BSO, Cysto, deinfibulation.    Anticipated  DC Date: pending progress  Significant Information: peripad in place    CO gas discomfort.  Capno on.  Tolerating ice chips and sips of water.  Awaiting MD to enter diet order.  Daughter at bedside and supportive of patient.

## 2024-03-14 NOTE — PLAN OF CARE
Goal Outcome Evaluation:  LAPAROSCOPY-ASSISTED VAGINAL HYSTERECTOMY AND BILATERAL SALPINGECTOMY   Cystoscopy          Date & Time: 03//14/[id number]   Surgery/POD#: 2   Behavior & Aggression: green, calm positive     Fall Risk: no     Orientation:a/o calm/cooperative, denies general pain short of breath, chest tightness, N/V  and abdominal discomfort.  ABNL VS/O2: vs's on R A   ABNL Labs: none   Pain Management: tylenol, reports no pain  Bowel/Bladder: Continent voided 3 times  the padilla catheter removed 1,500 ml  Drains: no iv access due to discharge   Diet: Regular   Activity Level: Independent   Tests/Procedures: Laparoscopic assisted vaginal hysterectomy bilateral salpingectomy diagnostic cystoscopy female genital fibrillation taken down.   Anticipated  DC Date: discharging home with her children.  Significant Information:

## 2024-03-14 NOTE — TELEPHONE ENCOUNTER
[facility] Call Center    Phone Message    May a detailed message be left on voicemail: yes     Reason for Call: Other: . Pt is calling to schedule her 1 week post op for next week, pt prefers afternoon, writer was unable to find anything until May, please call pt, thank you!    Action Taken: Message routed to:  Other: obgyn    Travel Screening: Not Applicable

## 2024-03-14 NOTE — PROGRESS NOTES
MD Notification    Notified Person: MD    Notified Person Name: Dr. Carolyn Lara     Notification Date/Time: 8:01 PM     Notification Interaction: Telephone with readback     Purpose of Notification: Requesting pt's diet and patient having gas pain, can we order PRN simethicone?     Orders Received: Regular diet and PRN simethicone     Comments:

## 2024-03-14 NOTE — PROGRESS NOTES
POD#1      Subjective: doing well. Denies pain. Using only tylenol. Tolerating meals without nausea. Padilla is still in place.      Vitals:    03/13/24 1942 03/13/24 2306 03/14/24 0246 03/14/24 0741   BP: 130/66 (!) 143/79 112/64 121/69   BP Location: Left arm Left arm Left arm    Patient Position:  Semi-Mast's Semi-Mast's    Cuff Size:  Adult Regular Adult Regular    Pulse: 84 77 70 75   Resp: 16 16  16   Temp: 98.9  F (37.2  C) 98.2  F (36.8  C)  98.7  F (37.1  C)   TempSrc: Oral Oral  Oral   SpO2:  100%  99%   Weight:       Height:            GEN: NAD  GI: soft, non-distended. Non-tender to palpation. Incision sites are clean dry and intact.      A/P   POD#1 from [facility], diagnostic cystoscopy and female infibulation take down.    --Awaiting AM hemoglobin  --Advance cares and remove padilla  --Discussed wound care of vulva. Will also send home on cephalexin for infection prophylaxis of vulva. Use vaseline gauze on the area daily to prevent re-agglutination    Anticipate discharge to home pending normal hemoglobin and ability to void. Follow up with me in one week for infibulation take down exam    Shelly Diaz MD

## 2024-03-16 PROCEDURE — 88307 TISSUE EXAM BY PATHOLOGIST: CPT | Mod: 26 | Performed by: PATHOLOGY

## 2024-03-19 NOTE — PROGRESS NOTES
SUBJECTIVE:                                                   Felicia Bermudez is a 48 year old female who presents to clinic today for the following health issue(s):  Patient presents with:  Surgical Followup    Procedure 24: LAPAROSCOPY-ASSISTED VAGINAL HYSTERECTOMY AND BILATERAL SALPINGECTOMY; FEMALE GENITAL INFIBULATION TAKE DOWN;     HPI:  Felicia presents for a short interval follow up to ensure no infection at the infibulation take down site. She is doing well. She is tolerating meals without nausea. She is having normal bowel movements. Only taking tylenol. Reports itching at the take down site. No vaginal bleeding.     Patient's last menstrual period was 2024 (exact date)..   Patient is sexually active, .  Using hysterectomy for contraception.    reports that she has never smoked. She has never been exposed to tobacco smoke. She has never used smokeless tobacco.  Health maintenance reviewed.     Today's PHQ-2 Score:       2024     4:01 PM   PHQ-2 (  [employer])   Q1: Little interest or pleasure in doing things 0   Q2: Feeling down, depressed or hopeless 0   PHQ-2 Score 0   Q1: Little interest or pleasure in doing things Not at all   Q2: Feeling down, depressed or hopeless Not at all   PHQ-2 Score 0       Problem list and histories reviewed & adjusted, as indicated.  Additional history: as documented.    Patient Active Problem List   Diagnosis    Urticaria    Multiple food allergies    PUD (peptic ulcer disease)    High risk HPV infection    Psychophysiological insomnia    Menorrhagia    Anxiety    Vitamin D deficiency    Migraine without status migrainosus, not intractable    Chronic fatigue syndrome    Menopausal and postmenopausal disorder    Iron deficiency anemia    S/P hysterectomy     Past Surgical History:   Procedure Laterality Date    COLONOSCOPY N/A 2023    Procedure: Colonoscopy;  Surgeon: Page Neal MD;  Location:  GI    CYSTOSCOPY N/A 3/13/2024     Procedure: Cystoscopy;  Surgeon: Shelly Diaz MD;  Location:  OR    DILATION AND CURETTAGE, HYSTEROSCOPY, ABLATE ENDOMETRIUM NOVASURE, COMBINED N/A 3/16/2018    Procedure: COMBINED DILATION AND CURETTAGE, HYSTEROSCOPY, ABLATE ENDOMETRIUM NOVASURE;  Hysteroscopy, Dilation and Curettage, unable to perform Endometrial Ablation with Novasure secondary to uterine perforation, diagnostic laparoscopy, Bilateral Laparoscopic Tubal Ligation ;  Surgeon: Danilo White MD;  Location: [facility] OR    EYE SURGERY      REmoval of mass left eye    LAPAROSCOPIC ASSISTED HYSTERECTOMY VAGINAL N/A 3/13/2024    Procedure: LAPAROSCOPY-ASSISTED VAGINAL HYSTERECTOMY AND BILATERAL SALPINGECTOMY;  Surgeon: Shelly Diaz MD;  Location:  OR    LAPAROSCOPIC TUBAL LIGATION Bilateral 3/16/2018    Procedure: LAPAROSCOPIC TUBAL LIGATION;;  Surgeon: Danilo White MD;  Location: [facility] OR    LAPAROSCOPY DIAGNOSTIC (GYN)      REVISE CIRCUMCISION FEMALE N/A 3/13/2024    Procedure: FEMALE GENITAL INFIBULATION TAKE DOWN;  Surgeon: Shelly Diaz MD;  Location:  OR    SURGICAL HISTORY OF -       2010 turbinate surgery      Social History     Tobacco Use    Smoking status: Never     Passive exposure: Never    Smokeless tobacco: Never   Substance Use Topics    Alcohol use: No     Alcohol/week: 0.0 standard drinks of alcohol      Problem (# of Occurrences) Relation (Name,Age of Onset)    Diabetes (2) Father, Paternal Grandmother    Hypertension (1) Father    Neurologic Disorder (1) Mother (57): brain anuerysm    Polycythemia (1) Daughter    Other Cancer (1) Father (70): Testicle Cancer    Other - See Comments (1) Daughter (21): rare blood disorders              Current Outpatient Medications   Medication Sig    acetaminophen (TYLENOL) 325 MG tablet Take 2 tablets (650 mg) by mouth every 4 hours as needed for other (mild pain)    cephALEXin (KEFLEX) 500 MG capsule Take 1 capsule (500 mg) by mouth 2 times  "daily for 7 days    oxymetazoline (AFRIN) 0.05 % nasal spray Spray 2 sprays into both nostrils 2 times daily    senna-docusate (SENOKOT-S/PERICOLACE) 8.6-50 MG tablet Take 1-2 tablets by mouth 2 times daily Take while on oral narcotics to prevent or treat constipation.    ALPRAZolam (XANAX) 0.5 MG tablet Take 1 tablet (0.5 mg) by mouth 2 times daily as needed for anxiety (Patient not taking: Reported on 3/20/2024)    cetirizine (ZYRTEC) 10 MG tablet Take 10 mg by mouth 2 times daily    hydrOXYzine HCl (ATARAX) 25 MG tablet Take 1 tablet (25 mg) by mouth every 6 hours as needed for itching or anxiety (with pain, moderate pain) (Patient not taking: Reported on 3/20/2024)     No current facility-administered medications for this visit.     Allergies   Allergen Reactions    Bactrim [Sulfamethoxazole-Trimethoprim] Swelling     Tongue swelling    Cucumber Extract Shortness Of Breath and Other (See Comments)     Tongue swelling    Effexor [Venlafaxine] Hives, Rash and Blisters    Amoxicillin Swelling     Tongue swelling    Doxycycline Hives and Itching    Iodine Hives    Latex Hives     Lip swelling    Sulfa Antibiotics Hives    Watermelon [Citrullus Vulgaris] Hives     itching    Zolpidem Unknown    Zolpidem Tartrate Swelling     Tongue swelling    Amoxicillin-Pot Clavulanate Rash     Tongue swelling    Eszopiclone Swelling and Rash     Tongue swelling    Iodinated Contrast Media Rash       ROS:    No urinary frequency or dysuria, bladder or kidney problems      OBJECTIVE:     /62   Ht 1.6 m (5' 3\")   Wt 72.6 kg (160 lb)   LMP 02/16/2024 (Exact Date)   BMI 28.34 kg/m    Body mass index is 28.34 kg/m .    Exam:  Constitutional:  Appearance: Well nourished, well developed alert, in no acute distress  GI: incision sites covered X 3. Removed bandaid and steris. No erythema and no signs of infection. Right side was denuded with bandaid but was only pinpoint.  Skin: General Inspection:  No rashes present, no lesions " present, no areas of discoloration.  Neurologic:  Mental Status:  Oriented X3.  Normal strength and tone, sensory exam grossly normal, mentation intact and speech normal.    Psychiatric:  Mentation appears normal and affect normal/bright.   External Genitalia: healing really well with no reagglutination. Labia is healing well. No purulence. Suture knots removed. Bacitracin gauze applied        ASSESSMENT/PLAN:                                                        ICD-10-CM    1. Female genital infibulation  N90.813       2. S/P hysterectomy  Z90.710 CBC with platelets        Felicia is doing an excellent job caring for her incisions. I recommend continued diligence. Anticipate only needing to use the vaseline or bacitracin gauze for one more week.  No post-hysterectomy complications suspected. Will repeat CBC to ensure it is trending in the right direction in regards to her anemia.    Shelly Diaz MD  [facility] [place]

## 2024-03-20 ENCOUNTER — OFFICE VISIT (OUTPATIENT)
Dept: OBGYN | Facility: CLINIC | Age: 49
End: 2024-03-20
Payer: COMMERCIAL

## 2024-03-20 VITALS
WEIGHT: 160 LBS | HEIGHT: 63 IN | DIASTOLIC BLOOD PRESSURE: 62 MMHG | SYSTOLIC BLOOD PRESSURE: 112 MMHG | BODY MASS INDEX: 28.35 KG/M2

## 2024-03-20 DIAGNOSIS — Z90.710 S/P HYSTERECTOMY: ICD-10-CM

## 2024-03-20 DIAGNOSIS — N90.813 FEMALE GENITAL INFIBULATION: Primary | ICD-10-CM

## 2024-03-20 LAB
ERYTHROCYTE [DISTWIDTH] IN BLOOD BY AUTOMATED COUNT: 16.6 % (ref 10–15)
HCT VFR BLD AUTO: 33.3 % (ref 35–47)
HGB BLD-MCNC: 9.8 G/DL (ref 11.7–15.7)
MCH RBC QN AUTO: 21.1 PG (ref 26.5–33)
MCHC RBC AUTO-ENTMCNC: 29.4 G/DL (ref 31.5–36.5)
MCV RBC AUTO: 72 FL (ref 78–100)
PLATELET # BLD AUTO: 291 10E3/UL (ref 150–450)
RBC # BLD AUTO: 4.65 10E6/UL (ref 3.8–5.2)
WBC # BLD AUTO: 7 10E3/UL (ref 4–11)

## 2024-03-20 PROCEDURE — 36415 COLL VENOUS BLD VENIPUNCTURE: CPT | Performed by: OBSTETRICS & GYNECOLOGY

## 2024-03-20 PROCEDURE — 85027 COMPLETE CBC AUTOMATED: CPT | Performed by: OBSTETRICS & GYNECOLOGY

## 2024-03-20 PROCEDURE — 99024 POSTOP FOLLOW-UP VISIT: CPT | Performed by: OBSTETRICS & GYNECOLOGY

## 2024-03-27 ENCOUNTER — MYC MEDICAL ADVICE (OUTPATIENT)
Dept: OBGYN | Facility: CLINIC | Age: 49
End: 2024-03-27
Payer: COMMERCIAL

## 2024-03-27 DIAGNOSIS — M62.838 MUSCLE SPASM: Primary | ICD-10-CM

## 2024-03-27 NOTE — TELEPHONE ENCOUNTER
3/14/24 Laparoscopic assisted vaginal hysterectomy bilateral salpingectomy diagnostic cystoscopy female genital fibrillation taken down.   3/20/24  Felicia is doing an excellent job caring for her incisions. I recommend continued diligence. Anticipate only needing to use the vaseline or bacitracin gauze for one more week.  No post-hysterectomy complications suspected. Will repeat CBC to ensure it is trending in the right direction in regards to her anemia.   Shelly Diaz MD    Routing pt [employer] message to provider to advise.    Sangeetha Lomas RN on 3/27/2024 at 4:33 PM

## 2024-03-28 RX ORDER — CYCLOBENZAPRINE HCL 10 MG
10 TABLET ORAL 3 TIMES DAILY PRN
Qty: 60 TABLET | Refills: 1 | Status: SHIPPED | OUTPATIENT
Start: 2024-03-28

## 2024-04-05 ENCOUNTER — HOSPITAL ENCOUNTER (OUTPATIENT)
Dept: MAMMOGRAPHY | Facility: CLINIC | Age: 49
Discharge: HOME | End: 2024-04-05
Attending: INTERNAL MEDICINE | Admitting: INTERNAL MEDICINE
Payer: COMMERCIAL

## 2024-04-05 DIAGNOSIS — Z12.31 VISIT FOR SCREENING MAMMOGRAM: ICD-10-CM

## 2024-04-05 PROCEDURE — 77063 BREAST TOMOSYNTHESIS BI: CPT

## 2024-04-09 ENCOUNTER — VIRTUAL VISIT (OUTPATIENT)
Dept: PEDIATRICS | Facility: CLINIC | Age: 49
End: 2024-04-09
Payer: COMMERCIAL

## 2024-04-09 DIAGNOSIS — L50.8 CHRONIC URTICARIA: Primary | ICD-10-CM

## 2024-04-09 PROCEDURE — 99213 OFFICE O/P EST LOW 20 MIN: CPT | Mod: 95 | Performed by: INTERNAL MEDICINE

## 2024-04-09 NOTE — PATIENT INSTRUCTIONS
Discuss with [facility] whether or not the venlafaxine was the cause of your urticaria. call [phone]     In meantime, stay off of the venlafaxine.

## 2024-04-09 NOTE — PROGRESS NOTES
Felicia is a 48 year old who is being evaluated via a billable video visit.          Assessment & Plan     Chronic urticaria  Uncertain if due to effexor or not.  Will see if allergist can help sort out, as this med seemed to help immensely with respect to her ongoing migraines; no others have been helpful.      - Adult Allergy/Asthma  Referral; Future              See Patient Instructions    Subjective   Felicia is a 48 year old, presenting for the following health issues:  No chief complaint on file.    HPI     Had some issues with venlafaxine for migraines.  Developed a bad rash. So stopped the effexor.  Seen by dermatology, and diagnosed with urticaria.  Was told to stop the effexor and begin Zyrtec (cetirizine) and protopic.  Was referred to otolaryngologist but needs an otolaryngologist referral.      Has ongoing face bumps:  was told to make appointment with  otolaryngologist/Allergy,  needs referral.    Seen by MH: was initially put on prozac, then changed to as needed xanax.              Review of Systems  Constitutional, neuro, ENT, endocrine, pulmonary, cardiac, gastrointestinal, genitourinary, musculoskeletal, integument and psychiatric systems are negative, except as otherwise noted.      Objective           Vitals:  No vitals were obtained today due to virtual visit.    Physical Exam   GENERAL: alert and no distress  EYES: Eyes grossly normal to inspection.  No discharge or erythema, or obvious scleral/conjunctival abnormalities.  RESP: No audible wheeze, cough, or visible cyanosis.    SKIN: Visible skin clear. No significant rash, abnormal pigmentation or lesions.  NEURO: Cranial nerves grossly intact.  Mentation and speech appropriate for age.  PSYCH: Appropriate affect, tone, and pace of words          Video-Visit Details    Type of service:  Video Visit   Originating Location (pt. Location): Home    Distant Location (provider location):  On-site  Platform used for Video Visit:  Doximity  Signed Electronically by: Jose Daniel Arredondo MD

## 2024-04-10 ENCOUNTER — TELEPHONE (OUTPATIENT)
Dept: OBGYN | Facility: CLINIC | Age: 49
End: 2024-04-10
Payer: COMMERCIAL

## 2024-04-10 ENCOUNTER — MYC MEDICAL ADVICE (OUTPATIENT)
Dept: OBGYN | Facility: CLINIC | Age: 49
End: 2024-04-10
Payer: COMMERCIAL

## 2024-04-10 NOTE — TELEPHONE ENCOUNTER
[facility] Call Center    Phone Message    May a detailed message be left on voicemail: yes     Reason for Call: Other: Pt called to schedule medication phone call with Dr. Diaz. The soonest appt was 6/6. Pt scheduled it but was wondering if there was anything sooner. Please contact pt.      Action Taken: Message routed to:  Other: [facility]    Travel Screening: Not Applicable

## 2024-04-22 NOTE — PROGRESS NOTES
SUBJECTIVE:                                                   Felicia Bermudez is a 48 year old female who presents to clinic today for the following health issue(s):  Patient presents with:  Post-op Visit: Procedure 3/13/24: Laparoscopic assisted vaginal hysterectomy bilateral salpingectomy diagnostic cystoscopy female genital fibrillation taken down.    Additional Information: C/o dull achy pain in lower right abdomen, incisions are healing well. Some discomfort in vulva. Also reporting tenderness and itching in left breast since last week.    HPI:  Felicia presents one month after surgery. She states that she feels better and feels more like a woman. Prior to surgery she has been having bilateral breast pain with the left worse than the right. She also has right sided lower pelvic aching. The pain was present prior to surgery and would happen before her menses.   She otherwise is not having hot flushes but endorses brain fog and fatigue. She tried pellets in the past but her skin pushed them out. She did not have insurance at the time and was not able to get the pills and patches. She is open to HRT now.    Patient's last menstrual period was 2024 (exact date).    Patient is not sexually active, .  Using not sexually active for contraception.    reports that she has never smoked. She has never been exposed to tobacco smoke. She has never used smokeless tobacco.    STD testing offered?  Declined    Health maintenance updated:  yes    Today's PHQ-2 Score:       2024     6:34 PM   PHQ-2 (  [employer])   Q1: Little interest or pleasure in doing things 0   Q2: Feeling down, depressed or hopeless 0   PHQ-2 Score 0   Q1: Little interest or pleasure in doing things Not at all   Q2: Feeling down, depressed or hopeless Not at all   PHQ-2 Score 0     Today's PHQ-9 Score:       2023     3:07 PM   PHQ-9 SCORE   PHQ-9 Total Score MyChart 10 (Moderate depression)   PHQ-9 Total Score 10     Today's MALLORIE-7  Score:       11/2/2023     5:11 PM   MALLORIE-7 SCORE   Total Score 1 (minimal anxiety)   Total Score 1       Problem list and histories reviewed & adjusted, as indicated.  Additional history: as documented.    Patient Active Problem List   Diagnosis    Urticaria    Multiple food allergies    PUD (peptic ulcer disease)    High risk HPV infection    Psychophysiological insomnia    Menorrhagia    Anxiety    Vitamin D deficiency    Migraine without status migrainosus, not intractable    Chronic fatigue syndrome    Menopausal and postmenopausal disorder    Iron deficiency anemia    S/P hysterectomy     Past Surgical History:   Procedure Laterality Date    COLONOSCOPY N/A 5/1/2023    Procedure: Colonoscopy;  Surgeon: Page Neal MD;  Location:  GI    CYSTOSCOPY N/A 3/13/2024    Procedure: Cystoscopy;  Surgeon: Shelly Diaz MD;  Location:  OR    DILATION AND CURETTAGE, HYSTEROSCOPY, ABLATE ENDOMETRIUM NOVASURE, COMBINED N/A 3/16/2018    Procedure: COMBINED DILATION AND CURETTAGE, HYSTEROSCOPY, ABLATE ENDOMETRIUM NOVASURE;  Hysteroscopy, Dilation and Curettage, unable to perform Endometrial Ablation with Novasure secondary to uterine perforation, diagnostic laparoscopy, Bilateral Laparoscopic Tubal Ligation ;  Surgeon: Danilo White MD;  Location:  OR    EYE SURGERY      REmoval of mass left eye    LAPAROSCOPIC ASSISTED HYSTERECTOMY VAGINAL N/A 3/13/2024    Procedure: LAPAROSCOPY-ASSISTED VAGINAL HYSTERECTOMY AND BILATERAL SALPINGECTOMY;  Surgeon: Shelly Diaz MD;  Location:  OR    LAPAROSCOPIC TUBAL LIGATION Bilateral 3/16/2018    Procedure: LAPAROSCOPIC TUBAL LIGATION;;  Surgeon: Danilo White MD;  Location:  OR    LAPAROSCOPY DIAGNOSTIC (GYN)      REVISE CIRCUMCISION FEMALE N/A 3/13/2024    Procedure: FEMALE GENITAL INFIBULATION TAKE DOWN;  Surgeon: Shelly Diaz MD;  Location:  OR    SURGICAL HISTORY OF -       2010 turbinate surgery       Social History     Tobacco Use    Smoking status: Never     Passive exposure: Never    Smokeless tobacco: Never   Substance Use Topics    Alcohol use: No     Alcohol/week: 0.0 standard drinks of alcohol      Problem (# of Occurrences) Relation (Name,Age of Onset)    Diabetes (2) Father, Paternal Grandmother    Hypertension (1) Father    Neurologic Disorder (1) Mother (57): brain anuerysm    Polycythemia (1) Daughter    Other Cancer (1) Father (70): Testicle Cancer    Other - See Comments (1) Daughter (21): rare blood disorders              Current Outpatient Medications   Medication Sig Dispense Refill    cetirizine (ZYRTEC) 10 MG tablet Take 10 mg by mouth 2 times daily      cyclobenzaprine (FLEXERIL) 10 MG tablet Take 1 tablet (10 mg) by mouth 3 times daily as needed for muscle spasms 60 tablet 1    estradiol (FEMPATCH) 0.025 MG/24HR weekly patch Place 1 patch onto the skin once a week 4 patch 11    oxymetazoline (AFRIN) 0.05 % nasal spray Spray 2 sprays into both nostrils 2 times daily 37 mL 0    progesterone (PROMETRIUM) 100 MG capsule Take 1 capsule (100 mg) by mouth at bedtime 90 capsule 3    senna-docusate (SENOKOT-S/PERICOLACE) 8.6-50 MG tablet Take 1-2 tablets by mouth 2 times daily Take while on oral narcotics to prevent or treat constipation. 30 tablet 0     No current facility-administered medications for this visit.     Allergies   Allergen Reactions    Bactrim [Sulfamethoxazole-Trimethoprim] Swelling     Tongue swelling    Cucumber Extract Shortness Of Breath and Other (See Comments)     Tongue swelling    Trazodone Hives    Effexor [Venlafaxine] Hives, Rash and Blisters    Amoxicillin Swelling     Tongue swelling    Doxycycline Hives and Itching    Iodine Hives    Latex Hives     Lip swelling    Sulfa Antibiotics Hives    Watermelon [Citrullus Vulgaris] Hives     itching    Zolpidem Unknown    Zolpidem Tartrate Swelling     Tongue swelling    Amoxicillin-Pot Clavulanate Rash     Tongue swelling     "Eszopiclone Swelling and Rash     Tongue swelling    Iodinated Contrast Media Rash       ROS:  No urinary frequency or dysuria, bladder or kidney problems      OBJECTIVE:     /68   Ht 1.6 m (5' 3\")   Wt 73.5 kg (162 lb)   LMP 02/16/2024 (Exact Date)   BMI 28.70 kg/m    Body mass index is 28.7 kg/m .    Exam:  Constitutional:  Appearance: Well nourished, well developed alert, in no acute distress  Breasts:  Inspection of Breasts:  Symmetric bilaterally.  No puckering.  No skin changes.  Palpation of Breasts and Axillae:  No masses present on palpation, diffuse breast tenderness in both breasts. Left worse than the right. Axillary Lymph Nodes:  No lymphadenopathy present  Gastrointestinal:  well healed abdominal incisions  Skin: General Inspection:  No rashes present, no lesions present, no areas of discoloration.  Neurologic:  Mental Status:  Oriented X3.  Normal strength and tone, sensory exam grossly normal, mentation intact and speech normal.    Psychiatric:  Mentation appears normal and affect normal/bright.  External Genitalia: clitoral tissue is visible. Well healed labia.  Vagina: normal. No bleeding. Vaginal cuff intact on palpation and non-tender  Adnexa: right adnexal pain on palpation      ASSESSMENT/PLAN:                                                        ICD-10-CM    1. S/P hysterectomy  Z90.710       2. Pain of both breasts  N64.4 MA Diagnostic Digital Bilateral     US Breast Bilateral Limited 1-3 Quadrants      3. Menopausal symptoms  N95.1 estradiol (FEMPATCH) 0.025 MG/24HR weekly patch     progesterone (PROMETRIUM) 100 MG capsule      4. Pelvic pain in female  R10.2 CANCELED: US Transvaginal Pelvic Non-OB          Healing well post-surgery. No concern for post-op complications. I suspect that her pelvic pain and breast pain are due to her cycle. Recommend a pelvic ultrasound in one month as the pain is not new and tolerable.  We discussed HRT. I recommend transdermal estrogen and oral " progesterone at night. She accepts these.  Although the cyclical breast pain is chronic I recommend diagnostic imaging. A screening mammogram was done earlier this month. Ordered both mammogram and ultrasound but ok to have ultrasound only per Radiology discretion.    Shelly Diaz MD  [facility] [place]

## 2024-04-23 ENCOUNTER — OFFICE VISIT (OUTPATIENT)
Dept: OBGYN | Facility: CLINIC | Age: 49
End: 2024-04-23
Payer: COMMERCIAL

## 2024-04-23 VITALS
SYSTOLIC BLOOD PRESSURE: 118 MMHG | WEIGHT: 162 LBS | DIASTOLIC BLOOD PRESSURE: 68 MMHG | BODY MASS INDEX: 28.7 KG/M2 | HEIGHT: 63 IN

## 2024-04-23 DIAGNOSIS — Z90.710 S/P HYSTERECTOMY: Primary | ICD-10-CM

## 2024-04-23 DIAGNOSIS — N95.1 MENOPAUSAL SYMPTOMS: ICD-10-CM

## 2024-04-23 DIAGNOSIS — N64.4 PAIN OF BOTH BREASTS: ICD-10-CM

## 2024-04-23 DIAGNOSIS — R10.2 PELVIC PAIN IN FEMALE: ICD-10-CM

## 2024-04-23 PROCEDURE — 99214 OFFICE O/P EST MOD 30 MIN: CPT | Mod: 24 | Performed by: OBSTETRICS & GYNECOLOGY

## 2024-04-23 PROCEDURE — 99024 POSTOP FOLLOW-UP VISIT: CPT | Performed by: OBSTETRICS & GYNECOLOGY

## 2024-04-23 RX ORDER — ESTRADIOL 0.03 MG/D
1 PATCH TRANSDERMAL WEEKLY
Qty: 4 PATCH | Refills: 11 | Status: SHIPPED | OUTPATIENT
Start: 2024-04-23 | End: 2024-06-07

## 2024-04-23 RX ORDER — PROGESTERONE 100 MG/1
100 CAPSULE ORAL AT BEDTIME
Qty: 90 CAPSULE | Refills: 3 | Status: SHIPPED | OUTPATIENT
Start: 2024-04-23

## 2024-04-25 ENCOUNTER — HOSPITAL ENCOUNTER (OUTPATIENT)
Dept: ULTRASOUND IMAGING | Facility: CLINIC | Age: 49
Discharge: HOME | End: 2024-04-25
Attending: OBSTETRICS & GYNECOLOGY
Payer: COMMERCIAL

## 2024-04-25 DIAGNOSIS — N64.4 PAIN OF BOTH BREASTS: ICD-10-CM

## 2024-04-25 PROCEDURE — 76642 ULTRASOUND BREAST LIMITED: CPT | Mod: 50

## 2024-06-07 ENCOUNTER — OFFICE VISIT (OUTPATIENT)
Dept: OBGYN | Facility: CLINIC | Age: 49
End: 2024-06-07
Payer: COMMERCIAL

## 2024-06-07 VITALS
BODY MASS INDEX: 29.06 KG/M2 | WEIGHT: 164 LBS | DIASTOLIC BLOOD PRESSURE: 72 MMHG | HEIGHT: 63 IN | SYSTOLIC BLOOD PRESSURE: 114 MMHG

## 2024-06-07 DIAGNOSIS — F43.10 PTSD (POST-TRAUMATIC STRESS DISORDER): ICD-10-CM

## 2024-06-07 DIAGNOSIS — N95.1 PERIMENOPAUSAL SYMPTOMS: Primary | ICD-10-CM

## 2024-06-07 PROCEDURE — 99214 OFFICE O/P EST MOD 30 MIN: CPT | Mod: 24 | Performed by: OBSTETRICS & GYNECOLOGY

## 2024-06-07 RX ORDER — ESTRADIOL 1 MG/1
1 TABLET ORAL EVERY MORNING
Qty: 90 TABLET | Refills: 3 | Status: SHIPPED | OUTPATIENT
Start: 2024-06-07

## 2024-06-07 RX ORDER — MULTIVITAMIN
1 TABLET ORAL DAILY
COMMUNITY

## 2024-06-07 RX ORDER — PRAZOSIN HYDROCHLORIDE 1 MG/1
1 CAPSULE ORAL AT BEDTIME
Qty: 30 CAPSULE | Refills: 11 | Status: SHIPPED | OUTPATIENT
Start: 2024-06-07

## 2024-06-07 NOTE — PROGRESS NOTES
SUBJECTIVE:                                                   Felicia Bermudez is a 48 year old female who presents to clinic today for the following health issue(s):  Patient presents with:  Medication Follow-up: F/u to start of HRT patch and oral progesterone. Reports some improvement with brain fog and insomnia. No hot flashes.      HPI:  Felicia presents for follow up. She still continues to struggle to sleep well at night. This is a chronic issue for which she has been on different medications. Unfortunately she responded to Ambien but developed angioedema from it. She responds to PRN Xanax from another provider but does not want to become dependent on it. She uses it twice a week. She is having significant brain fog that is getting worse due to the poor sleep.  She is not having hot flushes. She is using the transdermal estradiol and only notes skin irritation from it.    Patient's last menstrual period was 2024 (exact date).    Patient is not sexually active, .  Using not sexually active for contraception.    reports that she has never smoked. She has never been exposed to tobacco smoke. She has never used smokeless tobacco.    STD testing offered?  Declined    Health maintenance updated:  yes    Today's PHQ-2 Score:       2024     6:34 PM   PHQ-2 (  [employer])   Q1: Little interest or pleasure in doing things 0   Q2: Feeling down, depressed or hopeless 0   PHQ-2 Score 0   Q1: Little interest or pleasure in doing things Not at all   Q2: Feeling down, depressed or hopeless Not at all   PHQ-2 Score 0     Today's PHQ-9 Score:       2023     3:07 PM   PHQ-9 SCORE   PHQ-9 Total Score MyChart 10 (Moderate depression)   PHQ-9 Total Score 10     Today's MALLORIE-7 Score:       2023     5:11 PM   MALLORIE-7 SCORE   Total Score 1 (minimal anxiety)   Total Score 1       Problem list and histories reviewed & adjusted, as indicated.  Additional history: as documented.    Patient Active Problem List    Diagnosis    Urticaria    Multiple food allergies    PUD (peptic ulcer disease)    High risk HPV infection    Psychophysiological insomnia    Menorrhagia    Anxiety    Vitamin D deficiency    Migraine without status migrainosus, not intractable    Chronic fatigue syndrome    Menopausal and postmenopausal disorder    Iron deficiency anemia    S/P hysterectomy     Past Surgical History:   Procedure Laterality Date    COLONOSCOPY N/A 5/1/2023    Procedure: Colonoscopy;  Surgeon: Page Neal MD;  Location:  GI    CYSTOSCOPY N/A 3/13/2024    Procedure: Cystoscopy;  Surgeon: Shelly Diaz MD;  Location:  OR    DILATION AND CURETTAGE, HYSTEROSCOPY, ABLATE ENDOMETRIUM NOVASURE, COMBINED N/A 3/16/2018    Procedure: COMBINED DILATION AND CURETTAGE, HYSTEROSCOPY, ABLATE ENDOMETRIUM NOVASURE;  Hysteroscopy, Dilation and Curettage, unable to perform Endometrial Ablation with Novasure secondary to uterine perforation, diagnostic laparoscopy, Bilateral Laparoscopic Tubal Ligation ;  Surgeon: Danilo White MD;  Location:  OR    EYE SURGERY      REmoval of mass left eye    LAPAROSCOPIC ASSISTED HYSTERECTOMY VAGINAL N/A 3/13/2024    Procedure: LAPAROSCOPY-ASSISTED VAGINAL HYSTERECTOMY AND BILATERAL SALPINGECTOMY;  Surgeon: Shelly Diaz MD;  Location:  OR    LAPAROSCOPIC TUBAL LIGATION Bilateral 3/16/2018    Procedure: LAPAROSCOPIC TUBAL LIGATION;;  Surgeon: Danilo White MD;  Location:  OR    LAPAROSCOPY DIAGNOSTIC (GYN)      REVISE CIRCUMCISION FEMALE N/A 3/13/2024    Procedure: FEMALE GENITAL INFIBULATION TAKE DOWN;  Surgeon: Shelly Diaz MD;  Location:  OR    SURGICAL HISTORY OF -       2010 turbinate surgery      Social History     Tobacco Use    Smoking status: Never     Passive exposure: Never    Smokeless tobacco: Never   Substance Use Topics    Alcohol use: No     Alcohol/week: 0.0 standard drinks of alcohol      Problem (# of Occurrences)  "Relation (Name,Age of Onset)    Diabetes (2) Father, Paternal Grandmother    Hypertension (1) Father    Neurologic Disorder (1) Mother (57): brain anuerysm    Polycythemia (1) Daughter    Other Cancer (1) Father (70): Testicle Cancer    Other - See Comments (1) Daughter (21): rare blood disorders              Current Outpatient Medications   Medication Sig Dispense Refill    cetirizine (ZYRTEC) 10 MG tablet Take 10 mg by mouth 2 times daily      estradiol (ESTRACE) 1 MG tablet Take 1 tablet (1 mg) by mouth every morning 90 tablet 3    multivitamin w/minerals (MULTI-VITAMIN) tablet Take 1 tablet by mouth daily      oxymetazoline (AFRIN) 0.05 % nasal spray Spray 2 sprays into both nostrils 2 times daily 37 mL 0    prazosin (MINIPRESS) 1 MG capsule Take 1 capsule (1 mg) by mouth at bedtime 30 capsule 11    progesterone (PROMETRIUM) 100 MG capsule Take 1 capsule (100 mg) by mouth at bedtime 90 capsule 3    cyclobenzaprine (FLEXERIL) 10 MG tablet Take 1 tablet (10 mg) by mouth 3 times daily as needed for muscle spasms (Patient not taking: Reported on 6/7/2024) 60 tablet 1     No current facility-administered medications for this visit.     Allergies   Allergen Reactions    Bactrim [Sulfamethoxazole-Trimethoprim] Swelling     Tongue swelling    Cucumber Extract Shortness Of Breath and Other (See Comments)     Tongue swelling    Trazodone Hives    Effexor [Venlafaxine] Hives, Rash and Blisters    Amoxicillin Swelling     Tongue swelling    Doxycycline Hives and Itching    Iodine Hives    Latex Hives     Lip swelling    Sulfa Antibiotics Hives    Watermelon [Citrullus Vulgaris] Hives     itching    Zolpidem Unknown    Zolpidem Tartrate Swelling     Tongue swelling    Amoxicillin-Pot Clavulanate Rash     Tongue swelling    Eszopiclone Swelling and Rash     Tongue swelling    Iodinated Contrast Media Rash       ROS:  No urinary frequency or dysuria, bladder or kidney problems      OBJECTIVE:     /72   Ht 1.6 m (5' 3\")  "  Wt 74.4 kg (164 lb)   LMP 02/16/2024 (Exact Date)   BMI 29.05 kg/m    Body mass index is 29.05 kg/m .    Exam:  Constitutional:  Appearance: Well nourished, well developed alert, in no acute distress  Skin: General Inspection:  No rashes present, no lesions present, no areas of discoloration.  Neurologic:  Mental Status:  Oriented X3.  Normal strength and tone, sensory exam grossly normal, mentation intact and speech normal.    Psychiatric:  Mentation appears normal and affect normal/bright.         ASSESSMENT/PLAN:                                                        ICD-10-CM    1. Perimenopausal symptoms  N95.1 estradiol (ESTRACE) 1 MG tablet      2. PTSD (post-traumatic stress disorder)  F43.10 prazosin (MINIPRESS) 1 MG capsule        We discussed continuing the oral prometrium at night. She is already physically active and I recommend continuing that. She was asked to try to go to bed earlier. I recommend trying minipress. She does have a history of emotionally traumatic experiences and identifies that her anxiety makes her sleep worse. I recommend trying the minipress at bedtime. And if well tolerated can continue. If she still wakes up in the middle of the night I recommend splitting the prometrium to at bedtime and the minipress to when she wakes up so that she can get back to sleep. She has been on a variety of medications for anxiety and has had many side effects. She has completed the [employer] test and realizes that there doesn't seem to be an easy solution. Will focus on optimizing her hormones. I recommend adding DHEA 10 mg in the AM.  I recommend follow up within one month to assess improvement.    Shelly Diaz MD  [facility]

## 2024-06-14 ENCOUNTER — TELEPHONE (OUTPATIENT)
Dept: PEDIATRICS | Facility: CLINIC | Age: 49
End: 2024-06-14
Payer: COMMERCIAL

## 2024-07-01 ENCOUNTER — TRANSFERRED RECORDS (OUTPATIENT)
Dept: HEALTH INFORMATION MANAGEMENT | Facility: CLINIC | Age: 49
End: 2024-07-01
Payer: COMMERCIAL

## 2024-07-07 ENCOUNTER — HEALTH MAINTENANCE LETTER (OUTPATIENT)
Age: 49
End: 2024-07-07

## 2024-10-21 ENCOUNTER — TRANSFERRED RECORDS (OUTPATIENT)
Dept: HEALTH INFORMATION MANAGEMENT | Facility: CLINIC | Age: 49
End: 2024-10-21
Payer: COMMERCIAL

## 2025-04-02 ENCOUNTER — ANCILLARY ORDERS (OUTPATIENT)
Dept: PEDIATRICS | Facility: CLINIC | Age: 50
End: 2025-04-02

## 2025-04-02 ENCOUNTER — VIRTUAL VISIT (OUTPATIENT)
Dept: PEDIATRICS | Facility: CLINIC | Age: 50
End: 2025-04-02
Payer: COMMERCIAL

## 2025-04-02 DIAGNOSIS — Z12.31 VISIT FOR SCREENING MAMMOGRAM: Primary | ICD-10-CM

## 2025-04-02 DIAGNOSIS — L50.8 CHRONIC URTICARIA: Primary | ICD-10-CM

## 2025-04-02 PROCEDURE — 98005 SYNCH AUDIO-VIDEO EST LOW 20: CPT | Mod: GE

## 2025-04-02 RX ORDER — LEVOCETIRIZINE DIHYDROCHLORIDE 5 MG/1
5 TABLET, FILM COATED ORAL
COMMUNITY

## 2025-04-02 RX ORDER — HYDROCORTISONE 25 MG/G
CREAM TOPICAL 2 TIMES DAILY
Qty: 30 G | Refills: 0 | Status: SHIPPED | OUTPATIENT
Start: 2025-04-02

## 2025-04-02 RX ORDER — HYDROXYZINE HYDROCHLORIDE 25 MG/1
25 TABLET, FILM COATED ORAL EVERY 8 HOURS PRN
Qty: 60 TABLET | Refills: 0 | Status: SHIPPED | OUTPATIENT
Start: 2025-04-02

## 2025-04-02 NOTE — PATIENT INSTRUCTIONS
It was nice to meet you. For today:     - We will prescribe you a hydrocortisone cream that can help with tampering down the inflammation  - We ordered hydroxyzine for you as needed for itchiness  - a Derm referral was placed, please call them to schedule this for ongoing discussions about your urticaria

## 2025-04-02 NOTE — PROGRESS NOTES
"Felicia is a 49 year old who is being evaluated via a billable video visit.    How would you like to obtain your AVS? MyChart  If the video visit is dropped, the invitation should be resent by: Text to cell phone: [phone]  Will anyone else be joining your video visit? No  {If patient encounters technical issues they should call [phone] :[id number]}    Assessment & Plan     (L50.8) Chronic urticaria  (primary encounter diagnosis)  Comment: Ongoing chronic spontaneous urticaria for several years.  Has flares at times despite being on Xyzal daily for some time.  Was previously on cetirizine prior to this.  Has been seen by allergy in the past with no further recommendations.  Currently, has a urticarial rash on her face that she continues to itch.  Hydroxyzine has been helpful in the past for itching and as patient is already on Xyzal, will prescribe hydroxyzine as needed.  As her facial rash continues to be pruritic, we will trial hydrocortisone cream on the face to help with itching.  As patient does have presentation consistent with chronic urticaria, will refer to dermatology for potential initiation of other agents for maintenance management as she continues to have flares despite limiting her allergen exposures.  Plan: Adult Dermatology  Referral,         hydrOXYzine HCl (ATARAX) 25 MG tablet,         hydrocortisone 2.5 % cream    BMI  Estimated body mass index is 29.05 kg/m  as calculated from the following:    Height as of 6/7/24: 1.6 m (5' 3\").    Weight as of 6/7/24: 74.4 kg (164 lb).       Subjective   Felicia is a 49 year old, presenting for the following health issues:  Hives      4/2/2025     2:35 PM   Additional Questions   Roomed by mf   Accompanied by self     History of Present Illness       Reason for visit:  Hives and itching, and I run out of refills for medication I was taking   She is taking medications regularly.    She is looking for alternative to HYDROXYZINE because this " medication causes her migraines     - Hx of seasonal allergies with multiple sinus infections yearly  - Taken cetirizine BID, flonase daily in the past  - No medications have helped  - Seen on 2/10 for this and told to continue cetirizine with plans for potential allergy referral  - Seen virtually for chronic urticaria on 4/9/2024 thought to potentially be due to effexor; placed allergy referral at that time    - Hydroxyzine has helped in the past but does trigger migraines   - Itching everywhere in her body but worse in her face x2 days  - Taking xyzal without relief   - Benadryl without relief   - Has scratched to the point of potential infection, using Bacitracin at home with some improvement     - Facial hives feel blistering in nature around her left cheek, getting better with bacitracin      - Now with hives x2 days  - No new medications, soaps, detergents, clothing, changes in routine        Review of Systems  Constitutional, neuro, ENT, endocrine, pulmonary, cardiac, gastrointestinal, genitourinary, musculoskeletal, integument and psychiatric systems are negative, except as otherwise noted.      Objective    Vitals - Patient Reported  Weight (Patient Reported): 73.9 kg (163 lb) (per pt)        Physical Exam   GENERAL: alert and no distress  EYES: Eyes grossly normal to inspection.  No discharge or erythema, or obvious scleral/conjunctival abnormalities.  RESP: No audible wheeze, cough, or visible cyanosis.    SKIN: Facial erythematous, urticarial rash. No significant rash, abnormal pigmentation or lesions.  NEURO: Cranial nerves grossly intact.  Mentation and speech appropriate for age.  PSYCH: Appropriate affect, tone, and pace of words        Video-Visit Details    Type of service:  Video Visit   Originating Location (pt. Location): Home  {PROVIDER LOCATION On-site should be selected for visits conducted from your clinic location or adjoining [facility] hospital, academic office, or other nearby [facility] building.  Off-site should be selected for all other provider locations, including home:[id number]}  Distant Location (provider location):  On-site  Platform used for Video Visit: Zoom (Telehealth)  Signed Electronically by: Goyo Connors MD  {Email feedback regarding this note to [email or web address]   :[id number]}

## 2025-04-09 ENCOUNTER — HOSPITAL ENCOUNTER (OUTPATIENT)
Dept: MAMMOGRAPHY | Facility: CLINIC | Age: 50
Discharge: HOME | End: 2025-04-09
Attending: INTERNAL MEDICINE
Payer: COMMERCIAL

## 2025-04-09 DIAGNOSIS — Z12.31 VISIT FOR SCREENING MAMMOGRAM: ICD-10-CM

## 2025-04-09 PROCEDURE — 77063 BREAST TOMOSYNTHESIS BI: CPT

## 2025-04-29 NOTE — TELEPHONE ENCOUNTER
Patient calling to see if [employer] results are back.  Please advise.  Call her back at [phone].  Rebecca Marcelo RN     Yes - the patient is able to be screened

## 2025-05-07 ENCOUNTER — OFFICE VISIT (OUTPATIENT)
Dept: OBGYN | Facility: CLINIC | Age: 50
End: 2025-05-07
Payer: COMMERCIAL

## 2025-05-07 VITALS
DIASTOLIC BLOOD PRESSURE: 70 MMHG | WEIGHT: 174.7 LBS | BODY MASS INDEX: 30.95 KG/M2 | HEIGHT: 63 IN | SYSTOLIC BLOOD PRESSURE: 112 MMHG

## 2025-05-07 DIAGNOSIS — Z11.3 SCREEN FOR STD (SEXUALLY TRANSMITTED DISEASE): Primary | ICD-10-CM

## 2025-05-07 PROCEDURE — 87591 N.GONORRHOEAE DNA AMP PROB: CPT | Performed by: STUDENT IN AN ORGANIZED HEALTH CARE EDUCATION/TRAINING PROGRAM

## 2025-05-07 PROCEDURE — 87491 CHLMYD TRACH DNA AMP PROBE: CPT | Performed by: STUDENT IN AN ORGANIZED HEALTH CARE EDUCATION/TRAINING PROGRAM

## 2025-05-07 RX ORDER — ESCITALOPRAM OXALATE 5 MG/1
5 TABLET ORAL DAILY
Qty: 30 TABLET | Refills: 1 | OUTPATIENT
Start: 2025-05-07

## 2025-05-07 NOTE — PROGRESS NOTES
FLASH [facility]***[facility]  Gynecology Office Visit    CC: ***Menopausal symptoms/HRT    S:  Felicia Bermudez is a 49 year old  who presents for ***. She is here ***alone.    LMP: ***  Bleeding/abnormal discharge since last period: ***No    Hot flash severity***  1) Not present  2) Mild - Do not interfere with usual activities  3) Moderate - Interfere somewhat with usual activities***  4) Severe - So bothersome that usual activities cannot be performed    Genitourinary symptoms of menopause: ***Yes    - Not sleeping  - Constipation   - had colonscopy negatve. At that time did have constipatoin   - straiing a lot. Don't feel like gets it all out  - Brain fog  - Feel like has flu constantly   - Joint pain - knee    - Pill - palpationts  - Patch - hives    - Effexor - worked but made ovestimulated and caused hives on face that got infected  - Escitaoopram - constipation  - Paroxetine - made her feel very tight. IN   - Gabapentin - constipation    - Hydroxyzine for hives - gives migraine and anxiety    - 2 month follow-up; 1 month if rally bad. Want to see pffpt first  - Menopause manifesto    - colorectral referral for skin tag, ?hemorrhoid  - blood in stool   - no urianry issues    - Eaxm noraml. Infib ddown. Small whti dischare  Mild pain parker obturator  Rectum with tags      OB/GYN Hx:  - ***  - Pregnancy history:***  - Age at birth of first child: ***  - Age of menarche: ***  - LMP: ***  - Menses are ***regular, occur every ***, bleeding lasts *** days, bleeding is ***moderate. ***Denies missed periods. ***Denies bleeding between periods. Pain is ***.  - *** sexually active with *** partner***s  - Contraception: ***  - STI history:***  - Last pap: ***  - Abnormal pap?: ***No  - Fibroids: ***  - Polyp: ***  - Ovarian cyst: ***  - Endometriosis: ***  - Dysuria: No***  - Incontinence: No***  - Dyspareunia: No***  - Auto-lubriction adequate: ***Yes  - Other lubrication: ***  - Mammogram:  ***  - Menopausal symptoms: ***No  - Hormone replacement: ***No    HRT Risk Assessment  HRT Contraindications: No***  - Breast cancer - ***No  - High-risk endometrial cancer - ***No  - Coronary heart disease (strong family history of CAD - relative) - ***No  - MI - ***No  - VTE - ***No  - Stroke - ***No  ***- Transient ischemic attack - ***No  - Inherited high-risk of thromboembolic disease - ***No  - Active liver disease - ***No  - Prophyria cutanea tardis - ***No  - Unexplained vaginal bleeding - ***No  - 10 years past menopause or >59yo (relative) - ***No    Oral (HRT) Estrogen Contraindications: No***  - Hypertriglyceridemia - ***No  - Active gallbladder disease - ***No  - Thrombophilias such as factor V Leiden  - ***No  - Migraine headaches with auras - ***No    Personal Breast Cancer History:  - Ever had breast cancer: ***No  - Ever had benign breast biopsy: ***No  - How many biopsies: ***No  - Biopsy ever had atypical hyperplasia: ***No    Family History:  - Coronary artery disease: ***No  - Breast cancer: ***No   - How many first degree relatives with breast cancer: ***   - How many non-first degree relatives with breast cancer: ***    ***-    Breast Cancer Risk Calculator ***Not accurate for all women, including those with multiple 1st degree relatives with breast cancer  https://bcrisktool.cancer.gov/  5-yr Risk: ***%  (Average risk = ***%       ***https://www.[employer]/contents/treatment-of-menopausal-symptoms-with-hormone-therapy?search=menopause%20hrt&source=search_result&selectedTitle=1%7E150&usage_type=default&display_rank=1#K9037656806    CVD Risk Calculator    https://www.[employer]/contents/calculator-cardiovascular-risk-assessment-in-adults-10-year-acc-aha-2013-conventional-and-si-units?search=hot%20flashes&uvrqjAlb=1383&source=see_link   10-yr Risk: ***%  ***  Low (<5%): HRT ok  Moderate (5-10%): HRT ok (choose transdermal)  High (>10%): Avoid  HRT      ***https://www.[employer]/contents/treatment-of-menopausal-symptoms-with-hormone-therapy?search=menopause%20hrt&source=search_result&selectedTitle=1%7E150&usage_type=default&display_rank=1#I7848430234    Past Medical History:  ***  Past Medical History:   Diagnosis Date    Abnormal weight gain 2/28/2017    Adenitis 4/30/2019    Anxiety 6/9/2018    Arthritis     Dyspepsia 3/18/2019    High risk HPV infection 08/03/15    NIL, +HPV 18, plan colp    History of colposcopy 11/16/15    No bx taken    Hives     multiple allergies - food, environmental    Insomnia     Migraine without status migrainosus, not intractable 4/29/2019       Problem List:  ***  Patient Active Problem List   Diagnosis    Urticaria    Multiple food allergies    PUD (peptic ulcer disease)    High risk HPV infection    Psychophysiological insomnia    Menorrhagia    Anxiety    Vitamin D deficiency    Migraine without status migrainosus, not intractable    Chronic fatigue syndrome    Menopausal and postmenopausal disorder    Iron deficiency anemia    S/P hysterectomy       Past Surgical History:  ***  Past Surgical History:   Procedure Laterality Date    COLONOSCOPY N/A 5/1/2023    Procedure: Colonoscopy;  Surgeon: Page Neal MD;  Location:  GI    CYSTOSCOPY N/A 3/13/2024    Procedure: Cystoscopy;  Surgeon: Shelly Diaz MD;  Location:  OR    DILATION AND CURETTAGE, HYSTEROSCOPY, ABLATE ENDOMETRIUM NOVASURE, COMBINED N/A 3/16/2018    Procedure: COMBINED DILATION AND CURETTAGE, HYSTEROSCOPY, ABLATE ENDOMETRIUM NOVASURE;  Hysteroscopy, Dilation and Curettage, unable to perform Endometrial Ablation with Novasure secondary to uterine perforation, diagnostic laparoscopy, Bilateral Laparoscopic Tubal Ligation ;  Surgeon: Danilo White MD;  Location:  OR    EYE SURGERY      REmoval of mass left eye    LAPAROSCOPIC ASSISTED HYSTERECTOMY VAGINAL N/A 3/13/2024    Procedure: LAPAROSCOPY-ASSISTED VAGINAL HYSTERECTOMY  "AND BILATERAL SALPINGECTOMY;  Surgeon: Shelly Diaz MD;  Location: [facility] OR    LAPAROSCOPIC TUBAL LIGATION Bilateral 3/16/2018    Procedure: LAPAROSCOPIC TUBAL LIGATION;;  Surgeon: Danilo White MD;  Location: [facility] OR    LAPAROSCOPY DIAGNOSTIC (GYN)      REVISE CIRCUMCISION FEMALE N/A 3/13/2024    Procedure: FEMALE GENITAL INFIBULATION TAKE DOWN;  Surgeon: Shelly Diaz MD;  Location:  OR    SURGICAL HISTORY OF -       2010 turbinate surgery       Home Medications:  ***  Current Outpatient Medications   Medication Instructions    cetirizine (ZYRTEC) 10 mg, Oral, 2 TIMES DAILY    cyclobenzaprine (FLEXERIL) 10 mg, Oral, 3 TIMES DAILY PRN    estradiol (ESTRACE) 1 mg, Oral, EVERY MORNING    hydrocortisone 2.5 % cream Topical, 2 TIMES DAILY    hydrOXYzine HCl (ATARAX) 25 mg, Oral, EVERY 8 HOURS PRN    levocetirizine (XYZAL) 5 mg    multivitamin w/minerals (MULTI-VITAMIN) tablet 1 tablet, DAILY    oxymetazoline (AFRIN) 0.05 % nasal spray 2 sprays, Both Nostrils, 2 TIMES DAILY    prazosin (MINIPRESS) 1 mg, Oral, AT BEDTIME    progesterone (PROMETRIUM) 100 mg, Oral, AT BEDTIME       Family History:  ***  Family History   Problem Relation Age of Onset    Neurologic Disorder Mother 57        brain anuerysm    Hypertension Father     Other Cancer Father 70        Testicle Cancer    Diabetes Father     Diabetes Paternal Grandmother     Other - See Comments Daughter 21        rare blood disorders    Polycythemia Daughter        Social History:  ***  Social History     Tobacco Use    Smoking status: Never     Passive exposure: Never    Smokeless tobacco: Never   Vaping Use    Vaping status: Never Used   Substance Use Topics    Alcohol use: No     Alcohol/week: 0.0 standard drinks of alcohol    Drug use: No       ROS:  A 10-point review of systems was performed and is negative except as per HPI.    O:  /70 (BP Location: Left arm, Cuff Size: Adult Regular)   Ht 1.6 m (5' 3\")   Wt 79.2 kg " (174 lb 11.2 oz)   LMP 2024 (Exact Date)   BMI 30.95 kg/m      Exam:  GEN: Appears well, NAD  CV: Well perfused  PULM: NWOB  ABD: Soft, non-tender, non-distended  : Normal external genitalia***. Vagina normal***. Cervix normal***. Bimanual with ***no CMT, *** uterus, ***no adnexal masses. Normal discharge, no lesions, no bleeding***. Exam chaperoned by ***  SKIN: Appears normal without rashes or lesions.  EXT: Warm, well perfused, no edema    Labs:  ***    Imaging:  ***    A/P:  Felicia Bermudez is a 49 year old  for ***evaluation and treatment of hot flashes.    #Hot flashes  #Aliyah***Menopause  - Discussed that vasomotor symptoms of menopause typically cluster around 6 months to 2 years following final menses.   - Discussed expected reduction in vasomotor symptoms by 75% with systemic estrogen therapy.   - She does ***not have any estrogen contraindications.   - Discussed recommendation for concurrent progesterone administration to prevent endometrial hyperplasia and endometrial cancer. She has ***no uterus.   - We discussed lifestyle modification including dressing in layers, maintaining a healthy body weight, refraining from smoking, exercising regularly, and practicing relaxation techniques which may provided some relief.   - ***We discussed non-hormonal treatment options including SSRIs (paroxetine, escitalopram), SNRIs (venlafaxine, and desvenlafaxine), clonidine, and gabapentin which have demonstrated efficacy in reducing vasomotor symptoms, though to a lesser extent than HRT.   - ***We discussed the risks and benefits of hormone replacement therapy and the results of the original Women's Health Initiative study and subsequent data. Specifically:  - We discussed an increased risk of stroke, heart attack, and coronary artery disease, especially in patients with a personal history significant for chronic hypertension, hyperlipidemia, or strong family history of coronary artery disease. This is  most significant for patients who have been in menopause for a long time, and less an issue for women who have recently entered menopause.   - We discussed increased risks of DVT and PE in estrogen-users, and that this risk may be lowest (but possibly still increased) for transdermal estrogen users.   - We discussed a small but slightly increased risk of breast cancer in women who take both estrogen and progesterone, and why progesterone is needed in women who retain their uterus. The risk of breast cancer is minimally increased, if at all, in the first five years of estrogen and progesterone use, and may be lower for women who use micronized progesterone rather than progestins.   - We discussed current recommendations for using estrogen for symptomatic relief of menopausal symptoms in the lowest dose necessary for the shortest length of time needed.   - We also discussed other benefits including osteoporosis prevention and a decreased incidence of colon cancer, as well as quality of life issues.   - We discussed possible side effects including breast tenderness, headaches, bloating, mood symptoms, vaginal bleeding which is typically limited by using lower doses.  - She has stated her understanding and elects to begin treatment***  - Estrogen Patch: Lisa Edwards ***0.025 or 0.05 mg twice weekly ***(start lower)  - Oral Estrogen (Estrace): 0.5 to 1 mg once daily (AACE [Hernández 2011]; ACOG 141 2014). Dosage range: 0.5 to 2 mg/day (ES [Case 2015]).  - Micronized Progesterone (Prometrium) 200mg/day for 12 days (first 12 days of each month) or 100mg/day daily (take at bedtime for either)  - ***Mirena ok  - ***Combined Patch  - Gabapentin (max 900 mg at bedtime; start 300 mg at bedtime, may increase to 600 mg divided BID to TID)  - F/up in clinic in 3 months    Ulisses Gardiner MD MPH  [facility]  05/07/2025 8:11  AM    ====================================  ====================================    ***Alternative/[place] A/P  #Hot flashes  #Aliyah***Menopause  - Discussed that vasomotor symptoms of menopause typically cluster around 6 months to 2 years following final menses.   - Discussed expected reduction in vasomotor symptoms by 75% with systemic estrogen therapy.   - We discussed lifestyle modification including dressing in layers, maintaining a healthy body weight, refraining from smoking, exercising regularly, and practicing relaxation techniques which may provided some relief.   - ***We discussed non-hormonal treatment options including SSRIs (paroxetine, escitalopram), SNRIs (venlafaxine, and desvenlafaxine), clonidine, and gabapentin which have demonstrated efficacy in reducing vasomotor symptoms, though to a lesser extent than HRT  - She does ***not have any estrogen contraindications.   - Discussed preference for transdermal estrogen given decreased risk of VTE.   - Discussed recommendation for concurrent progesterone administration to prevent endometrial hyperplasia and endometrial cancer. She has ***no uterus.   - Discussed risks of HRT including slightly increased risk of thromboembolic disease, coronary heart disease, heart attack, stroke, HTN, and breast cancer after an average of 5 years of combined HRT.   - Discussed possible side effects including breast tenderness, headaches, bloating, vaginal bleeding which is typically limited by using lower doses.   - Discussed recommendation for treatment with the lowest effective dose for the shortest duration needed to relieve vasomotor symptoms.   - She has stated her understanding and elects to begin treatment***  - Estrogen Patch: Vivelle Dot ***0.025 or 0.05 mg twice weekly ***(start lower)  - Oral Estrogen (Estrace): 0.5 to 1 mg once daily (AACE [Hernández 2011]; ACOG 141 2014). Dosage range: 0.5 to 2 mg/day (ES [Case 2015]).  - Micronized Progesterone  (Prometrium) 200mg/day for 12 days (first 12 days of each month) or 100mg/day daily (take at bedtime for either)  - ***Mirena ok  - ***Combined Patch  - Gabapentin (max 900 mg at bedtime; start 300 mg at bedtime, may increase to 600 mg divided BID to TID)  - F/up in clinic in 3 months    ====================================  ====================================    ***Alternative/[place] A/P  #Hot flashes  #Aliyah***Menopause  - We discussed etiology and typical course of vasomotor symptoms of menopause; treatment options including lifestyle, non-hormonal, and hormonal; and the r/b/a to HRT. She does ***not have any contraindications to estrogen and does ***not require a progestin as she does not have a uterus.    ====================================  ====================================    #Hot flashes***HRT NOT RECOMMENDED***  - We discussed etiology and typical course of vasomotor symptoms of menopause; treatment options including lifestyle, non-hormonal, and hormonal; and the r/b/a to HRT. She does not have any absolute contraindications to estrogen, however her age at *** and other comorbidities of *** increase her risk of cardiac complications such as stroke, heart attack, and coronary artery disease, with her 10-year risk of an ASCVD event at ***. Because of this I would not recommend HRT, and she is in agreement.   - We discussed the option of expectant management vs using a non-hormonal option such as an selective serotonin reuptake inhibitor, SNRI, gabapentin, or pregbalin. ***She is currently taking Fluoxetine, which has been shown to not be more effective than placebo. We discussed the option of stopping this and starting a different medication such as Paroxetine or Citalopram, or adding on of those medications on.  - Ultimately she does not want to change her current medications. She would like to try to manage the hot flashes without medication. If she changes her mind I would be happy to discuss other  non-hormonal options. If she would like add/switch her ***selective serotonin reuptake inhibitor I would have her see her PCP  *** to manage this transition.     ====================================  ====================================    Non-Hormonal Options  The 2023 nonhormonal therapy position statement from the Menopause Society suggests a number of treatment options for hot flashes in women who cannot take hormone therapy. These include:  - Cognitive-behavioral therapy  - SSRIs: Paroxetine (not if taking Tamoxifen) and Citalopram. Sertraline and Fluoxetine are not more effective than placebo  - SNRIs: Venlafaxine and Desvenlafaxine  - Gabapentin  - Pregabalin (not NAMS)  - Oxybutynin  - Fezolinetant.    Nonhormonal therapies that NAMS does not recommend include:  - Paced respiration  - Supplements/herbal remedies  - Cooling techniques  - Avoiding triggers  - Exercise  - Yoga  - Mindfulness-based intervention  - Relaxation  - Soy  - Cannabinoids  - Acupuncture  - Clonidine    ====================================  ====================================    Concerns:  - PO estrogen has higher risk of VTE (?CAD) than transdermal  - Non-prometrium has higher risk of breast cancer

## 2025-05-08 LAB
C TRACH DNA SPEC QL NAA+PROBE: NEGATIVE
N GONORRHOEA DNA SPEC QL NAA+PROBE: NEGATIVE
SPECIMEN TYPE: NORMAL
SPECIMEN TYPE: NORMAL

## 2025-05-09 PROBLEM — R61 NIGHT SWEATS: Status: ACTIVE | Noted: 2025-05-09

## 2025-05-09 PROBLEM — K59.00 CONSTIPATION, UNSPECIFIED CONSTIPATION TYPE: Status: ACTIVE | Noted: 2025-05-09

## 2025-05-09 PROBLEM — F43.10 PTSD (POST-TRAUMATIC STRESS DISORDER): Status: ACTIVE | Noted: 2025-05-09

## 2025-05-09 PROBLEM — K64.4 ANAL SKIN TAG: Status: ACTIVE | Noted: 2025-05-09

## 2025-05-09 PROBLEM — G47.9 DIFFICULTY SLEEPING: Status: ACTIVE | Noted: 2022-09-01

## 2025-05-09 PROBLEM — M62.89 PELVIC FLOOR DYSFUNCTION IN FEMALE: Status: ACTIVE | Noted: 2025-05-09

## 2025-05-10 ENCOUNTER — RESULTS FOLLOW-UP (OUTPATIENT)
Dept: OBGYN | Facility: CLINIC | Age: 50
End: 2025-05-10
Payer: COMMERCIAL

## 2025-05-12 ENCOUNTER — PATIENT OUTREACH (OUTPATIENT)
Dept: CARE COORDINATION | Facility: CLINIC | Age: 50
End: 2025-05-12
Payer: COMMERCIAL

## 2025-05-14 ENCOUNTER — PATIENT OUTREACH (OUTPATIENT)
Dept: CARE COORDINATION | Facility: CLINIC | Age: 50
End: 2025-05-14
Payer: COMMERCIAL

## 2025-05-29 ENCOUNTER — PRE VISIT (OUTPATIENT)
Dept: SURGERY | Facility: CLINIC | Age: 50
End: 2025-05-29
Payer: COMMERCIAL

## 2025-05-29 NOTE — CONFIDENTIAL NOTE
COLON AND RECTAL SURGERY PRE-VISIT:  Diagnosis, Referred by & from: Skin tags.   Appt date: 6/18/2025 with COLLEEN Craft at [facility]   NOTES STATUS DETAILS   OFFICE NOTE from referring provider Internal      Records Requested       Record  Facility Outcome:   None   05/29/25 at 12:20 PM:  All relevant records are bookmarked under MARYSOL Mccartney.  No records to request at this time. PRE-VISIT COMPLETE.    Complete [x]       CELINE MccartneyT  Colon and Rectal Surgery

## 2025-06-16 ENCOUNTER — TELEPHONE (OUTPATIENT)
Dept: OBGYN | Facility: CLINIC | Age: 50
End: 2025-06-16
Payer: COMMERCIAL

## 2025-06-16 DIAGNOSIS — L50.8 CHRONIC URTICARIA: ICD-10-CM

## 2025-06-16 RX ORDER — HYDROXYZINE HYDROCHLORIDE 25 MG/1
25 TABLET, FILM COATED ORAL EVERY 8 HOURS PRN
Qty: 60 TABLET | Refills: 0 | Status: SHIPPED | OUTPATIENT
Start: 2025-06-16

## 2025-06-16 NOTE — TELEPHONE ENCOUNTER
[facility] Call Center    Phone Message    May a detailed message be left on voicemail: yes     Reason for Call: Medication Refill Request    Has the patient contacted the pharmacy for the refill? Yes   Name of medication being requested: escitalopram (LEXAPRO) 5 MG tablet, and pt said she needs hydrOXYzine HCl (ATARAX) 25 MG tablet as Lexapro causes hives   Provider who prescribed the medication:   Pharmacy: [id number] [address] AT [facility] [address] [facility] [address]  Date medication is needed: pt said she has about 13 days left, she has a follow up appt on 7/7/25, she will be out of meds before her appt and is wondering if she can get a refill       Action Taken: Message routed to:  Other: OBGYN    Travel Screening: Not Applicable     Date of Service:

## 2025-06-18 ENCOUNTER — OFFICE VISIT (OUTPATIENT)
Dept: SURGERY | Facility: CLINIC | Age: 50
End: 2025-06-18
Attending: STUDENT IN AN ORGANIZED HEALTH CARE EDUCATION/TRAINING PROGRAM
Payer: COMMERCIAL

## 2025-06-18 VITALS
RESPIRATION RATE: 16 BRPM | HEART RATE: 73 BPM | SYSTOLIC BLOOD PRESSURE: 115 MMHG | HEIGHT: 63 IN | BODY MASS INDEX: 30.85 KG/M2 | OXYGEN SATURATION: 98 % | DIASTOLIC BLOOD PRESSURE: 64 MMHG | WEIGHT: 174.1 LBS

## 2025-06-18 DIAGNOSIS — K64.8 INTERNAL HEMORRHOIDS: ICD-10-CM

## 2025-06-18 DIAGNOSIS — K64.4 RESIDUAL HEMORRHOIDAL SKIN TAGS: Primary | ICD-10-CM

## 2025-06-18 ASSESSMENT — PAIN SCALES - GENERAL: PAINLEVEL_OUTOF10: NO PAIN (0)

## 2025-06-18 NOTE — PROGRESS NOTES
[facility] Colon and Rectal Surgery Consultation Clinic Note    Patient:   Felicia Bermudez  YOB: 1975  Date of Visit:  6/18/2025  Referred by:  Damion Gardiner    Assessment & Plan    Patient is a 49-year-old female who presents to clinic for evaluation and management recommendations of an anal tag.  She noted onset of symptoms following constipation after recent surgery.  On exam today, she does appear to have a large hemorrhoidal tag that is understandably bothersome.  Unfortunately, given the size/shape, it would not be amenable to in clinic excision.  I discussed exam under anesthesia with hemorrhoidectomy/hemorrhoidal tag excision.  I reviewed the risks and benefits, as well as typical postoperative recovery time.  Patient would like to move forward with surgical intervention.  She does request a female surgeon perform the operation.  I will place a case request and she should hear from our team regarding scheduling this at her convenience.  Patient expressed understanding and agrees with this plan.    I spent a total of 30 minutes on the day of the visit.  Time spent on date of encounter doing chart review, history and exam, documentation, and further activities in this note. An additional 2 minutes was spent for anoscopy.    Nydia Craft PA-C  Colon and Rectal Surgery  [facility]    History of Present Illness     Felicia Bermudez is a very pleasant 49 year old female here with concerns for constipation and anal skin tags.    Felicia reports occasional hemorrhoid and anal skin tag symptoms since she does need care, however these previously were not very symptomatic.  She recently underwent hysterectomy and subsequently struggled significantly with constipation.  She was prescribed senna, however this seemed to make her constipation worse.  All of this exacerbated her symptoms.  She describes itching and discomfort/pain.  She describes the pain as a throbbing sensation.  " She will also note occasional bright red blood with wiping following bowel movements.  Her bowel movements are getting back to normal and, prior to her surgery, she had soft but formed bowel movements that are easy to pass without pushing or straining.    Last colonoscopy 5/1/2023  Impression:       - Perianal skin tags found on perianal exam.                             - The entire examined colon is normal.                             - No specimens collected.    Recommendation:         Repeat colonoscopy in 10 years for screening purposes.     No  was used for this encounter.    Physical Exam     Vital signs:  /64   Pulse 73   Resp 16   Ht 1.6 m (5' 3\")   Wt 79 kg (174 lb 1.6 oz)   LMP 02/16/2024 (Exact Date)   SpO2 98%   BMI 30.84 kg/m      General: alert, oriented, in no acute distress, sitting comfortably  Respiratory: non-labored breathing on RA  Chaperone: Betsy Cortez LPN    Perianal External Examination:  External exam reveals healthy perianal skin without erythema, excoriation or ulceration.  There is a large hemorrhoidal tag in the left lateral position.  This is not swollen not tender to direct palpation.  No anal fissure noted.  No other external masses or lesions, no bleeding.    Digital Rectal Examination: Was performed.   Good resting sphincter tone and palpable circumferential swelling consistent with internal hemorrhoids.  No other palpable masses or lesions, no tenderness to NAOMI.  No bleeding.    Anoscopy: Was performed.   Anoscopy revealed circumferential internal hemorrhoid but not erythematous and not excoriated.  No other visible masses or lesions.  No active bleeding or signs of recent bleeding.      Past Medical History:   Diagnosis Date    Abnormal weight gain 2/28/2017    Adenitis 4/30/2019    Anxiety 6/9/2018    Arthritis     Dyspepsia 3/18/2019    High risk HPV infection 08/03/15    NIL, +HPV 18, plan colp    History of colposcopy 11/16/15    No bx taken    " Hives     multiple allergies - food, environmental    Insomnia     Migraine without status migrainosus, not intractable 4/29/2019     Past Surgical History:   Procedure Laterality Date    COLONOSCOPY N/A 5/1/2023    Procedure: Colonoscopy;  Surgeon: Page Neal MD;  Location:  GI    CYSTOSCOPY N/A 3/13/2024    Procedure: Cystoscopy;  Surgeon: Shelly Diaz MD;  Location:  OR    DILATION AND CURETTAGE, HYSTEROSCOPY, ABLATE ENDOMETRIUM NOVASURE, COMBINED N/A 3/16/2018    Procedure: COMBINED DILATION AND CURETTAGE, HYSTEROSCOPY, ABLATE ENDOMETRIUM NOVASURE;  Hysteroscopy, Dilation and Curettage, unable to perform Endometrial Ablation with Novasure secondary to uterine perforation, diagnostic laparoscopy, Bilateral Laparoscopic Tubal Ligation ;  Surgeon: Danilo White MD;  Location: [facility] OR    EYE SURGERY      REmoval of mass left eye    LAPAROSCOPIC ASSISTED HYSTERECTOMY VAGINAL N/A 3/13/2024    Procedure: LAPAROSCOPY-ASSISTED VAGINAL HYSTERECTOMY AND BILATERAL SALPINGECTOMY;  Surgeon: Shelly Diaz MD;  Location:  OR    LAPAROSCOPIC TUBAL LIGATION Bilateral 3/16/2018    Procedure: LAPAROSCOPIC TUBAL LIGATION;;  Surgeon: Danilo White MD;  Location: [facility] OR    LAPAROSCOPY DIAGNOSTIC (GYN)      REVISE CIRCUMCISION FEMALE N/A 3/13/2024    Procedure: FEMALE GENITAL INFIBULATION TAKE DOWN;  Surgeon: Shelly Diaz MD;  Location:  OR    SURGICAL HISTORY OF -       2010 turbinate surgery     Current Outpatient Medications   Medication Sig Dispense Refill    cyclobenzaprine (FLEXERIL) 10 MG tablet Take 1 tablet (10 mg) by mouth 3 times daily as needed for muscle spasms 60 tablet 1    hydrocortisone 2.5 % cream Apply topically 2 times daily. 30 g 0    hydrOXYzine HCl (ATARAX) 25 MG tablet Take 1 tablet (25 mg) by mouth every 8 hours as needed for itching. 60 tablet 0    levocetirizine (XYZAL) 5 MG tablet Take 5 mg by mouth.      multivitamin w/minerals  (MULTI-VITAMIN) tablet Take 1 tablet by mouth daily      cetirizine (ZYRTEC) 10 MG tablet Take 10 mg by mouth 2 times daily (Patient not taking: Reported on 6/18/2025)      escitalopram (LEXAPRO) 5 MG tablet TAKE 1 TABLET(5 MG) BY MOUTH DAILY (Patient not taking: Reported on 6/18/2025) 30 tablet 0    estradiol (ESTRACE) 1 MG tablet Take 1 tablet (1 mg) by mouth every morning (Patient not taking: Reported on 6/18/2025) 90 tablet 3    prazosin (MINIPRESS) 1 MG capsule Take 1 capsule (1 mg) by mouth at bedtime (Patient not taking: Reported on 6/18/2025) 30 capsule 11    progesterone (PROMETRIUM) 100 MG capsule Take 1 capsule (100 mg) by mouth at bedtime (Patient not taking: Reported on 6/18/2025) 90 capsule 3     Allergies   Allergen Reactions    Bactrim [Sulfamethoxazole-Trimethoprim] Swelling     Tongue swelling    Cucumber Extract Shortness Of Breath and Other (See Comments)     Tongue swelling    Trazodone Hives    Effexor [Venlafaxine] Hives, Rash and Blisters    Amoxicillin Swelling     Tongue swelling    Doxycycline Hives and Itching    Iodine Hives    Latex Hives     Lip swelling    Sulfa Antibiotics Hives    Watermelon [Citrullus Vulgaris] Hives     itching    Zolpidem Unknown    Zolpidem Tartrate Swelling     Tongue swelling    Amoxicillin-Pot Clavulanate Rash     Tongue swelling    Eszopiclone Swelling and Rash     Tongue swelling    Iodinated Contrast Media Rash     Family History   Problem Relation Age of Onset    Neurologic Disorder Mother 57        brain anuerysm    Hypertension Father     Other Cancer Father 70        Testicle Cancer    Diabetes Father     Diabetes Paternal Grandmother     Other - See Comments Daughter 21        rare blood disorders    Polycythemia Daughter      Social History     Tobacco Use    Smoking status: Never     Passive exposure: Never    Smokeless tobacco: Never   Substance Use Topics    Alcohol use: No     Alcohol/week: 0.0 standard drinks of alcohol    Marital status:  No single.    This note was created using speech recognition software and may contain unintended word substitutions.

## 2025-06-18 NOTE — NURSING NOTE
"Chief Complaint   Patient presents with    New Patient     Anal skin tag       Vitals:    06/18/25 1419   BP: 115/64   Pulse: 73   Resp: 16   SpO2: 98%   Weight: 79 kg (174 lb 1.6 oz)   Height: 1.6 m (5' 3\")       Body mass index is 30.84 kg/m .     JOSE R Cortez LPN  "

## 2025-06-19 ENCOUNTER — TELEPHONE (OUTPATIENT)
Dept: SURGERY | Facility: CLINIC | Age: 50
End: 2025-06-19
Payer: COMMERCIAL

## 2025-06-19 NOTE — TELEPHONE ENCOUNTER
Per message received from this writer's supervisor, patient would like to be scheduled for her outpatient surgery with a female surgeon at [facility]. So, either Dr. Stanley or Dr. Irizarry.    Left voicemail for patient regarding scheduling surgery with either Dr. Stanley or Dr. Irizarry.    Provided contact number to discuss.    P: [phone]    __    Julianna lyle on 6/19/2025 at 8:52 AM

## 2025-07-07 ENCOUNTER — VIRTUAL VISIT (OUTPATIENT)
Dept: OBGYN | Facility: CLINIC | Age: 50
End: 2025-07-07
Payer: COMMERCIAL

## 2025-07-07 DIAGNOSIS — R61 NIGHT SWEATS: ICD-10-CM

## 2025-07-07 DIAGNOSIS — R23.2 HOT FLASHES: ICD-10-CM

## 2025-07-07 PROCEDURE — 98005 SYNCH AUDIO-VIDEO EST LOW 20: CPT | Performed by: STUDENT IN AN ORGANIZED HEALTH CARE EDUCATION/TRAINING PROGRAM

## 2025-07-07 RX ORDER — ESCITALOPRAM OXALATE 5 MG/1
5 TABLET ORAL DAILY
Qty: 90 TABLET | Refills: 3 | Status: SHIPPED | OUTPATIENT
Start: 2025-07-07

## 2025-07-07 ASSESSMENT — PATIENT HEALTH QUESTIONNAIRE - PHQ9: SUM OF ALL RESPONSES TO PHQ QUESTIONS 1-9: 1

## 2025-07-23 ENCOUNTER — PATIENT OUTREACH (OUTPATIENT)
Dept: CARE COORDINATION | Facility: CLINIC | Age: 50
End: 2025-07-23
Payer: COMMERCIAL

## 2025-08-06 ENCOUNTER — PATIENT OUTREACH (OUTPATIENT)
Dept: CARE COORDINATION | Facility: CLINIC | Age: 50
End: 2025-08-06
Payer: COMMERCIAL

## (undated) DEVICE — SU MONOCRYL 4-0 PS-2 27" UND Y426H

## (undated) DEVICE — SU VICRYL 4-0 PS-2 18" UND J496H

## (undated) DEVICE — ENDO SEAL SCOPE SELF SEAL 1.2MM 26153EAU

## (undated) DEVICE — PACKING NUGAUZE 1/4" PLAIN 7631

## (undated) DEVICE — LINEN HALF SHEET 5512

## (undated) DEVICE — PREP TECHNI-CARE CHLOROXYLENOL 3% 4OZ BOTTLE C222-4ZWO

## (undated) DEVICE — DECANTER BAG 2002S

## (undated) DEVICE — SYR 10ML SLIP TIP W/O NDL 303134

## (undated) DEVICE — NDL COUNTER 10CT

## (undated) DEVICE — GLOVE PROTEXIS MICRO 7.5  2D73PM75

## (undated) DEVICE — SOL NACL 0.9% IRRIG 1000ML BOTTLE 2F7124

## (undated) DEVICE — SUCTION CANISTER MEDIVAC LINER 3000ML W/LID 65651-530

## (undated) DEVICE — ENDO TROCAR OPTICAL 05MM VERSAPORT PLUS W/FIX CAN ONB5STF

## (undated) DEVICE — NDL 22GA 1.5"

## (undated) DEVICE — TUBING C02 INSUFFLATION LAP FILTER HEATER 6198

## (undated) DEVICE — ANTIFOG SOLUTION SEE SHARP 150M TROCAR SWABS 30978

## (undated) DEVICE — TUBING IRRIG TUR Y TYPE 96" LF 6543-01

## (undated) DEVICE — DECANTER VIAL 2006S

## (undated) DEVICE — SOL NACL 0.9% INJ 1000ML BAG 2B1324X

## (undated) DEVICE — ESU PENCIL W/HOLSTER E2350H

## (undated) DEVICE — GLOVE PROTEXIS BLUE W/NEU-THERA 7.5  2D73EB75

## (undated) DEVICE — ENDO SCOPE WARMER LF TM500

## (undated) DEVICE — GLOVE BIOGEL PI MICRO INDICATOR UNDERGLOVE SZ 7.5 48975

## (undated) DEVICE — SOL WATER IRRIG 1000ML BOTTLE 2F7114

## (undated) DEVICE — CATH INTERMITTENT CLEAN-CATH FEMALE 14FR 6" VINYL LF 420614

## (undated) DEVICE — SUCTION IRR STRYKERFLOW II W/TIP 250-070-520

## (undated) DEVICE — ESU HOLDER LAP INST DISP PURPLE LONG 330MM H-PRO-330

## (undated) DEVICE — SOL WATER IRRIG 3000ML BAG 2B7117

## (undated) DEVICE — TUBING SUCTION 12"X1/4" N612

## (undated) DEVICE — PEN MARKING W/RULER DYNJSM04

## (undated) DEVICE — DRAPE MAYO STAND 23X54 8337

## (undated) DEVICE — LIGHT HANDLE X2

## (undated) DEVICE — NDL INSUFFLATION 14GA STEP S100000

## (undated) DEVICE — SPECIMEN CULTURETTE DBL SWAB 220109

## (undated) DEVICE — ESU FCP OLYMPUS PK CUTTING 5MMX33CM PK-CF0533

## (undated) DEVICE — Device

## (undated) DEVICE — BLADE KNIFE SURG 10 371110

## (undated) DEVICE — GLOVE PROTEXIS POWDER FREE SMT 7.5  2D72PT75X

## (undated) DEVICE — BAG CLEAR TRASH 1.3M 39X33" P4040C

## (undated) DEVICE — ESU LIGASURE MARYLAND LAPAROSCOPIC SLR/DVDR 5MMX37CM LF1937

## (undated) DEVICE — ESU CATH ABLATION NOVASURE NS2000

## (undated) DEVICE — ENDO TROCAR FIRST ENTRY KII FIOS ADV FIX 05X100MM CFF03

## (undated) DEVICE — SU MONOCRYL 4-0 PS-2 18" UND Y496G

## (undated) DEVICE — SUCTION TIP YANKAUER W/O VENT K86

## (undated) DEVICE — BAG DRAIN URO FOR SIEMENS 8MM ADAPTER NS CC164NS-A

## (undated) DEVICE — SURGICEL POWDER ABSORBABLE HEMOSTAT 3GM 3013SP

## (undated) DEVICE — LINEN FULL SHEET 5511

## (undated) DEVICE — GOWN XLG DISP 9545

## (undated) DEVICE — EVAC SYSTEM CLEAR FLOW SC082500

## (undated) DEVICE — KIT PATIENT POSITIONING PIGAZZI LATEX FREE 40580

## (undated) DEVICE — PACKING IODOFORM STRIP 1/4" 7831

## (undated) DEVICE — PAD CHUX UNDERPAD 30X36" P3036C

## (undated) DEVICE — GLOVE BIOGEL PI ULTRATOUCH SZ 7.5 41175

## (undated) DEVICE — ENDO CANNULA 05MM VERSAONE UNIVERSAL UNVCA5STF

## (undated) DEVICE — SU VICRYL 0 CT-1 27" J340H

## (undated) DEVICE — SU VICRYL 2-0 CT-1 27" J339H

## (undated) DEVICE — SU VICRYL 0 CT-1 CR 8X27" UND JJ41G

## (undated) DEVICE — ESU ELEC BLADE 6" COATED E1450-6

## (undated) DEVICE — CATH TRAY FOLEY SURESTEP 16FR W/URINE MTR STATLK LF A303416A

## (undated) DEVICE — PACK MINOR LITHOTOMY RIDGES

## (undated) DEVICE — ENDO TROCAR SLEEVE KII ADV FIXATION 05X100MM CFS02

## (undated) DEVICE — LINEN TOWEL PACK X5 5464

## (undated) DEVICE — PACK GYN LAPAROSCOPY RIDGES

## (undated) DEVICE — SU DERMABOND MINI DHVM12

## (undated) DEVICE — SYR 10ML FINGER CONTROL W/O NDL 309695

## (undated) DEVICE — GLOVE PROTEXIS W/NEU-THERA 7.5  2D73TE75

## (undated) DEVICE — SOL NACL 0.9% INJ 250ML BAG 2B1322Q

## (undated) DEVICE — APPLICATOR ENDOSCOPIC 5 SURGICEL POWDER 3123SPEA

## (undated) DEVICE — SU PDS II 0 CT-2 27" Z334H

## (undated) DEVICE — ESU LIGASURE IMPACT OPEN SEALER/DVDR CVD LG JAW LF4418

## (undated) DEVICE — SU VICRYL 3-0 SH 27" J316H

## (undated) DEVICE — TUBING IRRIG CYSTO/BLADDER SET 81" LF 2C4040

## (undated) RX ORDER — EPHEDRINE SULFATE 50 MG/ML
INJECTION, SOLUTION INTRAVENOUS
Status: DISPENSED
Start: 2018-03-16

## (undated) RX ORDER — MEPERIDINE HYDROCHLORIDE 50 MG/ML
INJECTION INTRAMUSCULAR; INTRAVENOUS; SUBCUTANEOUS
Status: DISPENSED
Start: 2018-03-16

## (undated) RX ORDER — ONDANSETRON 2 MG/ML
INJECTION INTRAMUSCULAR; INTRAVENOUS
Status: DISPENSED
Start: 2024-03-13

## (undated) RX ORDER — KETOROLAC TROMETHAMINE 30 MG/ML
INJECTION, SOLUTION INTRAMUSCULAR; INTRAVENOUS
Status: DISPENSED
Start: 2018-03-16

## (undated) RX ORDER — HYDROMORPHONE HYDROCHLORIDE 1 MG/ML
INJECTION, SOLUTION INTRAMUSCULAR; INTRAVENOUS; SUBCUTANEOUS
Status: DISPENSED
Start: 2024-03-13

## (undated) RX ORDER — PROMETHAZINE HYDROCHLORIDE 25 MG/ML
INJECTION, SOLUTION INTRAMUSCULAR; INTRAVENOUS
Status: DISPENSED
Start: 2018-03-16

## (undated) RX ORDER — FENTANYL CITRATE 50 UG/ML
INJECTION, SOLUTION INTRAMUSCULAR; INTRAVENOUS
Status: DISPENSED
Start: 2024-03-13

## (undated) RX ORDER — FENTANYL CITRATE 50 UG/ML
INJECTION, SOLUTION INTRAMUSCULAR; INTRAVENOUS
Status: DISPENSED
Start: 2018-03-16

## (undated) RX ORDER — METRONIDAZOLE 500 MG/100ML
INJECTION, SOLUTION INTRAVENOUS
Status: DISPENSED
Start: 2024-03-13

## (undated) RX ORDER — FENTANYL CITRATE 50 UG/ML
INJECTION, SOLUTION INTRAMUSCULAR; INTRAVENOUS
Status: DISPENSED
Start: 2023-05-01

## (undated) RX ORDER — DEXAMETHASONE SODIUM PHOSPHATE 4 MG/ML
INJECTION, SOLUTION INTRA-ARTICULAR; INTRALESIONAL; INTRAMUSCULAR; INTRAVENOUS; SOFT TISSUE
Status: DISPENSED
Start: 2024-03-13

## (undated) RX ORDER — ONDANSETRON 2 MG/ML
INJECTION INTRAMUSCULAR; INTRAVENOUS
Status: DISPENSED
Start: 2018-03-16